# Patient Record
Sex: MALE | Race: WHITE | NOT HISPANIC OR LATINO | Employment: OTHER | URBAN - METROPOLITAN AREA
[De-identification: names, ages, dates, MRNs, and addresses within clinical notes are randomized per-mention and may not be internally consistent; named-entity substitution may affect disease eponyms.]

---

## 2020-11-04 ENCOUNTER — TELEPHONE (OUTPATIENT)
Dept: CARDIOLOGY | Facility: MEDICAL CENTER | Age: 80
End: 2020-11-04

## 2020-11-04 NOTE — TELEPHONE ENCOUNTER
Called patient to see if he has followed with a cardiologist in the past to get records prior to new patient appt with BE. He states he previously saw a cardiologist in [place] and plans to bring his entire file of medical records to his appointment to be scanned into his chart.     Confirmed appointment date and time.

## 2020-11-05 ENCOUNTER — OFFICE VISIT (OUTPATIENT)
Dept: CARDIOLOGY | Facility: MEDICAL CENTER | Age: 80
End: 2020-11-05
Payer: MEDICARE

## 2020-11-05 VITALS
OXYGEN SATURATION: 95 % | RESPIRATION RATE: 16 BRPM | SYSTOLIC BLOOD PRESSURE: 138 MMHG | DIASTOLIC BLOOD PRESSURE: 88 MMHG | HEIGHT: 76 IN | BODY MASS INDEX: 26.91 KG/M2 | WEIGHT: 221 LBS | HEART RATE: 71 BPM

## 2020-11-05 DIAGNOSIS — I77.89 ENLARGED THORACIC AORTA (HCC): ICD-10-CM

## 2020-11-05 DIAGNOSIS — I87.2 VENOUS INSUFFICIENCY OF BOTH LOWER EXTREMITIES: ICD-10-CM

## 2020-11-05 DIAGNOSIS — I10 ESSENTIAL HYPERTENSION, BENIGN: ICD-10-CM

## 2020-11-05 DIAGNOSIS — Z00.00 HEALTHCARE MAINTENANCE: ICD-10-CM

## 2020-11-05 DIAGNOSIS — I48.91 ATRIAL FIBRILLATION, UNSPECIFIED TYPE (HCC): ICD-10-CM

## 2020-11-05 LAB — EKG IMPRESSION: NORMAL

## 2020-11-05 PROCEDURE — 99204 OFFICE O/P NEW MOD 45 MIN: CPT | Performed by: INTERNAL MEDICINE

## 2020-11-05 PROCEDURE — 93000 ELECTROCARDIOGRAM COMPLETE: CPT | Performed by: INTERNAL MEDICINE

## 2020-11-05 RX ORDER — FUROSEMIDE 40 MG/1
40 TABLET ORAL DAILY
COMMUNITY
End: 2021-07-12

## 2020-11-05 RX ORDER — TRAMADOL HYDROCHLORIDE 50 MG/1
50 TABLET ORAL 2 TIMES DAILY
COMMUNITY

## 2020-11-05 RX ORDER — DIGOXIN 250 MCG
250 TABLET ORAL DAILY
COMMUNITY
End: 2021-01-01 | Stop reason: SDUPTHER

## 2020-11-05 RX ORDER — DILTIAZEM HYDROCHLORIDE 120 MG/1
120 TABLET, FILM COATED ORAL DAILY
COMMUNITY

## 2020-11-05 RX ORDER — APIXABAN 5 MG/1
5 TABLET, FILM COATED ORAL 2 TIMES DAILY
COMMUNITY
Start: 2020-10-21

## 2020-11-05 NOTE — PROGRESS NOTES
CARDIOLOGY NEW PATIENT CONSULTATION    PCP: No primary care provider on file.    1. Atrial fibrillation, longstanding persistent (HCC)  Rate is well controlled.  ECG with digoxin changes.  -Continue Eliquis  -Check digoxin level, basic metabolic panel    2. Venous insufficiency of both lower extremities  Aggravated by escalation of calcium channel blocker.  Prior DVT/PE reviewed leg compression.  Minor skin breakdown over the left shin, healing.  Expectant management    3. Enlarged thoracic aorta (HCC)  4.2 cm in 2016.  -Echocardiogram to be updated    4.  Obstructive sleep apnea  Intolerant of PAP therapy.  Expectant management    5.  History of VTE  On Eliquis  -Echocardiogram    Follow up with Sebastian Denise M.D. in 1 year    Chief Complaint   Patient presents with   • Atrial Fibrillation       History: Alfred Diaz is a 80 y.o. male presenting to establish cardiovascular care for a prior diagnosis of atrial fibrillation.  He is also been noted to have enlarged thoracic aorta as well as longstanding venous insufficiency, obstructive sleep apnea and PE/DVT.  He has been on a longstanding stable regimen of Cardizem and digoxin.  At one point in the past he tried to take a higher dose of Cardizem but this was not tolerated for unclear reasons, he believes leg swelling.  He has experienced longstanding lower extremity edema, right greater than left which always resolves by morning.  This is been present since the blood clot.  He takes Eliquis without side effects.  He experiences longstanding back pain and pain around the bilateral knee replacements.  He has limited to standing for 5 to 10 minutes at a time before needing to sit because of back discomfort.  He does not experience angina, palpitations or shortness of breath.        ROS:   All other systems reviewed and negative except as per the HPI    PE:  /88 (BP Location: Left arm, Patient Position: Sitting, BP Cuff Size: Adult)   Pulse 71   Resp 16    "Ht 1.93 m (6' 4\")   Wt 100.2 kg (221 lb)   SpO2 95%   BMI 26.90 kg/m²   Gen: Well appearing  HEENT: Symmetric face. Anicteric sclerae. Moist mucus membranes  NECK: No JVD. No lymphadenopathy  CARDIAC: Normal PMI, irregular, normal S1, S2. without murmur  VASCULATURE: Normal carotid amplitude without bruit.   RESP: Clear to auscultation bilaterally  ABD: Soft, non-tender, non-distended  EXT: 2+ lower extremity edema on the right, 1+ on the left.  No deformity or cyanosis.  Eschar present over the left shin  SKIN: Warm and dry  NEURO: No gross deficits  PSYCH: Appropriate affect, participates in conversation    History reviewed. No pertinent past medical history.  History reviewed. No pertinent surgical history.  Allergies   Allergen Reactions   • Vicodin [Apap-Fd&C Yellow #10 [place]-Hydrocodone] Rash     Rash     Outpatient Encounter Medications as of 11/5/2020   Medication Sig Dispense Refill   • ELIQUIS 5 MG Tab Take 5 mg by mouth 2 Times a Day.     • traMADol (ULTRAM) 50 MG Tab Take 50 mg by mouth every four hours as needed.     • DILTIAZem (CARDIZEM) 120 MG Tab Take 120 mg by mouth every day.     • digoxin (LANOXIN) 250 MCG Tab Take 250 mcg by mouth every day.     • furosemide (LASIX) 40 MG Tab Take 40 mg by mouth every day.     • Acetaminophen (TYLENOL 8 HOUR PO) Take  by mouth.       No facility-administered encounter medications on file as of 11/5/2020.      Social History     Socioeconomic History   • Marital status: Not on file     Spouse name: Not on file   • Number of children: Not on file   • Years of education: Not on file   • Highest education level: Not on file   Occupational History   • Not on file   Social Needs   • Financial resource strain: Not on file   • Food insecurity     Worry: Not on file     Inability: Not on file   • Transportation needs     Medical: Not on file     Non-medical: Not on file   Tobacco Use   • Smoking status: Former Smoker     Quit date: 11/5/1955     Years since " quittin.0   • Smokeless tobacco: Never Used   Substance and Sexual Activity   • Alcohol use: Not on file   • Drug use: Not on file   • Sexual activity: Not on file   Lifestyle   • Physical activity     Days per week: Not on file     Minutes per session: Not on file   • Stress: Not on file   Relationships   • Social connections     Talks on phone: Not on file     Gets together: Not on file     Attends Anglican service: Not on file     Active member of club or organization: Not on file     Attends meetings of clubs or organizations: Not on file     Relationship status: Not on file   • Intimate partner violence     Fear of current or ex partner: Not on file     Emotionally abused: Not on file     Physically abused: Not on file     Forced sexual activity: Not on file   Other Topics Concern   • Not on file   Social History Narrative   • Not on file       History reviewed. No pertinent family history.      Studies  No results found for: CHOLSTRLTOT, LDL, HDL, TRIGLYCERIDE    No results found for: SODIUM, POTASSIUM, CHLORIDE, CO2, GLUCOSE, BUN, CREATININE, BUNCREATRAT, GLOMRATE  No results found for: ALKPHOSPHAT, ASTSGOT, ALTSGPT, TBILIRUBIN     For this encounter I directly reviewed Echo images and medical records I agree with the interpretations in the electronic health record

## 2020-11-20 ENCOUNTER — HOSPITAL ENCOUNTER (OUTPATIENT)
Dept: LAB | Facility: MEDICAL CENTER | Age: 80
End: 2020-11-20
Attending: INTERNAL MEDICINE
Payer: MEDICARE

## 2020-11-20 DIAGNOSIS — I77.89 ENLARGED THORACIC AORTA (HCC): ICD-10-CM

## 2020-11-20 DIAGNOSIS — I10 ESSENTIAL HYPERTENSION, BENIGN: ICD-10-CM

## 2020-11-20 DIAGNOSIS — I87.2 VENOUS INSUFFICIENCY OF BOTH LOWER EXTREMITIES: ICD-10-CM

## 2020-11-20 DIAGNOSIS — I48.91 ATRIAL FIBRILLATION, UNSPECIFIED TYPE (HCC): ICD-10-CM

## 2020-11-20 DIAGNOSIS — Z00.00 HEALTHCARE MAINTENANCE: ICD-10-CM

## 2020-11-20 LAB
ANION GAP SERPL CALC-SCNC: 7 MMOL/L (ref 7–16)
BUN SERPL-MCNC: 21 MG/DL (ref 8–22)
CALCIUM SERPL-MCNC: 9.1 MG/DL (ref 8.5–10.5)
CHLORIDE SERPL-SCNC: 105 MMOL/L (ref 96–112)
CHOLEST SERPL-MCNC: 203 MG/DL (ref 100–199)
CO2 SERPL-SCNC: 30 MMOL/L (ref 20–33)
CREAT SERPL-MCNC: 0.92 MG/DL (ref 0.5–1.4)
DIGOXIN SERPL-MCNC: 0.9 NG/ML (ref 0.8–2)
ERYTHROCYTE [DISTWIDTH] IN BLOOD BY AUTOMATED COUNT: 52.8 FL (ref 35.9–50)
FASTING STATUS PATIENT QL REPORTED: NORMAL
GLUCOSE SERPL-MCNC: 78 MG/DL (ref 65–99)
HCT VFR BLD AUTO: 53.5 % (ref 42–52)
HDLC SERPL-MCNC: 58 MG/DL
HGB BLD-MCNC: 17.5 G/DL (ref 14–18)
LDLC SERPL CALC-MCNC: 133 MG/DL
MCH RBC QN AUTO: 33.2 PG (ref 27–33)
MCHC RBC AUTO-ENTMCNC: 32.7 G/DL (ref 33.7–35.3)
MCV RBC AUTO: 101.5 FL (ref 81.4–97.8)
NT-PROBNP SERPL IA-MCNC: 786 PG/ML (ref 0–125)
PLATELET # BLD AUTO: 170 K/UL (ref 164–446)
PMV BLD AUTO: 11.8 FL (ref 9–12.9)
POTASSIUM SERPL-SCNC: 4.3 MMOL/L (ref 3.6–5.5)
RBC # BLD AUTO: 5.27 M/UL (ref 4.7–6.1)
SODIUM SERPL-SCNC: 142 MMOL/L (ref 135–145)
TRIGL SERPL-MCNC: 59 MG/DL (ref 0–149)
WBC # BLD AUTO: 6 K/UL (ref 4.8–10.8)

## 2020-11-20 PROCEDURE — 83880 ASSAY OF NATRIURETIC PEPTIDE: CPT | Mod: GA

## 2020-11-20 PROCEDURE — 36415 COLL VENOUS BLD VENIPUNCTURE: CPT

## 2020-11-20 PROCEDURE — 80061 LIPID PANEL: CPT

## 2020-11-20 PROCEDURE — 85027 COMPLETE CBC AUTOMATED: CPT

## 2020-11-20 PROCEDURE — 80048 BASIC METABOLIC PNL TOTAL CA: CPT

## 2020-11-20 PROCEDURE — 80162 ASSAY OF DIGOXIN TOTAL: CPT

## 2020-11-23 ENCOUNTER — TELEPHONE (OUTPATIENT)
Dept: CARDIOLOGY | Facility: MEDICAL CENTER | Age: 80
End: 2020-11-23

## 2020-11-23 DIAGNOSIS — E78.00 HYPERCHOLESTEREMIA: ICD-10-CM

## 2020-11-23 NOTE — TELEPHONE ENCOUNTER
Sebastian Denise M.D.  Wendi Markham R.N.             Labs overall look reasonable with the exception of the a slight elevation in the cholesterol. I suggest healthy dietary practices to lower cholesterol- high fiber, low animal fat diet and/or cholesterol lowering medication with atorvastatin 10 mg daily. Repeat cholesterol panel in three months.        Called pt and discussed lab results. He saw result note on Mychart. He feels that he is taking too many pills currently and would like to avoid atorvastatin at this time. He will try making dietary changes and repeat lipid panel in 3 months. Lab slip mailed to pt. He is also going to lose his PCP after the 1st of the year and is wondering if we can refill his meds when he needs them. Informed that we could refill cardiac meds and to let us know when he needs refills.

## 2020-12-08 ENCOUNTER — HOSPITAL ENCOUNTER (OUTPATIENT)
Dept: CARDIOLOGY | Facility: MEDICAL CENTER | Age: 80
End: 2020-12-08
Attending: INTERNAL MEDICINE
Payer: MEDICARE

## 2020-12-08 DIAGNOSIS — I87.2 VENOUS INSUFFICIENCY OF BOTH LOWER EXTREMITIES: ICD-10-CM

## 2020-12-08 DIAGNOSIS — I48.91 ATRIAL FIBRILLATION, UNSPECIFIED TYPE (HCC): ICD-10-CM

## 2020-12-08 LAB
LV EJECT FRACT  99904: 55
LV EJECT FRACT MOD 2C 99903: 53.43
LV EJECT FRACT MOD 4C 99902: 73.07
LV EJECT FRACT MOD BP 99901: 66.16

## 2020-12-08 PROCEDURE — 93306 TTE W/DOPPLER COMPLETE: CPT

## 2020-12-08 PROCEDURE — 93306 TTE W/DOPPLER COMPLETE: CPT | Mod: 26 | Performed by: INTERNAL MEDICINE

## 2021-01-01 ENCOUNTER — OFFICE VISIT (OUTPATIENT)
Dept: MEDICAL GROUP | Facility: PHYSICIAN GROUP | Age: 81
End: 2021-01-01
Payer: MEDICARE

## 2021-01-01 ENCOUNTER — PATIENT MESSAGE (OUTPATIENT)
Dept: CARDIOLOGY | Facility: MEDICAL CENTER | Age: 81
End: 2021-01-01

## 2021-01-01 ENCOUNTER — TELEPHONE (OUTPATIENT)
Dept: CARDIOLOGY | Facility: MEDICAL CENTER | Age: 81
End: 2021-01-01

## 2021-01-01 ENCOUNTER — SLEEP CENTER VISIT (OUTPATIENT)
Dept: SLEEP MEDICINE | Facility: MEDICAL CENTER | Age: 81
End: 2021-01-01
Payer: MEDICARE

## 2021-01-01 ENCOUNTER — TELEPHONE (OUTPATIENT)
Dept: SCHEDULING | Facility: IMAGING CENTER | Age: 81
End: 2021-01-01

## 2021-01-01 ENCOUNTER — HOSPITAL ENCOUNTER (OUTPATIENT)
Dept: LAB | Facility: MEDICAL CENTER | Age: 81
End: 2021-08-20
Attending: FAMILY MEDICINE
Payer: MEDICARE

## 2021-01-01 ENCOUNTER — HOSPITAL ENCOUNTER (OUTPATIENT)
Dept: LAB | Facility: MEDICAL CENTER | Age: 81
End: 2021-08-04
Attending: NURSE PRACTITIONER
Payer: MEDICARE

## 2021-01-01 ENCOUNTER — OFFICE VISIT (OUTPATIENT)
Dept: CARDIOLOGY | Facility: MEDICAL CENTER | Age: 81
End: 2021-01-01
Payer: MEDICARE

## 2021-01-01 ENCOUNTER — HOSPITAL ENCOUNTER (OUTPATIENT)
Dept: RADIOLOGY | Facility: MEDICAL CENTER | Age: 81
End: 2021-11-01
Attending: FAMILY MEDICINE
Payer: MEDICARE

## 2021-01-01 ENCOUNTER — TELEPHONE (OUTPATIENT)
Dept: SLEEP MEDICINE | Facility: MEDICAL CENTER | Age: 81
End: 2021-01-01

## 2021-01-01 ENCOUNTER — NON-PROVIDER VISIT (OUTPATIENT)
Dept: CARDIOLOGY | Facility: MEDICAL CENTER | Age: 81
End: 2021-01-01
Payer: MEDICARE

## 2021-01-01 ENCOUNTER — OFFICE VISIT (OUTPATIENT)
Dept: SLEEP MEDICINE | Facility: MEDICAL CENTER | Age: 81
End: 2021-01-01
Payer: MEDICARE

## 2021-01-01 ENCOUNTER — SLEEP STUDY (OUTPATIENT)
Dept: SLEEP MEDICINE | Facility: MEDICAL CENTER | Age: 81
End: 2021-01-01
Attending: PREVENTIVE MEDICINE
Payer: MEDICARE

## 2021-01-01 ENCOUNTER — HOSPITAL ENCOUNTER (OUTPATIENT)
Dept: RADIOLOGY | Facility: MEDICAL CENTER | Age: 81
End: 2021-08-27
Attending: PREVENTIVE MEDICINE
Payer: MEDICARE

## 2021-01-01 ENCOUNTER — SLEEP STUDY (OUTPATIENT)
Dept: SLEEP MEDICINE | Facility: MEDICAL CENTER | Age: 81
End: 2021-01-01
Attending: NURSE PRACTITIONER
Payer: MEDICARE

## 2021-01-01 ENCOUNTER — HOSPITAL ENCOUNTER (OUTPATIENT)
Dept: LAB | Facility: MEDICAL CENTER | Age: 81
End: 2021-08-20
Attending: NURSE PRACTITIONER
Payer: MEDICARE

## 2021-01-01 ENCOUNTER — HOSPITAL ENCOUNTER (OUTPATIENT)
Dept: LAB | Facility: MEDICAL CENTER | Age: 81
End: 2021-08-20
Attending: INTERNAL MEDICINE
Payer: MEDICARE

## 2021-01-01 VITALS
RESPIRATION RATE: 18 BRPM | OXYGEN SATURATION: 98 % | DIASTOLIC BLOOD PRESSURE: 78 MMHG | HEIGHT: 76 IN | HEART RATE: 66 BPM | SYSTOLIC BLOOD PRESSURE: 112 MMHG | WEIGHT: 219.8 LBS | BODY MASS INDEX: 26.77 KG/M2 | TEMPERATURE: 98.6 F

## 2021-01-01 VITALS
HEART RATE: 80 BPM | SYSTOLIC BLOOD PRESSURE: 126 MMHG | HEIGHT: 76 IN | DIASTOLIC BLOOD PRESSURE: 80 MMHG | TEMPERATURE: 97.6 F | WEIGHT: 227.4 LBS | RESPIRATION RATE: 16 BRPM | BODY MASS INDEX: 27.69 KG/M2 | OXYGEN SATURATION: 94 %

## 2021-01-01 VITALS
SYSTOLIC BLOOD PRESSURE: 140 MMHG | BODY MASS INDEX: 26.67 KG/M2 | DIASTOLIC BLOOD PRESSURE: 100 MMHG | HEART RATE: 85 BPM | RESPIRATION RATE: 16 BRPM | WEIGHT: 219 LBS | OXYGEN SATURATION: 94 % | HEIGHT: 76 IN

## 2021-01-01 VITALS
HEIGHT: 76 IN | WEIGHT: 218 LBS | BODY MASS INDEX: 26.55 KG/M2 | OXYGEN SATURATION: 95 % | HEART RATE: 77 BPM | SYSTOLIC BLOOD PRESSURE: 114 MMHG | RESPIRATION RATE: 16 BRPM | DIASTOLIC BLOOD PRESSURE: 70 MMHG

## 2021-01-01 VITALS
DIASTOLIC BLOOD PRESSURE: 76 MMHG | HEART RATE: 80 BPM | RESPIRATION RATE: 16 BRPM | OXYGEN SATURATION: 94 % | BODY MASS INDEX: 27.34 KG/M2 | HEIGHT: 76 IN | WEIGHT: 224.5 LBS | SYSTOLIC BLOOD PRESSURE: 120 MMHG

## 2021-01-01 VITALS — SYSTOLIC BLOOD PRESSURE: 118 MMHG | HEART RATE: 72 BPM | DIASTOLIC BLOOD PRESSURE: 64 MMHG

## 2021-01-01 VITALS
DIASTOLIC BLOOD PRESSURE: 82 MMHG | SYSTOLIC BLOOD PRESSURE: 130 MMHG | OXYGEN SATURATION: 96 % | BODY MASS INDEX: 27.64 KG/M2 | WEIGHT: 227 LBS | RESPIRATION RATE: 14 BRPM | HEART RATE: 72 BPM | HEIGHT: 76 IN

## 2021-01-01 VITALS
RESPIRATION RATE: 16 BRPM | DIASTOLIC BLOOD PRESSURE: 74 MMHG | TEMPERATURE: 97.4 F | WEIGHT: 223.4 LBS | OXYGEN SATURATION: 95 % | BODY MASS INDEX: 27.2 KG/M2 | HEART RATE: 64 BPM | SYSTOLIC BLOOD PRESSURE: 112 MMHG | HEIGHT: 76 IN

## 2021-01-01 VITALS
HEIGHT: 76 IN | RESPIRATION RATE: 16 BRPM | BODY MASS INDEX: 27.38 KG/M2 | DIASTOLIC BLOOD PRESSURE: 78 MMHG | WEIGHT: 224.8 LBS | OXYGEN SATURATION: 96 % | SYSTOLIC BLOOD PRESSURE: 126 MMHG | TEMPERATURE: 97.6 F | HEART RATE: 78 BPM

## 2021-01-01 VITALS
SYSTOLIC BLOOD PRESSURE: 114 MMHG | DIASTOLIC BLOOD PRESSURE: 72 MMHG | RESPIRATION RATE: 12 BRPM | OXYGEN SATURATION: 92 % | HEART RATE: 87 BPM | WEIGHT: 223.2 LBS | BODY MASS INDEX: 27.18 KG/M2 | HEIGHT: 76 IN

## 2021-01-01 DIAGNOSIS — I27.21 PAH (PULMONARY ARTERY HYPERTENSION) (HCC): ICD-10-CM

## 2021-01-01 DIAGNOSIS — Z86.711 HX OF PULMONARY EMBOLUS: ICD-10-CM

## 2021-01-01 DIAGNOSIS — Z23 NEED FOR IMMUNIZATION AGAINST INFLUENZA: ICD-10-CM

## 2021-01-01 DIAGNOSIS — R71.8 ELEVATED MCV: ICD-10-CM

## 2021-01-01 DIAGNOSIS — G47.33 OSA (OBSTRUCTIVE SLEEP APNEA): ICD-10-CM

## 2021-01-01 DIAGNOSIS — K40.20 BILATERAL INGUINAL HERNIA WITHOUT OBSTRUCTION OR GANGRENE, RECURRENCE NOT SPECIFIED: ICD-10-CM

## 2021-01-01 DIAGNOSIS — I26.99 PULMONARY EMBOLISM, UNSPECIFIED CHRONICITY, UNSPECIFIED PULMONARY EMBOLISM TYPE, UNSPECIFIED WHETHER ACUTE COR PULMONALE PRESENT (HCC): ICD-10-CM

## 2021-01-01 DIAGNOSIS — I48.11 LONGSTANDING PERSISTENT ATRIAL FIBRILLATION (HCC): ICD-10-CM

## 2021-01-01 DIAGNOSIS — I27.20 PULMONARY HTN (HCC): ICD-10-CM

## 2021-01-01 DIAGNOSIS — Z79.01 CHRONIC ANTICOAGULATION: ICD-10-CM

## 2021-01-01 DIAGNOSIS — I26.09 OTHER PULMONARY EMBOLISM WITH ACUTE COR PULMONALE, UNSPECIFIED CHRONICITY (HCC): ICD-10-CM

## 2021-01-01 DIAGNOSIS — R19.00 MASS OF ABDOMEN OF SINGLE QUADRANT: ICD-10-CM

## 2021-01-01 DIAGNOSIS — G47.39 TREATMENT-EMERGENT CENTRAL SLEEP APNEA: ICD-10-CM

## 2021-01-01 DIAGNOSIS — I82.4Z1 DEEP VEIN THROMBOSIS (DVT) OF DISTAL VEIN OF RIGHT LOWER EXTREMITY, UNSPECIFIED CHRONICITY (HCC): ICD-10-CM

## 2021-01-01 DIAGNOSIS — G47.30 BREATHING-RELATED SLEEP DISORDER: ICD-10-CM

## 2021-01-01 DIAGNOSIS — G47.31 COMPLEX SLEEP APNEA SYNDROME: ICD-10-CM

## 2021-01-01 DIAGNOSIS — Z79.01 ANTICOAGULATED: ICD-10-CM

## 2021-01-01 DIAGNOSIS — I51.89 DIASTOLIC DYSFUNCTION: ICD-10-CM

## 2021-01-01 DIAGNOSIS — I48.91 ATRIAL FIBRILLATION, UNSPECIFIED TYPE (HCC): ICD-10-CM

## 2021-01-01 DIAGNOSIS — E78.00 HYPERCHOLESTEREMIA: ICD-10-CM

## 2021-01-01 DIAGNOSIS — I87.2 VENOUS INSUFFICIENCY OF BOTH LOWER EXTREMITIES: ICD-10-CM

## 2021-01-01 DIAGNOSIS — R53.83 OTHER FATIGUE: ICD-10-CM

## 2021-01-01 DIAGNOSIS — R06.02 SHORTNESS OF BREATH: ICD-10-CM

## 2021-01-01 DIAGNOSIS — K40.21 BILATERAL RECURRENT INGUINAL HERNIA WITHOUT OBSTRUCTION OR GANGRENE: ICD-10-CM

## 2021-01-01 LAB
ANION GAP SERPL CALC-SCNC: 11 MMOL/L (ref 7–16)
ANION GAP SERPL CALC-SCNC: 12 MMOL/L (ref 7–16)
BASOPHILS # BLD AUTO: 0.8 % (ref 0–1.8)
BASOPHILS # BLD: 0.07 K/UL (ref 0–0.12)
BUN SERPL-MCNC: 19 MG/DL (ref 8–22)
BUN SERPL-MCNC: 23 MG/DL (ref 8–22)
CALCIUM SERPL-MCNC: 9.1 MG/DL (ref 8.5–10.5)
CALCIUM SERPL-MCNC: 9.4 MG/DL (ref 8.5–10.5)
CHLORIDE SERPL-SCNC: 103 MMOL/L (ref 96–112)
CHLORIDE SERPL-SCNC: 104 MMOL/L (ref 96–112)
CHOLEST SERPL-MCNC: 206 MG/DL (ref 100–199)
CO2 SERPL-SCNC: 26 MMOL/L (ref 20–33)
CO2 SERPL-SCNC: 27 MMOL/L (ref 20–33)
CREAT SERPL-MCNC: 1.06 MG/DL (ref 0.5–1.4)
CREAT SERPL-MCNC: 1.11 MG/DL (ref 0.5–1.4)
EOSINOPHIL # BLD AUTO: 0.12 K/UL (ref 0–0.51)
EOSINOPHIL NFR BLD: 1.4 % (ref 0–6.9)
ERYTHROCYTE [DISTWIDTH] IN BLOOD BY AUTOMATED COUNT: 50.5 FL (ref 35.9–50)
FASTING STATUS PATIENT QL REPORTED: NORMAL
FASTING STATUS PATIENT QL REPORTED: NORMAL
FOLATE SERPL-MCNC: 8.1 NG/ML
GLUCOSE SERPL-MCNC: 89 MG/DL (ref 65–99)
GLUCOSE SERPL-MCNC: 98 MG/DL (ref 65–99)
HCT VFR BLD AUTO: 53.3 % (ref 42–52)
HDLC SERPL-MCNC: 47 MG/DL
HGB BLD-MCNC: 17.4 G/DL (ref 14–18)
IMM GRANULOCYTES # BLD AUTO: 0.02 K/UL (ref 0–0.11)
IMM GRANULOCYTES NFR BLD AUTO: 0.2 % (ref 0–0.9)
LDLC SERPL CALC-MCNC: 148 MG/DL
LYMPHOCYTES # BLD AUTO: 2.4 K/UL (ref 1–4.8)
LYMPHOCYTES NFR BLD: 29 % (ref 22–41)
MCH RBC QN AUTO: 32.5 PG (ref 27–33)
MCHC RBC AUTO-ENTMCNC: 32.6 G/DL (ref 33.7–35.3)
MCV RBC AUTO: 99.6 FL (ref 81.4–97.8)
MONOCYTES # BLD AUTO: 0.61 K/UL (ref 0–0.85)
MONOCYTES NFR BLD AUTO: 7.4 % (ref 0–13.4)
NEUTROPHILS # BLD AUTO: 5.07 K/UL (ref 1.82–7.42)
NEUTROPHILS NFR BLD: 61.2 % (ref 44–72)
NRBC # BLD AUTO: 0 K/UL
NRBC BLD-RTO: 0 /100 WBC
NT-PROBNP SERPL IA-MCNC: 1249 PG/ML (ref 0–125)
NT-PROBNP SERPL IA-MCNC: 831 PG/ML (ref 0–125)
PLATELET # BLD AUTO: 197 K/UL (ref 164–446)
PMV BLD AUTO: 11.4 FL (ref 9–12.9)
POTASSIUM SERPL-SCNC: 4.1 MMOL/L (ref 3.6–5.5)
POTASSIUM SERPL-SCNC: 4.5 MMOL/L (ref 3.6–5.5)
RBC # BLD AUTO: 5.35 M/UL (ref 4.7–6.1)
SODIUM SERPL-SCNC: 141 MMOL/L (ref 135–145)
SODIUM SERPL-SCNC: 142 MMOL/L (ref 135–145)
TRIGL SERPL-MCNC: 55 MG/DL (ref 0–149)
VIT B12 SERPL-MCNC: 504 PG/ML (ref 211–911)
WBC # BLD AUTO: 8.3 K/UL (ref 4.8–10.8)

## 2021-01-01 PROCEDURE — 85025 COMPLETE CBC W/AUTO DIFF WBC: CPT

## 2021-01-01 PROCEDURE — 95811 POLYSOM 6/>YRS CPAP 4/> PARM: CPT | Performed by: FAMILY MEDICINE

## 2021-01-01 PROCEDURE — 83880 ASSAY OF NATRIURETIC PEPTIDE: CPT | Mod: GA

## 2021-01-01 PROCEDURE — 99213 OFFICE O/P EST LOW 20 MIN: CPT | Performed by: FAMILY MEDICINE

## 2021-01-01 PROCEDURE — 99214 OFFICE O/P EST MOD 30 MIN: CPT | Performed by: NURSE PRACTITIONER

## 2021-01-01 PROCEDURE — 80048 BASIC METABOLIC PNL TOTAL CA: CPT

## 2021-01-01 PROCEDURE — 99999 PR NO CHARGE: CPT | Performed by: NURSE PRACTITIONER

## 2021-01-01 PROCEDURE — G0008 ADMIN INFLUENZA VIRUS VAC: HCPCS | Performed by: NURSE PRACTITIONER

## 2021-01-01 PROCEDURE — 36415 COLL VENOUS BLD VENIPUNCTURE: CPT

## 2021-01-01 PROCEDURE — 99204 OFFICE O/P NEW MOD 45 MIN: CPT | Performed by: FAMILY MEDICINE

## 2021-01-01 PROCEDURE — 71046 X-RAY EXAM CHEST 2 VIEWS: CPT

## 2021-01-01 PROCEDURE — 99213 OFFICE O/P EST LOW 20 MIN: CPT | Mod: 25 | Performed by: NURSE PRACTITIONER

## 2021-01-01 PROCEDURE — 82746 ASSAY OF FOLIC ACID SERUM: CPT

## 2021-01-01 PROCEDURE — 99214 OFFICE O/P EST MOD 30 MIN: CPT | Performed by: PREVENTIVE MEDICINE

## 2021-01-01 PROCEDURE — 95811 POLYSOM 6/>YRS CPAP 4/> PARM: CPT | Performed by: INTERNAL MEDICINE

## 2021-01-01 PROCEDURE — 74176 CT ABD & PELVIS W/O CONTRAST: CPT | Mod: MG

## 2021-01-01 PROCEDURE — 83880 ASSAY OF NATRIURETIC PEPTIDE: CPT

## 2021-01-01 PROCEDURE — 90662 IIV NO PRSV INCREASED AG IM: CPT | Performed by: NURSE PRACTITIONER

## 2021-01-01 PROCEDURE — 80061 LIPID PANEL: CPT

## 2021-01-01 PROCEDURE — 82607 VITAMIN B-12: CPT

## 2021-01-01 PROCEDURE — 99214 OFFICE O/P EST MOD 30 MIN: CPT | Performed by: FAMILY MEDICINE

## 2021-01-01 PROCEDURE — 99204 OFFICE O/P NEW MOD 45 MIN: CPT | Performed by: PREVENTIVE MEDICINE

## 2021-01-01 RX ORDER — POTASSIUM CHLORIDE 1500 MG/1
TABLET, EXTENDED RELEASE ORAL
Qty: 180 TABLET | Refills: 3 | Status: SHIPPED | OUTPATIENT
Start: 2021-01-01 | End: 2022-01-01

## 2021-01-01 RX ORDER — FUROSEMIDE 40 MG/1
40 TABLET ORAL 2 TIMES DAILY
Qty: 60 TABLET | Refills: 2 | Status: SHIPPED | OUTPATIENT
Start: 2021-01-01 | End: 2021-01-01

## 2021-01-01 RX ORDER — POTASSIUM CHLORIDE 1500 MG/1
TABLET, EXTENDED RELEASE ORAL
COMMUNITY
Start: 2021-01-01 | End: 2021-01-01

## 2021-01-01 RX ORDER — DIGOXIN 250 MCG
250 TABLET ORAL DAILY
Qty: 90 TABLET | Refills: 3 | Status: SHIPPED | OUTPATIENT
Start: 2021-01-01

## 2021-01-01 RX ORDER — POTASSIUM CHLORIDE 20 MEQ/1
20 TABLET, EXTENDED RELEASE ORAL 2 TIMES DAILY
Qty: 60 TABLET | Refills: 2 | Status: SHIPPED | OUTPATIENT
Start: 2021-01-01 | End: 2021-01-01

## 2021-01-01 RX ORDER — TRAMADOL HYDROCHLORIDE 50 MG/1
TABLET ORAL
COMMUNITY
Start: 2021-01-01 | End: 2021-01-01

## 2021-01-01 ASSESSMENT — FIBROSIS 4 INDEX
FIB4 SCORE: 2.04
FIB4 SCORE: 2.37
FIB4 SCORE: 2.04
FIB4 SCORE: 2.37
FIB4 SCORE: 2.04
FIB4 SCORE: 2.04

## 2021-01-01 ASSESSMENT — ENCOUNTER SYMPTOMS
BACK PAIN: 1
CLAUDICATION: 0
COUGH: 0
DIZZINESS: 0
ORTHOPNEA: 0
PALPITATIONS: 0
ORTHOPNEA: 0
ABDOMINAL PAIN: 0
CLAUDICATION: 0
PND: 0
FEVER: 0
BACK PAIN: 1
MYALGIAS: 0
COUGH: 0
MYALGIAS: 0
ABDOMINAL PAIN: 0
DIZZINESS: 0
PND: 0
SHORTNESS OF BREATH: 1
PALPITATIONS: 0
FEVER: 0
SHORTNESS OF BREATH: 1

## 2021-01-01 ASSESSMENT — PATIENT HEALTH QUESTIONNAIRE - PHQ9: CLINICAL INTERPRETATION OF PHQ2 SCORE: 0

## 2021-01-12 DIAGNOSIS — Z23 NEED FOR VACCINATION: ICD-10-CM

## 2021-07-07 ENCOUNTER — OFFICE VISIT (OUTPATIENT)
Dept: CARDIOLOGY | Facility: MEDICAL CENTER | Age: 81
End: 2021-07-07
Payer: MEDICARE

## 2021-07-07 VITALS
HEIGHT: 76 IN | DIASTOLIC BLOOD PRESSURE: 82 MMHG | WEIGHT: 231 LBS | BODY MASS INDEX: 28.13 KG/M2 | RESPIRATION RATE: 14 BRPM | OXYGEN SATURATION: 95 % | SYSTOLIC BLOOD PRESSURE: 140 MMHG | HEART RATE: 88 BPM

## 2021-07-07 DIAGNOSIS — I26.09 OTHER PULMONARY EMBOLISM WITH ACUTE COR PULMONALE, UNSPECIFIED CHRONICITY (HCC): ICD-10-CM

## 2021-07-07 DIAGNOSIS — I51.89 DIASTOLIC DYSFUNCTION: ICD-10-CM

## 2021-07-07 DIAGNOSIS — I87.2 VENOUS INSUFFICIENCY OF BOTH LOWER EXTREMITIES: ICD-10-CM

## 2021-07-07 DIAGNOSIS — I48.91 ATRIAL FIBRILLATION, UNSPECIFIED TYPE (HCC): ICD-10-CM

## 2021-07-07 DIAGNOSIS — I82.4Z1 DEEP VEIN THROMBOSIS (DVT) OF DISTAL VEIN OF RIGHT LOWER EXTREMITY, UNSPECIFIED CHRONICITY (HCC): ICD-10-CM

## 2021-07-07 DIAGNOSIS — Z79.01 CHRONIC ANTICOAGULATION: ICD-10-CM

## 2021-07-07 DIAGNOSIS — G47.33 OSA (OBSTRUCTIVE SLEEP APNEA): ICD-10-CM

## 2021-07-07 DIAGNOSIS — I27.21 PAH (PULMONARY ARTERY HYPERTENSION) (HCC): ICD-10-CM

## 2021-07-07 PROBLEM — I82.4Z9 DEEP VEIN THROMBOSIS (DVT) OF DISTAL VEIN OF LOWER EXTREMITY (HCC): Status: ACTIVE | Noted: 2021-07-07

## 2021-07-07 PROBLEM — I26.99 PULMONARY EMBOLUS (HCC): Status: ACTIVE | Noted: 2021-07-07

## 2021-07-07 LAB — EKG IMPRESSION: NORMAL

## 2021-07-07 PROCEDURE — 93000 ELECTROCARDIOGRAM COMPLETE: CPT | Performed by: INTERNAL MEDICINE

## 2021-07-07 PROCEDURE — 99214 OFFICE O/P EST MOD 30 MIN: CPT | Performed by: NURSE PRACTITIONER

## 2021-07-07 ASSESSMENT — ENCOUNTER SYMPTOMS
FEVER: 0
COUGH: 0
ORTHOPNEA: 0
PALPITATIONS: 0
SHORTNESS OF BREATH: 1
CLAUDICATION: 0
ABDOMINAL PAIN: 0
PND: 0
BACK PAIN: 1
DIZZINESS: 0
MYALGIAS: 0

## 2021-07-07 NOTE — PATIENT INSTRUCTIONS
I have referred you to Sleep medicine for consultation for sleep apnea treatment.    I have placed lab orders to be done this week or next to see if you are retaining fluid.    We will increase your furosemide back up to 40 mg in the morning.    We will see you back in 1 month for re-evaluation of your fatigue and shortness of breath.    Please schedule a follow up with a primary doctor. Call [phone] to schedule this appointment.

## 2021-07-09 ENCOUNTER — HOSPITAL ENCOUNTER (OUTPATIENT)
Dept: LAB | Facility: MEDICAL CENTER | Age: 81
End: 2021-07-09
Attending: NURSE PRACTITIONER
Payer: MEDICARE

## 2021-07-09 DIAGNOSIS — I51.89 DIASTOLIC DYSFUNCTION: ICD-10-CM

## 2021-07-09 DIAGNOSIS — I87.2 VENOUS INSUFFICIENCY OF BOTH LOWER EXTREMITIES: ICD-10-CM

## 2021-07-09 LAB
ALBUMIN SERPL BCP-MCNC: 3.6 G/DL (ref 3.2–4.9)
ALBUMIN/GLOB SERPL: 1.3 G/DL
ALP SERPL-CCNC: 99 U/L (ref 30–99)
ALT SERPL-CCNC: 15 U/L (ref 2–50)
ANION GAP SERPL CALC-SCNC: 12 MMOL/L (ref 7–16)
AST SERPL-CCNC: 19 U/L (ref 12–45)
BILIRUB SERPL-MCNC: 1.2 MG/DL (ref 0.1–1.5)
BUN SERPL-MCNC: 21 MG/DL (ref 8–22)
CALCIUM SERPL-MCNC: 9 MG/DL (ref 8.5–10.5)
CHLORIDE SERPL-SCNC: 105 MMOL/L (ref 96–112)
CO2 SERPL-SCNC: 26 MMOL/L (ref 20–33)
CREAT SERPL-MCNC: 0.96 MG/DL (ref 0.5–1.4)
GLOBULIN SER CALC-MCNC: 2.7 G/DL (ref 1.9–3.5)
GLUCOSE SERPL-MCNC: 94 MG/DL (ref 65–99)
NT-PROBNP SERPL IA-MCNC: 781 PG/ML (ref 0–125)
POTASSIUM SERPL-SCNC: 4.4 MMOL/L (ref 3.6–5.5)
PROT SERPL-MCNC: 6.3 G/DL (ref 6–8.2)
SODIUM SERPL-SCNC: 143 MMOL/L (ref 135–145)

## 2021-07-09 PROCEDURE — 36415 COLL VENOUS BLD VENIPUNCTURE: CPT

## 2021-07-09 PROCEDURE — 80053 COMPREHEN METABOLIC PANEL: CPT

## 2021-07-09 PROCEDURE — 83880 ASSAY OF NATRIURETIC PEPTIDE: CPT | Mod: GA

## 2021-07-12 ENCOUNTER — TELEPHONE (OUTPATIENT)
Dept: CARDIOLOGY | Facility: MEDICAL CENTER | Age: 81
End: 2021-07-12

## 2021-07-12 DIAGNOSIS — I51.89 DIASTOLIC DYSFUNCTION: ICD-10-CM

## 2021-07-12 DIAGNOSIS — R06.02 SHORTNESS OF BREATH: ICD-10-CM

## 2021-07-12 RX ORDER — FUROSEMIDE 40 MG/1
TABLET ORAL
Qty: 60 TABLET | Refills: 0 | Status: SHIPPED | OUTPATIENT
Start: 2021-07-12 | End: 2021-01-01

## 2021-07-12 RX ORDER — POTASSIUM CHLORIDE 20 MEQ/1
20 TABLET, EXTENDED RELEASE ORAL DAILY
Qty: 14 TABLET | Refills: 0 | Status: SHIPPED | OUTPATIENT
Start: 2021-07-12 | End: 2021-01-01

## 2021-07-12 NOTE — TELEPHONE ENCOUNTER
Notified patient of results and recommendations (he had seen Joshhart message from Nan earlier today). He states he is still experiencing SOB and fatigue, but symptoms haven't worsened. He denies weight gain. He will start taking/logging daily VS with daily weights. Discussed worsening signs of HF and to notify us with any concerns.    Updated prescription sent to [facility] pharmacy. Labs ordered and mailed to patient.

## 2021-07-12 NOTE — TELEPHONE ENCOUNTER
----- Message from LOLI Guthrie sent at 7/12/2021 12:44 PM PDT -----  Call patient. Increase furosemide to 40 mg BID and add potassium 20 mE QD. See how his symptoms are doing. Repeat bmp and bnp before my follow up in 1 month. Weight and vitals daily to follow his fatigue and shortness of breath. SC

## 2021-08-05 NOTE — TELEPHONE ENCOUNTER
Pt called and is in agreement with medication changes. Advised pt that Rx's have already been sent to [facility] pharmacy. Pt verbalized understanding.

## 2021-08-05 NOTE — TELEPHONE ENCOUNTER
----- Message -----   From: Sebastian Denise M.D.   Sent: 8/5/2021  11:32 AM PDT   To: Wendi Moreno R.N.     Labs were reviewed and I hear you are experiencing more shortness of breath.  Recommend changing Lasix to twice daily as well as potassium twice daily.  Repeat the basic metabolic panel and BNP in 2 weeks time.      ---------------------------------------------------------------------------------------------  Called pt to advise of medication changes [phone]. No answer, left VM to call office. Rx's sent to [facility]: furosemide 40mg BID, Potasium 20mEq BID. Defer lab orders to SC, pt has FV on 8/11 0930.

## 2021-08-05 NOTE — TELEPHONE ENCOUNTER
Called pt [phone]. Pt states he is feeling about the same as before the change in furosemide to 40 mg BID and add potassium 20 mE QD.  C/o SOB and fatigue, but not worsening symptoms.  Pt has kept a log of BP/HR/and daily weight per SC. Pt will send log via [employer] for review. Verified pt next appt with SC on 8/11 at 0930.

## 2021-08-06 NOTE — PATIENT COMMUNICATION
----- Message -----   From: Alfred Diaz   Sent: 8/6/2021  11:10 AM PDT   To: Cristina Nino/Brent   Subject: Procedure Question                               I have tried to send requested bp log.  Not seeing how to do it with [email or web address].  Please have Darline assist..     I guess that may have worked.    -------------------------------------------------------  Called pt [phone] no answer, left VM.  Confirmed receipt of BP log via [employer]. Advised pt that I will forward log to SC for review.

## 2021-08-11 NOTE — PROGRESS NOTES
Chief Complaint   Patient presents with   • Atrial Fibrillation   • Hypertension     F/V Dx: PAH (pulmonary artery hypertension) ([facility])     Subjective:   Alfred Diaz is a 81 y.o. male who presents today for follow up to review progressive fatigue and shortness of breath after increase in furosemide dosing.    He is a patient of Dr. Denise in our office.    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    He presents today alone. He admits to having progressive fatigue over the last 4-5 months. He has to stop to walk due to these symptoms. He also has chronic back pain and joint pain which limits his mobility. He is working with PT, swimming, and also has appointments with vestibular balance therapy.     He has no chest pain, palpitations, lightheadedness, or syncope.    He does have mechanical falls due to his knees and tripping over objects in the house. He has had no injuries with these falls.    He just got a PCP appointment to establish care alongside a pulmonary/sleep medicine consultation to review treating his DERREK.    He does not sleep well at night and wakes up often to urinate. He was not able to tolerate the CPAP machine, so he doesn't use this. He hasn't been evaluated in sleep medicine clinic >10 years.    He has chronic LE edema that is worse on the right side from prior DVT. He has no pain in his legs or redness. He wears compression stockings daily. He decreased his lasix dosing on his own due to urinating often.    He has a pain stimulator in his back for chronic pain.    He gets short of breath and fatigued with exertion and wishes to be more active.    Past Medical History:   Diagnosis Date   • A-fib ([facility])    • Arthritis    • Chronic anticoagulation    • DVT (deep venous thrombosis) ([facility])    • Falls    • DERREK (obstructive sleep apnea)    • PAH (pulmonary  artery hypertension) (HCC)    • PE (pulmonary thromboembolism) (HCC)    • Venous insufficiency      History reviewed. No pertinent surgical history.  History reviewed. No pertinent family history.  Social History     Socioeconomic History   • Marital status:      Spouse name: Not on file   • Number of children: Not on file   • Years of education: Not on file   • Highest education level: Not on file   Occupational History   • Not on file   Tobacco Use   • Smoking status: Former Smoker     Quit date: 1955     Years since quittin.8   • Smokeless tobacco: Never Used   Substance and Sexual Activity   • Alcohol use: Yes     Comment: Moderate   • Drug use: Never   • Sexual activity: Not on file   Other Topics Concern   • Not on file   Social History Narrative   • Not on file     Social Determinants of Health     Financial Resource Strain:    • Difficulty of Paying Living Expenses:    Food Insecurity:    • Worried About Running Out of Food in the Last Year:    • Ran Out of Food in the Last Year:    Transportation Needs:    • Lack of Transportation (Medical):    • Lack of Transportation (Non-Medical):    Physical Activity:    • Days of Exercise per Week:    • Minutes of Exercise per Session:    Stress:    • Feeling of Stress :    Social Connections:    • Frequency of Communication with Friends and Family:    • Frequency of Social Gatherings with Friends and Family:    • Attends Baptism Services:    • Active Member of Clubs or Organizations:    • Attends Club or Organization Meetings:    • Marital Status:    Intimate Partner Violence:    • Fear of Current or Ex-Partner:    • Emotionally Abused:    • Physically Abused:    • Sexually Abused:      Allergies   Allergen Reactions   • Vicodin [Apap-Fd&C Yellow #10 [place]-Hydrocodone] Rash     Rash     Outpatient Encounter Medications as of 2021   Medication Sig Dispense Refill   • furosemide (LASIX) 40 MG Tab Take 1 tablet by mouth 2 times a day. 60 tablet  "2   • potassium chloride SA (KDUR) 20 MEQ Tab CR Take 1 tablet by mouth 2 times a day. 60 tablet 2   • ELIQUIS 5 MG Tab Take 5 mg by mouth 2 Times a Day.     • traMADol (ULTRAM) 50 MG Tab Take 50 mg by mouth 2 times a day.     • DILTIAZem (CARDIZEM) 120 MG Tab Take 120 mg by mouth every day.     • digoxin (LANOXIN) 250 MCG Tab Take 250 mcg by mouth every day.     • Acetaminophen (TYLENOL 8 HOUR PO) Take  by mouth.     • [DISCONTINUED] potassium Chloride ER (K-TAB) 20 MEQ Tab CR tablet TAKE 1 TABLET BY MOUTH TWICE A DAY (Patient not taking: Reported on 8/11/2021)       No facility-administered encounter medications on file as of 8/11/2021.     Review of Systems   Constitutional: Positive for malaise/fatigue. Negative for fever.   Respiratory: Positive for shortness of breath. Negative for cough.    Cardiovascular: Positive for leg swelling. Negative for chest pain, palpitations, orthopnea, claudication and PND.   Gastrointestinal: Negative for abdominal pain.   Musculoskeletal: Positive for back pain and joint pain. Negative for myalgias.   Neurological: Negative for dizziness.        Objective:   /72 (BP Location: Left arm, Patient Position: Sitting, BP Cuff Size: Adult)   Pulse 87   Resp 12   Ht 1.93 m (6' 4\")   Wt 101 kg (223 lb 3.2 oz)   SpO2 92%   BMI 27.17 kg/m²     Physical Exam   Constitutional: He is oriented to person, place, and time. He appears well-developed.   HENT:   Head: Normocephalic and atraumatic.   Neck: No JVD present.   Cardiovascular: Normal rate, regular rhythm, normal heart sounds and normal pulses.   Pulmonary/Chest: Effort normal and breath sounds normal.   Abdominal: Normal appearance.   Musculoskeletal:         General: Normal range of motion.      Right lower leg: Edema present.      Left lower leg: Edema present.   Neurological: He is alert and oriented to person, place, and time.   Skin: Skin is warm and dry. Capillary refill takes less than 2 seconds.   Psychiatric: His " behavior is normal. Judgment and thought content normal.   Nursing note and vitals reviewed.      Assessment:     1. Atrial fibrillation, unspecified type (HCC)     2. Diastolic dysfunction  Basic Metabolic Panel    proBrain Natriuretic Peptide, NT   3. Venous insufficiency of both lower extremities     4. Chronic anticoagulation     5. Deep vein thrombosis (DVT) of distal vein of right lower extremity, unspecified chronicity (HCC)     6. Other pulmonary embolism with acute cor pulmonale, unspecified chronicity (HCC)     7. PAH (pulmonary artery hypertension) (HCC)     8. DERREK (obstructive sleep apnea)       Medical Decision Making:  Today's Assessment / Status / Plan:     1. Chronic afib with diastolic dysfunction  -rate controlled on EKG, asymptomatic  -cont dig, dilt, and eliquis  -no bleeding on eliquis  -EKG hard to interpret with stimulator causing artifact  -consider biotel in the future if symptoms progress    2. DERREK, untreated with moderate PAH  -discussed worsening fatigue could be related to untreated DERREK  -pending sleep medicine consultation this month    3. DVT with PE, venous insufficiency  -provoked DVT and PE post surgical  -cont Eliquis  -legs edematous with 2-3+ pitting edema, discussed low Na diet, adequate hydration and compression stockings  -BNP elevated in 1000 range, cont increase dose of furosemide at 40 mg BID with K 20 mEq BID  -repeat BNP, CMP for eval in 2 weeks  -echo unremarkable except PAH, recommend treating DERREK if able to tolerate CPAP machine    Patient is to follow up with Nan CONDE in 3 months with BP check in 1-2 weeks with repeat labs

## 2021-08-16 PROBLEM — R71.8 ELEVATED MCV: Status: ACTIVE | Noted: 2021-01-01

## 2021-08-16 NOTE — PROGRESS NOTES
Subjective:     CC:    Chief Complaint   Patient presents with   • Establish Care       HISTORY OF THE PRESENT ILLNESS: Patient is a 81 y.o. male. This pleasant patient is here today to establish care.  Patient states that he did have care in [facility] that he was seen at went completely all the business and they shut it down patient is on able to get immunization records.  Patient thinks he may have gotten a colonoscopy maybe 5 or 6 years ago that was normal.  Patient is seen cardiology here and scheduled to see him back in late August.  Patient also has appointment scheduled for sleep clinic here.  Patient is also seen a provider for pain management here.      No problem-specific Assessment & Plan notes found for this encounter.      Allergies: Vicodin [apap-fd&c yellow #10 [place]-hydrocodone]    Current Outpatient Medications Ordered in Epic   Medication Sig Dispense Refill   • furosemide (LASIX) 40 MG Tab Take 1 tablet by mouth 2 times a day. 60 tablet 2   • potassium chloride SA (KDUR) 20 MEQ Tab CR Take 1 tablet by mouth 2 times a day. 60 tablet 2   • ELIQUIS 5 MG Tab Take 5 mg by mouth 2 Times a Day.     • DILTIAZem (CARDIZEM) 120 MG Tab Take 120 mg by mouth every day.     • digoxin (LANOXIN) 250 MCG Tab Take 250 mcg by mouth every day.     • Acetaminophen (TYLENOL 8 HOUR PO) Take  by mouth.     • traMADol (ULTRAM) 50 MG Tab Take 50 mg by mouth 2 times a day.       No current [facility]-ordered facility-administered medications on file.       Past Medical History:   Diagnosis Date   • A-fib ([facility])    • Arthritis    • Chronic anticoagulation    • DVT (deep venous thrombosis) ([facility])    • Falls    • DERREK (obstructive sleep apnea)    • PAH (pulmonary artery hypertension) ([facility])    • PE (pulmonary thromboembolism) ([facility])    • Venous insufficiency        History reviewed. No pertinent surgical history.    Social History     Tobacco Use   • Smoking status: Former Smoker     Quit date: 11/5/1955     Years  "since quittin.8   • Smokeless tobacco: Never Used   Vaping Use   • Vaping Use: Never used   Substance Use Topics   • Alcohol use: Yes     Comment: Moderate   • Drug use: Never       Social History     Social History Narrative   • Not on file       Family History   Problem Relation Age of Onset   • Cancer Mother    • Lung Disease Father        Health Maintenance: Completed    ROS:   Gen: no fevers/chills  Eyes: no changes in vision  ENT: no sore throat, no hearing loss, no bloody nose  Pulm: no sob, no cough  CV: no chest pain, no palpitations  GI: no nausea/vomiting, no diarrhea  : no dysuria  MSk: no myalgias  Skin: no rash  Neuro: no headaches, no numbness/tingling  Heme/Lymph: no easy bruising      Objective:     Exam: /78   Pulse 66   Temp 37 °C (98.6 °F)   Resp 18   Ht 1.93 m (6' 4\")   Wt 99.7 kg (219 lb 12.8 oz)   SpO2 98%  Body mass index is 26.75 kg/m².    Gen: Alert and oriented, No apparent distress.  Patient is hard of hearing and I need to take my mask off in order for him to read my lips  Skin: Warm and dry.  No obvious lesions.  Eyes: Sclera wnl Pupils normal in size  Lungs: Normal effort, CTA bilaterally, no wheezes, rhonchi, or rales  CV: Regular rate and rhythm. No murmurs, rubs, or gallops.  ABD: Soft non-tender no organomegaly  Musculoskeletal: Normal gait. No extremity cyanosis, clubbing, or edema.  Neuro: Oriented to person, place and time  Psych: Mood is wnl       Labs: We will recommend getting a CBC and a B12 level patient admits to drinking alcohol.  Patient scheduled to get number labs done by cardiology will order written patient can let them know that I also ordered some blood work    Assessment & Plan:   81 y.o. male with the following -    1. Atrial fibrillation, unspecified type (HCC)  Patient is seeing cardiology for this this is a chronic problem.  I did review his previous labs from cardiology that he had in July and August.  2. Deep vein thrombosis (DVT) of " distal vein of right lower extremity, unspecified chronicity (HCC)  Patient is seeing cardiology for this this is a chronic problem  3. PAH (pulmonary artery hypertension) (HCC)  This is a chronic problem  4. DERREK (obstructive sleep apnea)  Patient will be seen sleep clinic this is a chronic problem  5. Chronic anticoagulation  This is a chronic problem  6. Elevated MCV  We will check another CBC and a B12 level this is a chronic problem  Return in about 4 weeks (around 9/13/2021).    Please note that this dictation was created using voice recognition software. I have made every reasonable attempt to correct obvious errors, but I expect that there are errors of grammar and possibly content that I did not discover before finalizing the note.

## 2021-08-23 NOTE — PATIENT COMMUNICATION
Sebastian Denise M.D.  You 9 minutes ago (9:26 AM)     Ok      See 8/20/21 result note from [facility]. Pt does not wish to start the recommended pravastatin.

## 2021-08-24 NOTE — TELEPHONE ENCOUNTER
Spoke with patient during BP check visit. His at home BP cuff is not reading accurately comparatively to our manual checks today. Patient reported to MA his machine is 7 years old. He told me he hasn't changed the batteries in a while and wants to see if that helps. I told him my ultimate recommendation is to purchase a new machine/cuff so we can accurately treat him and monitor his VS. He understands, but wishes to still try changing batteries first. He will report back to us after trying.     FYI to Nan CONDE

## 2021-08-27 NOTE — PROGRESS NOTES
"  CC: \"I had sleep apnea in the past and tried CPAP for at least 3 years but I did not think it was worth the effort.  That was at least 10 years ago.\"    HPI:  Alfred Diaz is a 81 y.o. male kindly referred by Hallie Siddiqi M.D..  This patient has a hx of  atrial fibrillation that is chronic and persistent he is on chronic anticoagulation therapy.  He is also had deep vein thromboses as well as pulmonary embolus.  He has pulmonary hypertension and diastolic dysfunction.  He has venous insufficiency of bilateral lower extremities for which he uses compression stockings.  He has been referred here by his cardiologist as well as his general practitioner to reevaluate his sleep apnea and if necessary treated.      Sleep history:  Patient sleeps from 11 PM to 9 AM.  It takes him 15 minutes to fall asleep.  He wakes up 3-4 times at night.  He is never refreshed in the morning.  He takes 1 nap per day for 30 minutes and he does feel that is refreshing.  This patient is a former smoker he smoked less than 1 pack/day for 40 years.  He has 1 drink per day and at times will drink a caffeinated beverage.  He is retired.  He reports that he does hold his breath when he sleeps and that he snores loudly.  He at times has trouble breathing through his nose and that is why he generally tries not to sleep on his back.  He has never tried any sleep aids in the past.  Shoup score is 9 out of 24 which is not consistent with excessive daytime sleepiness              Symptom Summary:  Snoring: +  Very loud snoring: +  Witnessed apneas: +  Resuscitative snorts: +  Nocturnal shortness of breath: +  Non-restorative sleep: +  EPWORTH SCORE:    9/24, this is not consistent with excessive daytime sleepiness  Insomnia: -  Nocturnal awakenings: +  Nocturia: +  Fatigue: +  Tiredness: +  Falls asleep accidentally: Rarely  Napping or returning to bed after arising: Daily  Restless legs: -  Limb movements during sleep: -  Nocturnal " headaches: -  Morning Headaches: -      This patient denies any symptoms consistent with parasomnias, restless legs, and or narcolepsy.    Significant comorbidities and modifying factors include: see HPI      Patient Active Problem List    Diagnosis Date Noted   • Elevated MCV 2021   • Atrial fibrillation (HCC) 2021   • Diastolic dysfunction 2021   • Venous insufficiency of both lower extremities 2021   • Chronic anticoagulation 2021   • Deep vein thrombosis (DVT) of distal vein of lower extremity ([facility]) 2021   • Pulmonary embolus (HCC) 2021   • PAH (pulmonary artery hypertension) ([facility]) 2021   • DERREK (obstructive sleep apnea) 2021       Past Medical History:   Diagnosis Date   • A-fib ([facility])    • Arthritis    • Chronic anticoagulation    • DVT (deep venous thrombosis) ([facility])    • Falls    • DERREK (obstructive sleep apnea)    • PAH (pulmonary artery hypertension) ([facility])    • PE (pulmonary thromboembolism) ([facility])    • Venous insufficiency         Past Surgical History:   Procedure Laterality Date   • ARTHROPLASTY Bilateral     knees   • INSERTION PERMANENT SPINAL CORD STIMULATOR     • LAMINOTOMY      lumbar   • TONSILLECTOMY     • TURP-VAPOR         Family History   Problem Relation Age of Onset   • Cancer Mother    • Lung Disease Father        Social History     Socioeconomic History   • Marital status:      Spouse name: Not on file   • Number of children: Not on file   • Years of education: Not on file   • Highest education level: Not on file   Occupational History   • Not on file   Tobacco Use   • Smoking status: Former Smoker     Years: 3.00     Types: Cigars     Quit date: 1955     Years since quittin.8   • Smokeless tobacco: Never Used   Vaping Use   • Vaping Use: Never used   Substance and Sexual Activity   • Alcohol use: Yes     Comment: Moderate   • Drug use: Never   • Sexual activity: Not on file   Other Topics Concern   • Not on file   Social  "History Narrative   • Not on file     Social Determinants of Health     Financial Resource Strain:    • Difficulty of Paying Living Expenses:    Food Insecurity:    • Worried About Running Out of Food in the Last Year:    • Ran Out of Food in the Last Year:    Transportation Needs:    • Lack of Transportation (Medical):    • Lack of Transportation (Non-Medical):    Physical Activity:    • Days of Exercise per Week:    • Minutes of Exercise per Session:    Stress:    • Feeling of Stress :    Social Connections:    • Frequency of Communication with Friends and Family:    • Frequency of Social Gatherings with Friends and Family:    • Attends [employer] Services:    • Active Member of Clubs or Organizations:    • Attends Club or Organization Meetings:    • Marital Status:    Intimate Partner Violence:    • Fear of Current or Ex-Partner:    • Emotionally Abused:    • Physically Abused:    • Sexually Abused:            ALLERGIES: Vicodin [apap-fd&c yellow #10 [place]-hydrocodone]    ROS    Constitutional: Denies weight loss, endorses fatigue.  Ears/Nose/Mouth/Throat: Edentulous patient has only upper dentures at this time  Cardiovascular: Denies chest pain, tightness, palpitations, endorses swelling in legs/feet, difficulty breathing when lying down but gets better when sitting up.   Respiratory: Endorses shortness of breath while awake with exertion  Sleep: per HPI  Gastrointestinal: Denies  difficulty swallowing,  heartburn.  Genitourinary: REPORTS nocturia  Musculoskeletal: Endorses, back pain and knee pain.   Neurological: Denies frequent headaches,weakness, dizziness.    PHYSICAL EXAM    Neck circumference: 17.5 inches  /70 (BP Location: Left arm, Patient Position: Sitting, BP Cuff Size: Adult)   Pulse 77   Resp 16   Ht 1.93 m (6' 4\")   Wt 98.9 kg (218 lb)   SpO2 95%   BMI 26.54 kg/m²   Appearance: Well-nourished, well-developed,  looks stated age, no acute distress  Eyes:   EOMI  ENMT:Edentulous: patient " has only upper dentures at this time  Neck: Supple, trachea midline  Respiratory effort:  No intercostal retractions or use of accessory muscles  Lung auscultation: Normal breath sounds in all quadrants of the lung except for the left base.  No breath sounds heard  Cardiac: No murmurs, rubs, or gallops; regular rhythm, normal rate; no edema  Musculoskeletal: Antalgic gait, patient has a spine stimulator implanted posterior left hip approximately at level L5.  Neurologic:  oriented to person, time, place, and purpose; judgement intact  Psychiatric:  No depression, anxiety, agitation        Medical Decision Making:  The medical record was reviewed in its as pertains to this referral. This includes records from primary care, consultants notes,  referral request, hospital records, labs, imaging.    Any available diagnostic and titration nocturnal polysomnograms, home sleep apnea tests, continuous nocturnal oximetry results, multiple sleep latency tests, and compliance reports were reviewed with the patient.    Assessment:    1. Longstanding persistent atrial fibrillation (HCC)  - Polysomnography, 4 or More; Future    2. Anticoagulated  - Polysomnography, 4 or More; Future    3. Pulmonary HTN (HCC)  - Polysomnography, 4 or More; Future    4. Hx of pulmonary embolus  - Polysomnography, 4 or More; Future    5. Diastolic dysfunction  - Polysomnography, 4 or More; Future    6. Breathing-related sleep disorder    This patient has had a recent echo and has left side cardiac function that is within normal limits.  His left ventricular ejection fraction is preserved.  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    PLAN:       The patient has signs and symptoms consistent with obstructive sleep apnea hypopnea syndrome. Will schedule a nocturnal polysomnogram..  This patient has multiple comorbid conditions including atrial fib pulmonary hypertension and history of pulmonary embolus.  Patient will be tested  in lab with a split-night study,  split patient early at AHI greater than 5/h. This patient refuses any sleep medication for the study.    On physical exam there  are no breath sounds heard on the left lung base.  Patient is short of breath only on exertion.  However given the patient's history of pulmonary emboli, a chest x-ray (2 view)  has been ordered.    The risks of untreated sleep apnea were discussed with the patient at length. Patients with DERREK are at increased risk of cardiovascular disease including coronary artery disease, systemic arterial hypertension, pulmonary arterial hypertension, cardiac arrhythmias, and stroke. DERREK patients have an increased risk of motor vehicle accidents, type 2 diabetes, chronic kidney disease, and non-alcoholic liver disease. The patient was advised to avoid driving a motor vehicle when drowsy.        Have advised the patient to follow up with the appropriate healthcare practitioners for all other medical problems and issues.                RTC:  1-2 weeks   After  PSG.  With

## 2021-09-10 NOTE — PROCEDURES
Clinical Comments:   The patient underwent a split night polysomnogram with a CPAP titration using the standard montage for measurement of paramaters of sleep, respiratory events, movement abnormalities, heart rate and rhythm. A Microphone was used to monitior snoring.  Interpretation:  Study start time was 10:18:59 PM. Total recording time was 3h 44.5m (224 minutes) with a total sleep time of 2h 18.0m (138 minutes) resulting in a sleep efficiency of 61.47%.  Sleep latency from the start fo the study was 21 minutes minutes and REM latency from sleep onset was 00 minutes minutes.  Respiratory:   There were 28 apneas in total consisting of 6 obstructive apneas, 0 mixed apneas, and 22 central apneas. There were 114 hypopneas in total.  The apnea index was 12.17 per hour and the hypopnea index was 49.57 per hour.  The overall AHI was 61.7, with a REM AHI of 0.00, and a supine AHI of 69.12.  Limb Movements:  There were a total of 112 periodic leg movements, of which 10 were PLMS arousals. This resulted in a PLMS index of 48.7 and a PLMS arousal index of 4.3  Oximetry:  The mean SaO2 was 89.0% for the diagnostic portion of the study, with a minimum SaO2 of 79.0%.   Treatment:  Interpretation:  Treatment recording time was 4h 21.0m (261 minutes) with a total sleep time of 2h 53.5m (173 minutes) resulting in a sleep efficiency of 66.5%.   Sleep latency from the start of treatment was 17 minutes minutes and REM latency from sleep onset was 1h 28.0m minutes.   The patient had 84 arousals in total for an arousal index of 29.0.  Respiratory:   There were 46 apneas in total consisting of 0 obstructive apneas, 46 central apneas, and 0 mixed apneas for an apnea index of 15.91.   The patient had 44 hypopneas in total, which resulted in a hypopnea index of 15.22.   The overall AHI was 31.12, with a REM AHI of 12.00, and a supine AHI of 38.02.   Limb Movements:  There were a total of 45 periodic leg movements, of which 4 were PLMS  arousals. This resulted in a PLMS index of 15.6 and a PLMS arousal index of 1.4.  Oximetry:  The mean SaO2 during treatment was 90.0%, with a minimum oxygen saturation of 80.0%.  CPAP was tried from 6 to 10cm H2O.     RECORDING TECHNIQUE:       After the scalp was prepared, gold plated electrodes were applied to the scalp according to the International 10-20 System. EEG (electroencephalogram) was continuously monitored from the O1-M2, O2-M1, C3-M2, C4-M1, F3-M2, and F4-M1.   EOGs (electrooculograms) were monitored by electrodes placed at the left and right outer canthi.  Chin EMG (electromyogram) was monitored by electrodes placed on the mentalis and sub-mentalis muscles.  Nasal and oral airflow were monitored using a triple port thermocouple as well as oronasal pressure transducer.  Respiratory effort was measured by inductive plethysmography technology employing abdominal and thoracic belts.  Blood oxygen saturation and pulse were monitored by pulse oximetry.  Heart rhythm was monitored by surface electrocardiogram.  Leg EMG was studied using surface electrodes placed on left and right anterior tibialis.  A microphone was used to monitor tracheal sounds and snoring.  Body position was monitored and documented by technician observation      SUMMARY:    This was an overnight diagnostic polysomnogram with a subsequent positive airway pressure titration, also known as a split- night study.  The patient chose to use a large Vitera mask with heated humidification.    During the diagnostic phase the total recording time was 224 minutes, the sleep period time was 195 minutes, and the total sleep time was 138 minutes.  The patient's sleep efficiency was 61.47% which is reduced.  The patient experienced 0 REM period(s).    The sleep stage durations revealed 65 minutes of wake after sleep onset (WASO), 25 minutes of N1 sleep, 105 minutes of N2 sleep, *8 minutes of N3 sleep, and 0 minutes of REM.    The latency to sleep was  21 minutes which is normal.  The latency to REM was not applicable as the patient did not achieve REM.  Severe sleep fragmentation occurred.  The arousal index was 33.  The Limb Movement with Arousal Index was 4.8, the Total Limb Movement (isolated) Index was 3.9, and the PLM Series Index was 47.4.    The patient experienced 22 central and 6 obstructive apneas, 0 central and 114 obstructive hypopneas, 142 apneas and hypopneas, and 0 RERAS.  The apnea hypopnea index was 61.7, the RDI was 61.7, the mean event duration was 23.8 seconds, and the longest event lasted 44.8 seconds.  The REM index was 0 and the supine index was 69.1.  The apnea hypopnea index represents severe obstructive sleep apnea hypopnea syndrome.    The susana saturation during sleep was 79% and the patient spent 67.9% of the diagnostic recording with saturations less than or equal to 90%.    During sleep, the minimum heart rate was 50 bpm, the mean heart rate was 60 bpm, and the maximum heart rate was 76 bpm.    Once the patient met the split-night protocol, the technician performed a positive airway pressure titration.  The technician initiated treatment with CPAP 6 cmH2O and increased the pressure to a maximum of CPAP 10 cmH2O.    The patient did best on CPAP 7 cm water with a resultant AHI of 4.19, a minimum saturation of 83%, and a mean saturation of 90%.  However the patient fared poorly with higher or lower CPAP pressures likely secondary to treatment emergent central apnea.  During the treatment phase central apneas accounted for 51.1% of the respiratory events.  The patient's cardiac echo 12/8/2020 revealed an EF of 55%.  Therefore the patient is a candidate for a ResMed ASV titration.        ASSESSMENT:    Severe obstructive sleep apnea hypopnea - AHI 61.7  Persistent nocturnal desaturation - susana saturation 79% - saturations <90% for 67.9% of the diagnostic recording  Treatment emergent central apnea  Inconsistent response to CPAP  secondary to treatment emergent central apnea      RECOMMENDATION:    Recommend a ResMed ASV titration.            Dr. Jack Valdez MD

## 2021-09-13 PROBLEM — R19.00: Status: ACTIVE | Noted: 2021-01-01

## 2021-09-13 NOTE — PROGRESS NOTES
Subjective:     CC:   Chief Complaint   Patient presents with   • Follow-Up       HPI:   Alfred presents today to review his labs and also he has a bulge on the right lower abdominal area that he has had for about 2 to 3 years.  He thinks is gotten slightly larger.  The physical therapist who is working with him thought it may be a hernia.  Patient also did go to sleep clinic and I did study he is awaiting the results.  I did talk to patient about immunizations and wrote down what he needs flu shingles and Tdap.  Patient refuses to get a colonoscopy.  Also mention to patient to follow-up in a couple months for his annual wellness    Past Medical History:   Diagnosis Date   • A-fib (HCC)    • Arthritis    • Chronic anticoagulation    • DVT (deep venous thrombosis) ([facility])    • Falls    • DERREK (obstructive sleep apnea)    • PAH (pulmonary artery hypertension) ([facility])    • PE (pulmonary thromboembolism) ([facility])    • Venous insufficiency        Social History     Tobacco Use   • Smoking status: Former Smoker     Years: 3.00     Types: Cigars     Quit date: 1955     Years since quittin.9   • Smokeless tobacco: Never Used   Vaping Use   • Vaping Use: Never used   Substance Use Topics   • Alcohol use: Yes     Comment: Moderate   • Drug use: Never       Current Outpatient Medications Ordered in Epic   Medication Sig Dispense Refill   • potassium Chloride ER (K-TAB) 20 MEQ Tab CR tablet TAKE 1 TABLET BY MOUTH TWICE A DAY     • furosemide (LASIX) 40 MG Tab Take 1 Tablet by mouth 2 times a day. 60 Tablet 10   • potassium chloride SA (KDUR) 20 MEQ Tab CR Take 1 tablet by mouth 2 times a day. 60 tablet 2   • ELIQUIS 5 MG Tab Take 5 mg by mouth 2 Times a Day.     • traMADol (ULTRAM) 50 MG Tab Take 50 mg by mouth 2 times a day.     • DILTIAZem (CARDIZEM) 120 MG Tab Take 120 mg by mouth every day.     • digoxin (LANOXIN) 250 MCG Tab Take 250 mcg by mouth every day.     • Acetaminophen (TYLENOL 8 HOUR PO) Take  by mouth.       No  "current [facility]-ordered facility-administered medications on file.       Allergies:  Vicodin [apap-fd&c yellow #10 [place]-hydrocodone]    Health Maintenance: Completed    ROS:  Gen: no fevers/chills  Eyes: no changes in vision  ENT: no sore throat, no hearing loss, no bloody nose  Pulm: no sob, no cough  CV: no chest pain, no palpitations  GI: no nausea/vomiting, no diarrhea  Skin: no rash  Neuro: no headaches, no numbness/tingling  Heme/Lymph: no easy bruising    Objective:     Exam:  /74   Pulse 64   Temp 36.3 °C (97.4 °F)   Resp 16   Ht 1.93 m (6' 4\")   Wt 101 kg (223 lb 6.4 oz)   SpO2 95%   BMI 27.19 kg/m²  Body mass index is 27.19 kg/m².    Gen: Alert and oriented, No apparent distress.  Skin: Warm and dry.  No obvious lesions.  Eyes: Sclera wnl Pupils normal in size  Lungs: Normal effort, CTA bilaterally, no wheezes, rhonchi, or rales  CV: Regular rate and rhythm. No murmurs, rubs, or gallops.  ABD: Soft non-tender, patient does have an large possible hernia or mass noted in the right lower quadrant.  It does not extend into his testicular area.  It is above the inguinal area.  It is not tender on palpation  Musculoskeletal: Normal gait. No extremity cyanosis, clubbing, or edema.  Neuro: Oriented to person, place and time  Psych: Mood is wnl       Assessment & Plan:     81 y.o. male with the following -     1. Mass of abdomen of single quadrant  We will go ahead and order a CAT scan would like to see patient back after this to determine which specialty to refer him to.  This is a chronic problem  - CT-ABDOMEN-PELVIS W/O; Future    2. DERREK (obstructive sleep apnea)  Patient to follow-up with sleep clinic as planned this is a chronic problem    3. Elevated MCV  Patient's MCV is lower his B12 and folate is within normal limits we will just continue to monitor this is a chronic problem       Return in about 2 months (around 11/13/2021).    Please note that this dictation was created using voice " recognition software. I have made every reasonable attempt to correct obvious errors, but I expect that there are errors of grammar and possibly content that I did not discover before finalizing the note.

## 2021-10-25 NOTE — PROGRESS NOTES
Chief Complaint   Patient presents with   • Follow-Up     SS results       HPI:  Alfred Diaz is a 81 y.o. year old male here today for follow-up on sleep study results.  Last seen 8/27/2021 by Dr. Amin.  Patient had a previous diagnosis of CPAP but had stopped using therapy.  He has a history of A. fib that is chronic with chronic anticoagulation.  He also has a history of DVT and PEs with pulmonary hypertension and diastolic dysfunction.    Bedtime includes going to sleep between 11 PM and 9 AM with 3-4 episodes of nocturnal awakening.  He endorses that his sleep is nonrefreshing and usually takes 1 nap per day.  He is a former smoker with an approximate 40-pack-year history.    Patient had a split-night sleep study on 9/8/2021 which indicated severe DERREK with an AHI of 61.7 with persistent nocturnal desaturation of O2 susana of 79%.  Patient then had treatment emergent central apnea and an inconsistent response to CPAP.  Recommendations are for ResMed ASV titration.    ROS: As per HPI and otherwise negative if not stated.    Past Medical History:   Diagnosis Date   • A-fib (HCC)    • Arthritis    • Chronic anticoagulation    • DVT (deep venous thrombosis) ([facility])    • Falls    • DERREK (obstructive sleep apnea)    • PAH (pulmonary artery hypertension) ([facility])    • PE (pulmonary thromboembolism) ([facility])    • Venous insufficiency        Past Surgical History:   Procedure Laterality Date   • ARTHROPLASTY Bilateral     knees   • INSERTION PERMANENT SPINAL CORD STIMULATOR     • LAMINOTOMY      lumbar   • TONSILLECTOMY     • TURP-VAPOR         Family History   Problem Relation Age of Onset   • Cancer Mother    • Lung Disease Father        Allergies as of 10/25/2021 - Reviewed 09/13/2021   Allergen Reaction Noted   • Vicodin [apap-fd&c yellow #10 [place]-hydrocodone] Rash 11/05/2020        Vitals:  There were no vitals taken for this visit.    Current medications as of today   Current Outpatient Medications   Medication  Sig Dispense Refill   • potassium Chloride ER (K-TAB) 20 MEQ Tab CR tablet TAKE 1 TABLET BY MOUTH TWICE A  Tablet 3   • furosemide (LASIX) 40 MG Tab Take 1 Tablet by mouth 2 times a day. 60 Tablet 10   • ELIQUIS 5 MG Tab Take 5 mg by mouth 2 Times a Day.     • traMADol (ULTRAM) 50 MG Tab Take 50 mg by mouth 2 times a day.     • DILTIAZem (CARDIZEM) 120 MG Tab Take 120 mg by mouth every day.     • digoxin (LANOXIN) 250 MCG Tab Take 250 mcg by mouth every day.     • Acetaminophen (TYLENOL 8 HOUR PO) Take  by mouth.       No current facility-administered medications for this visit.         Physical Exam:   Gen:           Alert and oriented, No apparent distress. Mood and affect appropriate, normal interaction with examiner.  Eyes:          PERRL, EOM intact, sclere white, conjunctive moist.  Ears:          Not examined.   Hearing:     Grossly intact.  Nose:          Normal, no lesions or deformities.  Dentition:    Good dentition.  Oropharynx:   Tongue normal, posterior pharynx without erythema or exudate.  Neck:        Supple, trachea midline, no masses.  Respiratory Effort: No intercostal retractions or use of accessory muscles.   Lung Auscultation:      Clear to auscultation bilaterally; no rales, rhonchi or wheezing.  CV:            Regular rate and rhythm. No murmurs, rubs or gallops.  Abd:           Not examined.   Lymphadenopathy: Not examined.  Gait and Station: Normal.  Digits and Nails: No clubbing, cyanosis, petechiae, or nodes.   Cranial Nerves: II-XII grossly intact.  Skin:        No rashes, lesions or ulcers noted.               Ext:           No cyanosis or edema.      Assessment:  No diagnosis found.    Immunizations:    Flu: 10/25/2021  Pneumovax 23[place]  Prevnar 13[place]    Plan:  Reviewed sleep study results with patient which showed inconclusive of CPAP titration. Study also showed central emergent sleep apnea. We will reorder in lab ASV titration. Upon  completion of this test, I will review results and order specific device. I did offer Ambien for the night of the sleep study, however patient declined and stated he is currently on too many medications as it is.  Patient will follow up in approximately 4 months which will allow for 3 months of compliance as well as sleep study to be conducted. Reach out via phone or MyChart with any questions or concerns before next appointment.    Please note that this dictation was created using voice recognition software. I have made every reasonable attempt to correct obvious errors, but it is possible there are errors of grammar and possibly content that I did not discover before finalizing the note.

## 2021-10-25 NOTE — PATIENT INSTRUCTIONS
Reviewed sleep study results with patient which showed inconclusive of CPAP titration. Study also showed central emergent sleep apnea. We will reorder in lab ASV titration. Upon completion of this test, I will review results and order specific device. I did offer Ambien for the night of the sleep study, however patient declined and stated he is currently on too many medications as it is.  Patient will follow up in approximately 4 months which will allow for 3 months of compliance as well as sleep study to be conducted. Reach out via phone or MyChart with any questions or concerns before next appointment.

## 2021-11-03 NOTE — PROGRESS NOTES
Chief Complaint   Patient presents with   • Atrial Fibrillation   • Congestive Heart Failure     F/V Dx: Diastolic dysfunction     Subjective:   Alfred Diaz is a 81 y.o. male who presents today for follow up on leg swelling with increased dosage of furosemide.    He is a patient of Dr. Denise in our office.    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    He presents today alone. His leg swelling and shortness of breath have stabilized and improved on increased diuretic therapy, although he is frustrated with increased urination.    He also has chronic back pain and joint pain which limits his mobility.     He is working with PT, swimming, and also has appointments with vestibular balance therapy.     He has no chest pain, palpitations, lightheadedness, or syncope.    He has had no falls recently.    He has chronic LE edema that is worse on the right side from prior DVT. He has no pain in his legs or redness. He wears compression stockings daily.     He has a pain stimulator in his back for chronic pain.    He gets short of breath and fatigued with exertion and wishes to be more active.    Past Medical History:   Diagnosis Date   • A-fib ([facility])    • Arthritis    • Chronic anticoagulation    • DVT (deep venous thrombosis) ([facility])    • Falls    • DERREK (obstructive sleep apnea)    • PAH (pulmonary artery hypertension) ([facility])    • PE (pulmonary thromboembolism) ([facility])    • Venous insufficiency      Past Surgical History:   Procedure Laterality Date   • ARTHROPLASTY Bilateral     knees   • INSERTION PERMANENT SPINAL CORD STIMULATOR     • LAMINOTOMY      lumbar   • TONSILLECTOMY     • TURP-VAPOR       Family History   Problem Relation Age of Onset   • Cancer Mother    • Lung Disease Father      Social History     Socioeconomic History   • Marital status:      Spouse name:  Not on file   • Number of children: Not on file   • Years of education: Not on file   • Highest education level: Not on file   Occupational History   • Not on file   Tobacco Use   • Smoking status: Former Smoker     Years: 3.00     Types: Cigars     Quit date: 1955     Years since quittin.0   • Smokeless tobacco: Never Used   Vaping Use   • Vaping Use: Never used   Substance and Sexual Activity   • Alcohol use: Yes     Comment: Moderate   • Drug use: Never   • Sexual activity: Not on file   Other Topics Concern   • Not on file   Social History Narrative   • Not on file     Social Determinants of Health     Financial Resource Strain:    • Difficulty of Paying Living Expenses:    Food Insecurity:    • Worried About Running Out of Food in the Last Year:    • Ran Out of Food in the Last Year:    Transportation Needs:    • Lack of Transportation (Medical):    • Lack of Transportation (Non-Medical):    Physical Activity:    • Days of Exercise per Week:    • Minutes of Exercise per Session:    Stress:    • Feeling of Stress :    Social Connections:    • Frequency of Communication with Friends and Family:    • Frequency of Social Gatherings with Friends and Family:    • Attends Yaz[employer] Services:    • Active Member of Clubs or Organizations:    • Attends Club or Organization Meetings:    • Marital Status:    Intimate Partner Violence:    • Fear of Current or Ex-Partner:    • Emotionally Abused:    • Physically Abused:    • Sexually Abused:      Allergies   Allergen Reactions   • Vicodin [Apap-Fd&C Yellow #10 [place]-Hydrocodone] Rash     Rash     Outpatient Encounter Medications as of 11/3/2021   Medication Sig Dispense Refill   • potassium Chloride ER (K-TAB) 20 MEQ Tab CR tablet TAKE 1 TABLET BY MOUTH TWICE A  Tablet 3   • furosemide (LASIX) 40 MG Tab Take 1 Tablet by mouth 2 times a day. 60 Tablet 10   • ELIQUIS 5 MG Tab Take 5 mg by mouth 2 Times a Day.     • traMADol (ULTRAM) 50 MG Tab Take 50 mg by  "mouth 2 times a day.     • DILTIAZem (CARDIZEM) 120 MG Tab Take 120 mg by mouth every day.     • digoxin (LANOXIN) 250 MCG Tab Take 250 mcg by mouth every day.     • Acetaminophen (TYLENOL 8 HOUR PO) Take  by mouth.       No facility-administered encounter medications on file as of 11/3/2021.     Review of Systems   Constitutional: Positive for malaise/fatigue. Negative for fever.   Respiratory: Positive for shortness of breath. Negative for cough.    Cardiovascular: Positive for leg swelling. Negative for chest pain, palpitations, orthopnea, claudication and PND.   Gastrointestinal: Negative for abdominal pain.   Musculoskeletal: Positive for back pain and joint pain. Negative for myalgias.   Neurological: Negative for dizziness.        Objective:   /82 (BP Location: Left arm, Patient Position: Sitting, BP Cuff Size: Adult)   Pulse 72   Resp 14   Ht 1.93 m (6' 4\")   Wt 103 kg (227 lb)   SpO2 96%   BMI 27.63 kg/m²     Physical Exam  Vitals and nursing note reviewed.   Constitutional:       Appearance: Normal appearance. He is well-developed and normal weight.   HENT:      Head: Normocephalic and atraumatic.   Neck:      Vascular: No JVD.   Cardiovascular:      Rate and Rhythm: Normal rate and regular rhythm.      Pulses: Normal pulses.      Heart sounds: Normal heart sounds.   Pulmonary:      Effort: Pulmonary effort is normal.      Breath sounds: Normal breath sounds.   Musculoskeletal:         General: Normal range of motion.      Right lower leg: Edema present.      Left lower leg: Edema present.   Skin:     General: Skin is warm and dry.      Capillary Refill: Capillary refill takes less than 2 seconds.   Neurological:      General: No focal deficit present.      Mental Status: He is alert and oriented to person, place, and time. Mental status is at baseline.   Psychiatric:         Behavior: Behavior normal.         Thought Content: Thought content normal.         Judgment: Judgment normal. "         Assessment:     1. Diastolic dysfunction  Comp Metabolic Panel    proBrain Natriuretic Peptide, NT   2. Atrial fibrillation, unspecified type (HCC)     3. Venous insufficiency of both lower extremities     4. Chronic anticoagulation     5. Deep vein thrombosis (DVT) of distal vein of right lower extremity, unspecified chronicity (HCC)     6. Other pulmonary embolism with acute cor pulmonale, unspecified chronicity (HCC)     7. PAH (pulmonary artery hypertension) ([facility])     8. DERREK (obstructive sleep apnea)       Medical Decision Making:  Today's Assessment / Status / Plan:     1. Chronic afib with diastolic dysfunction  -rate controlled on EKG, asymptomatic  -cont dig, dilt, and eliquis  -no bleeding on eliquis  -EKG hard to interpret with stimulator causing artifact  -consider biotel in the future if symptoms progress    2. DERREK, untreated with moderate PAH  -discussed worsening fatigue could be related to untreated DERREK, he is getting this worked up now by pulmonary    3. DVT with PE, venous insufficiency  -provoked DVT and PE post surgical  -cont Eliquis  -legs edematous with 2-3+ pitting edema, discussed low Na diet, adequate hydration and compression stockings  -cont furosemide at 40 mg BID with K 20 mEq BID, move second dose to lunch time  -repeat BNP, CMP in 3 months  -echo unremarkable except PAH, recommend treating DERREK if able to tolerate CPAP machine    Patient is to follow up with Nan CONDE in 3 months with labs

## 2021-11-23 PROBLEM — R53.83 FATIGUE: Status: ACTIVE | Noted: 2021-01-01

## 2021-11-23 PROBLEM — K40.20 BILATERAL INGUINAL HERNIA WITHOUT OBSTRUCTION OR GANGRENE: Status: ACTIVE | Noted: 2021-01-01

## 2021-11-23 NOTE — PROGRESS NOTES
Subjective:     CC:   Chief Complaint   Patient presents with   • Follow-Up       HPI:   Alfred presents today for follow-up of his CT scan.  Patient did get additional sleep study done unfortunately it looks like provider has not reviewed it as of yet inform patient of this he had a done on .  Patient states that he does have fatigue this could be related to his sleep apnea.    Past Medical History:   Diagnosis Date   • A-fib (HCC)    • Arthritis    • Chronic anticoagulation    • DVT (deep venous thrombosis) ([facility])    • Falls    • DERREK (obstructive sleep apnea)    • PAH (pulmonary artery hypertension) ([facility])    • PE (pulmonary thromboembolism) ([facility])    • Venous insufficiency        Social History     Tobacco Use   • Smoking status: Former Smoker     Years: 3.00     Types: Cigars     Quit date: 1955     Years since quittin.0   • Smokeless tobacco: Never Used   Vaping Use   • Vaping Use: Never used   Substance Use Topics   • Alcohol use: Yes     Comment: Moderate   • Drug use: Never       Current Outpatient Medications Ordered in Epic   Medication Sig Dispense Refill   • potassium Chloride ER (K-TAB) 20 MEQ Tab CR tablet TAKE 1 TABLET BY MOUTH TWICE A  Tablet 3   • furosemide (LASIX) 40 MG Tab Take 1 Tablet by mouth 2 times a day. 60 Tablet 10   • ELIQUIS 5 MG Tab Take 5 mg by mouth 2 Times a Day.     • traMADol (ULTRAM) 50 MG Tab Take 50 mg by mouth 2 times a day.     • DILTIAZem (CARDIZEM) 120 MG Tab Take 120 mg by mouth every day.     • digoxin (LANOXIN) 250 MCG Tab Take 250 mcg by mouth every day.     • Acetaminophen (TYLENOL 8 HOUR PO) Take  by mouth.       No current Epic-ordered facility-administered medications on file.       Allergies:  Vicodin [apap-fd&c yellow #10 [place]-hydrocodone]    Health Maintenance: Completed    ROS:  Gen: no fevers/chills  Eyes: no changes in vision  ENT: no sore throat, no hearing loss, no bloody nose  Pulm: no sob, no cough  CV: no chest pain, no  "palpitations  GI: no nausea/vomiting, no diarrhea  Skin: no rash  Neuro: no headaches, no numbness/tingling  Heme/Lymph: no easy bruising    Objective:     Exam:  /78   Pulse 78   Temp 36.4 °C (97.6 °F)   Resp 16   Ht 1.93 m (6' 4\")   Wt 102 kg (224 lb 12.8 oz)   SpO2 96%   BMI 27.36 kg/m²  Body mass index is 27.36 kg/m².    Gen: Alert and oriented, No apparent distress.  Skin: Warm and dry.  No obvious lesions.  Eyes: Sclera wnl Pupils normal in size  Lungs: Normal effort, CTA bilaterally, no wheezes, rhonchi, or rales  CV: Regular rate and rhythm. No murmurs, rubs, or gallops.  ABD: Soft non-tender no organomegaly  Musculoskeletal: Normal gait. No extremity cyanosis, clubbing, or edema.  Neuro: Oriented to person, place and time  Psych: Mood is wnl         Assessment & Plan:     81 y.o. male with the following -     1. Bilateral inguinal hernia without obstruction or gangrene, recurrence not specified  I did go into detail on his CAT scan results.  On the right side patient does have a large inguinal hernia with some his small bowel along with the cecum and there radiologist said it is nonobstructive.  Patient does have a small hernia on his left side.  Patient does have some diverticulosis.  Patient was noted to also have a gallstone.  Patient has some cyst on the liver and kidney that the radiologist felt there were was no need for further work-up.  Patient at this point does not want surgery done I expressed to him my concern about that right inguinal hernia it could become obstructive meaning that for his small bowel would die they would have to do emergency surgery.  Patient will think about it and discuss it with his family.  I informed patient to avoid heavy lifting which could cause a hernia to worsen.  This is a chronic problem  2. DERREK (obstructive sleep apnea)  There is a sleep report but is not completely done yet I would recommend patient wait and give it another week or 2 I can also see " him back in a month.  This is a chronic problem  3. Other fatigue  The fatigue could be related elated to his sleep apnea if it is shown that he has that.  So we will wait and see what the report shows this is a chronic problem    Return in about 4 weeks (around 12/21/2021).    Please note that this dictation was created using voice recognition software. I have made every reasonable attempt to correct obvious errors, but I expect that there are errors of grammar and possibly content that I did not discover before finalizing the note.

## 2021-11-23 NOTE — PROCEDURES
MONTAGE: Standard  STUDY TYPE: Diagnostic  RECORDING TECHNIQUE:   After the scalp was prepared, gold plated electrodes were applied to the scalp according to the International 10-20 System. EEG (electroencephalogram) was continuously monitored from the O1-M2, O2-M1, C3-M2, C4-M1, F3-M2, and F4-M1. EOGs (electrooculograms) were monitored by electrodes placed at the left and right outer canthi. Chin EMG (electromyogram) was monitored by electrodes placed on the mentalis and sub-mentalis muscles. Nasal and oral airflow were monitored using a triple port thermocouple as well as oronasal pressure transducer. Respiratory effort was measured by inductive plethysmography technology employing abdominal and thoracic belts. Blood oxygen saturation and pulse were monitored by pulse oximetry. Heart rhythm was monitored by surface electrocardiogram. Leg EMG was studied using surface electrodes placed on left and right anterior tibialis. A microphone was used to monitor tracheal sounds and snoring. Body position was monitored and documented by technician observation.   SCORING CRITERIA:   A modification of the AASM manual for scoring of sleep and associated events was used. Obstructive apneas were scored by cessation of airflow for at least 10 seconds with continuing respiratory effort. Central apneas were scored by cessation of airflow for at least 10 seconds with no respiratory effort. Hypopneas were scored by a 30% or more reduction in airflow for at least 10 seconds accompanied by arterial oxygen desaturation of 3% or an arousal. For CMS (Medicare) patients, per AASM rule 1B, hypopneas are scored by 30% with mild reduction in airflow for at least 10 seconds accompanied by arterial saturation decreased at 4%.  Study start time was 09:24:14 PM. Diagnostic recording time was 8h 50.0m with a total sleep time of 6h 59.5m resulting in a sleep efficiency of 79.15%%. Sleep latency from the start of the study was 03 minutes and the  latency from sleep to REM was 164 minutes. In total,57 arousals were scored for an arousal index of 8.2.  Respiratory:  There were a total of 0 apneas consisting of 0 obstructive apneas, 0 mixed apneas, and 0 central apneas. A total of 58 hypopneas were scored. The apnea index was 0.00 per hour and the hypopnea index was 8.30 per hour resulting in an overall AHI of 8.30. AHI during rem was 7.7 and AHI while supine was 13.33.  Oximetry:  There was a mean oxygen saturation of 91.0%. The minimum oxygen saturation in NREM was 81.0 % and in REM was 85.0. The patient spent 25.5 minutes of TST with SaO2 <88%.  Cardiac:  The highest heart rate seen while awake was 89 BPM while the highest heart rate during sleep was 83 BPM with an average sleeping heart rate of 63 BPM.  Limb Movements:  There were a total of 658 PLMs during sleep which resulted in a PLMS index of 94.1. Of these, 40 were associated with arousals which resulted in a PLMS arousal index of 5.7.  Titration: ASV was tried from EPAP: 6, PS: 10/3 to EPAP: 8, 10/3 cm H2O.    CPAP Titration:  The PAP titration was initiated withASV EPAP: 6, PS: 10/3 cm of water and the pressure which was slowly titrated up in an attempt to eliminate sleep disordered breathing and snoring. The final pressure tested during the study was ASV EPAP: 8, 10/3 cm H2O. At this final pressure the patient was observed in non supine  REM sleep stage. The apnea hypopnea index improved to 1.8 per hour and O2 susana 89%. The average O2 stauration was 92%. He spent 25 min of sleep time below 88% O2 saturation. Snoring was resolved.  The patient utilized large vitera mask with heated humidification. The PAP was well-tolerated and there were minimal air leaks.     Impression:  1.  Obstructive sleep apnea   2.  Treatment emergent central apnea   3.  PLMS    Recommendations:  I recommend  ASV EPAP: 8, 10/3 cm H2O with large vitera mask. Recommended 30 day compliance download to assess the efficacy of the  recommended pressure and compliance for further outpatient monitoring and management of CPAP therapy. In some cases alternative treatment options may be proven effective in resolving sleep apnea. These options include upper airway surgery, the use of a dental orthotic, weight loss orpositional therapy. Clinical correlation is required. In general patients with sleep apnea are advised to avoid alcohol, sedatives and not to operate a motor vehicle while drowsy.  Untreated sleep apnea increases the risk for cardiovascular and neurovascular disease.

## 2021-12-04 NOTE — TELEPHONE ENCOUNTER
Reviewed results of titration study which indicated patient would benefit from ASV machine.  I did reach out via telephone and have a conversation with Mr. Diaz regarding these results.  Patient states that he is not ready to start Pap therapy at this time with a full facemask.  He experienced claustrophobia in the past and was intolerant of previous Pap therapy.  He states he will think about it for now but would like to set up a follow-up appointment with Dr. Rodriguez to discuss other alternative options.  I did discuss that he would possibly be able to start on supplemental oxygen at nighttime however that would be dependent on Medicare approval.  Patient states he understands and will think about his options.  He will call to schedule appointment as soon as possible.

## 2021-12-16 NOTE — PROGRESS NOTES
Subjective:     CC:   Chief Complaint   Patient presents with   • Follow-Up       HPI:   Alfred presents today for follow-up. Patient did discuss his hernia with his wife and daughter. And he is willing to go ahead see a surgeon. Patient feels that he is not having increased problems in that area.    Past Medical History:   Diagnosis Date   • A-fib (HCC)    • Arthritis    • Chronic anticoagulation    • DVT (deep venous thrombosis) ([facility])    • Falls    • DERREK (obstructive sleep apnea)    • PAH (pulmonary artery hypertension) (HCC)    • PE (pulmonary thromboembolism) ([facility])    • Venous insufficiency        Social History     Tobacco Use   • Smoking status: Former Smoker     Years: 3.00     Types: Cigars     Quit date: 1955     Years since quittin.1   • Smokeless tobacco: Never Used   Vaping Use   • Vaping Use: Never used   Substance Use Topics   • Alcohol use: Yes     Comment: Moderate   • Drug use: Never       Current Outpatient Medications Ordered in Epic   Medication Sig Dispense Refill   • potassium Chloride ER (K-TAB) 20 MEQ Tab CR tablet TAKE 1 TABLET BY MOUTH TWICE A  Tablet 3   • furosemide (LASIX) 40 MG Tab Take 1 Tablet by mouth 2 times a day. 60 Tablet 10   • ELIQUIS 5 MG Tab Take 5 mg by mouth 2 Times a Day.     • traMADol (ULTRAM) 50 MG Tab Take 50 mg by mouth 2 times a day.     • DILTIAZem (CARDIZEM) 120 MG Tab Take 120 mg by mouth every day.     • digoxin (LANOXIN) 250 MCG Tab Take 250 mcg by mouth every day.     • Acetaminophen (TYLENOL 8 HOUR PO) Take  by mouth.       No current Epic-ordered facility-administered medications on file.       Allergies:  Vicodin [apap-fd&c yellow #10 [place]-hydrocodone]    Health Maintenance: Completed    ROS:  Gen: no fevers/chills, no changes in weight  Eyes: no changes in vision  ENT: no sore throat, no hearing loss, no bloody nose  Pulm: no sob, no cough  CV: no chest pain, no palpitations  Skin: no rash  Neuro: no headaches, no  "numbness/tingling  Heme/Lymph: no easy bruising    Objective:     Exam:  /80   Pulse 80   Temp 36.4 °C (97.6 °F)   Resp 16   Ht 1.93 m (6' 4\")   Wt 103 kg (227 lb 6.4 oz)   SpO2 94%   BMI 27.68 kg/m²  Body mass index is 27.68 kg/m².    Gen: Alert and oriented, No apparent distress.  Skin: Warm and dry.  No obvious lesions.  Eyes: Sclera wnl Pupils normal in size  Lungs: Normal effort, CTA bilaterally, no wheezes, rhonchi, or rales  CV: Regular rate and rhythm. No murmurs, rubs, or gallops.  Musculoskeletal: Normal gait. No extremity cyanosis, clubbing, or edema.  Neuro: Oriented to person, place and time  Psych: Mood is wnl       Assessment & Plan:     81 y.o. male with the following -     1. Bilateral recurrent inguinal hernia without obstruction or gangrene  I did give patient warnings if he has increased pain or discomfort he should always go to emergency room. Patient feels he doesn't need a urgent referral but would just like a routine referral to our surgeon. I discussed with patient he may want to follow-up with cardiology and that he may need clearance if he does indeed get scheduled for hernia surgery. This is a chronic problem  2. DERREK (obstructive sleep apnea)  Patient does have appointment with the sleep clinic for follow-up of his sleep study this is a chronic problem  3. Wellness  I did remind patient that he is due for a tetanus shot and also shingles. Patient is planning on getting his booster for Covid will wait on the other 2    Return if symptoms worsen or fail to improve.    Please note that this dictation was created using voice recognition software. I have made every reasonable attempt to correct obvious errors, but I expect that there are errors of grammar and possibly content that I did not discover before finalizing the note.  "

## 2021-12-22 NOTE — PROGRESS NOTES
CC:         HPI:  Alfred Diaz is a 81 y.o.male  kindly referred by Hallie Siddiqi M.D.This patient has a hx of  atrial fibrillation that is chronic and persistent he is on chronic anticoagulation therapy.  He is also had deep vein thromboses as well as pulmonary embolus.  He has pulmonary hypertension and diastolic dysfunction.  He has venous insufficiency of bilateral lower extremities for which he uses compression stockings.  He has been referred here by his cardiologist as well as his general practitioner to reevaluate his sleep apnea and if necessary treated.  He reports that he had tried CPAP in the past and it was not to his liking.        Sleep history:  Patient sleeps from 11 PM to 9 AM.  It takes him 15 minutes to fall asleep.  He wakes up 3-4 times at night.  He is never refreshed in the morning.  He takes 1 nap per day for 30 minutes and he does feel that is refreshing.  This patient is a former smoker he smoked less than 1 pack/day for 40 years.  He has 1 drink per day and at times will drink a caffeinated beverage.  He is retired.  He reports that he does hold his breath when he sleeps and that he snores loudly.  He at times has trouble breathing through his nose and that is why he generally tries not to sleep on his back.  He has never tried any sleep aids in the past.  Summit score is 9 out of 24 which is not consistent with excessive daytime sleepiness    Sleep study results:  TYPE: ASV titration  DATE: 11/17/2021  TREATMENT AHI: 8.3  TREATMENT Oxygen Masood: 80.0 with 10.5 minutes less than 88%  TITRATION: ASV          Patient Active Problem List    Diagnosis Date Noted   • Bilateral inguinal hernia without obstruction or gangrene 11/23/2021   • Fatigue 11/23/2021   • Mass of abdomen of single quadrant 09/13/2021   • Elevated MCV 08/16/2021   • Atrial fibrillation (HCC) 07/07/2021   • Diastolic dysfunction 07/07/2021   • Venous insufficiency of both lower extremities 07/07/2021   • Chronic  anticoagulation 2021   • Deep vein thrombosis (DVT) of distal vein of lower extremity ([facility]) 2021   • Pulmonary embolus ([facility]) 2021   • PAH (pulmonary artery hypertension) ([facility]) 2021   • DERREK (obstructive sleep apnea) 2021       Past Medical History:   Diagnosis Date   • A-fib ([facility])    • Arthritis    • Chronic anticoagulation    • DVT (deep venous thrombosis) ([facility])    • Falls    • DERREK (obstructive sleep apnea)    • PAH (pulmonary artery hypertension) ([facility])    • PE (pulmonary thromboembolism) ([facility])    • Venous insufficiency         Past Surgical History:   Procedure Laterality Date   • ARTHROPLASTY Bilateral     knees   • INSERTION PERMANENT SPINAL CORD STIMULATOR     • LAMINOTOMY      lumbar   • TONSILLECTOMY     • TURP-VAPOR         Family History   Problem Relation Age of Onset   • Cancer Mother    • Lung Disease Father        Social History     Socioeconomic History   • Marital status:      Spouse name: Not on file   • Number of children: Not on file   • Years of education: Not on file   • Highest education level: Not on file   Occupational History   • Not on file   Tobacco Use   • Smoking status: Former Smoker     Years: 3.00     Types: Cigars     Quit date: 1955     Years since quittin.1   • Smokeless tobacco: Never Used   Vaping Use   • Vaping Use: Never used   Substance and Sexual Activity   • Alcohol use: Yes     Comment: Moderate   • Drug use: Never   • Sexual activity: Not on file   Other Topics Concern   • Not on file   Social History Narrative   • Not on file     Social Determinants of Health     Financial Resource Strain:    • Difficulty of Paying Living Expenses: Not on file   Food Insecurity:    • Worried About Running Out of Food in the Last Year: Not on file   • Ran Out of Food in the Last Year: Not on file   Transportation Needs:    • Lack of Transportation (Medical): Not on file   • Lack of Transportation (Non-Medical): Not on file   Physical Activity:   "  • Days of Exercise per Week: Not on file   • Minutes of Exercise per Session: Not on file   Stress:    • Feeling of Stress : Not on file   Social Connections:    • Frequency of Communication with Friends and Family: Not on file   • Frequency of Social Gatherings with Friends and Family: Not on file   • Attends [employer] Services: Not on file   • Active Member of Clubs or Organizations: Not on file   • Attends Club or Organization Meetings: Not on file   • Marital Status: Not on file   Intimate Partner Violence:    • Fear of Current or Ex-Partner: Not on file   • Emotionally Abused: Not on file   • Physically Abused: Not on file   • Sexually Abused: Not on file   Housing Stability:    • Unable to Pay for Housing in the Last Year: Not on file   • Number of Places Lived in the Last Year: Not on file   • Unstable Housing in the Last Year: Not on file       Current Outpatient Medications   Medication Sig Dispense Refill   • potassium Chloride ER (K-TAB) 20 MEQ Tab CR tablet TAKE 1 TABLET BY MOUTH TWICE A  Tablet 3   • furosemide (LASIX) 40 MG Tab Take 1 Tablet by mouth 2 times a day. 60 Tablet 10   • ELIQUIS 5 MG Tab Take 5 mg by mouth 2 Times a Day.     • traMADol (ULTRAM) 50 MG Tab Take 50 mg by mouth 2 times a day.     • DILTIAZem (CARDIZEM) 120 MG Tab Take 120 mg by mouth every day.     • digoxin (LANOXIN) 250 MCG Tab Take 250 mcg by mouth every day.     • Acetaminophen (TYLENOL 8 HOUR PO) Take  by mouth.       No current facility-administered medications for this visit.    \"CURRENT RX\"    ALLERGIES: Vicodin [apap-fd&c yellow #10 [place]-hydrocodone] and Atorvastatin    ROS    Constitutional: Denies fever, chills, sweats,  weight loss, fatigue  Cardiovascular: Denies chest pain, tightness, palpitations, swelling in legs/feet, fainting, difficulty breathing when lying down but gets better when sitting up.   Respiratory: Denies shortness of breath, cough, sputum, wheezing, painful breathing, coughing up blood. " "  Sleep: per HPI  Gastrointestinal: Denies  difficulty swallowing, nausea, abdominal pain, diarrhea, constipation, heartburn.  Musculoskeletal: Denies painful joints, sore muscles, back pain.        PHYSICAL EXAM    /100 (BP Location: Left arm, Patient Position: Sitting, BP Cuff Size: Large adult)   Pulse 85   Resp 16   Ht 1.93 m (6' 4\")   Wt 99.3 kg (219 lb)   SpO2 94%   Appearance: Well-nourished, looks stated age no acute distress  Eyes:  PERRLA, EOMI  ENMT: masked  Neck: Supple, trachea midline, no masses  Respiratory effort:  No intercostal retractions or use of accessory muscles  Musculoskeletal:  Grossly normal; gait and station normal;  Psychiatric:  No depression, anxiety, agitation      Medical Decision Making       The medical record was reviewed in its entirety including the referral notes, records from primary care, consultants notes, hospital records, lab, imaging, microbiology, immunology, and immunizations.  Care gaps identified and reviewed with the patient.    Diagnostic and titration nocturnal polysomnogram's, home sleep apnea tests, continuous nocturnal oximetry results, multiple sleep latency tests, and compliance reports reviewed.    ASSESSMENT/PLAN:    1. Complex sleep apnea syndrome  -  ResMed ASV PAP therapy    2. Pulmonary HTN (HCC)    - Referral to Pulmonary and Sleep Medicine    3. Pulmonary embolism, unspecified chronicity, unspecified pulmonary embolism type, unspecified whether acute cor pulmonale present (HCC)    - Referral to Pulmonary and Sleep Medicine        Due to the severity of this patient's complex sleep apnea he will be started on a ResMed ASV with an EPAP of 9 and pressure support of 3/10. This resulted in a decrease of his AHI to 1.8 and stabilized his oxygen at 92.0% during the study     Once you receive your CPAP/BIPAP/ASV , it is very important that you follow up with our office between 31-90 days after starting therapy. This is a very important time frame " "for insurance purposes.If you do not follow up with our office for a compliance check, your insurance company may not continue paying for you machine and equipment. If you become \" Non - Compliant \" you may have to surrender your machine to the DME Company and start the process again with another office visit and study. It is recommended that during your compliance period,   that you use your machine EVERY DAY for more than 4 hours.. If you are needing pressures adjusted you may call our office for a pressure adjustment.  If you are have having mask problems please contact your DME company.        For your 1st Compliance Office Visit with [facility] , you will need to bring your entire machine to your visit. We may need  to download yourcompliance from the SD card in your machine.      The risks of untreated sleep apnea were discussed with the patient at length. Patients with DERREK are at increased risk of cardiovascular disease including coronary artery disease, systemic arterial hypertension, pulmonary arterial hypertension, cardiac arrythmias, and stroke. DERREK patients have an increased risk of motor vehicle accidents, type 2 diabetes, chronic kidney disease, and non-alcoholic liver disease. The patient was advised to avoid driving a motor vehicle when drowsy.    RTC: 10 weeks  For first compliance check  "

## 2021-12-28 NOTE — TELEPHONE ENCOUNTER
----- Message from Kaylen Palomares [facility] sent at 12/28/2021 12:31 PM PST -----  Regarding: FW: digoxin Prescription    ----- Message -----  From: Alfred Diaz  Sent: 12/28/2021  12:00 PM PST  To: Cristina Nino/Brent  Subject: digoxin Prescription                             Hi,  Sorry to bother you again but between Medicare, [employer] and [facility] - they need a prior authorization from you - marked URGENT to fill the prescription.  I have just spent over a half an hour on the phone with [facility], [facility] and some in the background processing company trying to find out why.  You should be hearing from somebody, I am guessing [facility] that this is needed.  I am totally frustrated at this point.      They claim this authorization is needed even though I have been getting this for multiple years now.    It is no wonder that the medical industry is messed up.      Let me know if you need me to do anything at this pint.  I have enough digoxin for this week.    Thanks ,  Alfred

## 2021-12-28 NOTE — TELEPHONE ENCOUNTER
----- Message from Kaylen Palomares [facility] sent at 12/28/2021 12:31 PM PST -----  Regarding: FW: digoxin Prescription    ----- Message -----  From: Alfred Diaz  Sent: 12/28/2021  12:00 PM PST  To: Cristina Nino/Brent  Subject: digoxin Prescription                             Hi,  Sorry to bother you again but between Medicare, [employer] and [facility] - they need a prior authorization from you - marked URGENT to fill the prescription.  I have just spent over a half an hour on the phone with [facility], [facility] and some in the background processing company trying to find out why.  You should be hearing from somebody, I am guessing [facility] that this is needed.  I am totally frustrated at this point.      They claim this authorization is needed even though I have been getting this for multiple years now.    It is no wonder that the medical industry is messed up.      Let me know if you need me to do anything at this pint.  I have enough digoxin for this week.    Thanks ,  Alfred

## 2021-12-29 NOTE — TELEPHONE ENCOUNTER
ELIZABETH Yousif from [employer] is calling in, they are needing a DX code, and to have other information answered for them in order to get the prior authorization for this patient and the medications.  Please reach out to her @ [phone]    Thank you  Ching CHIN

## 2021-12-29 NOTE — TELEPHONE ENCOUNTER
Received approval letter for Digoxin.    Called patient to notify him, patient encouraged to CB if anything else is needed.

## 2021-12-29 NOTE — TELEPHONE ENCOUNTER
Alfred Diaz Key: [id number] - PA Case ID: [id number] - Rx #: [id number] help? Call us at [phone]  Outcome  Additional Information Required  There is an existing case within the [facility] environment that has the same patient, prescriber, and drug. This case must be finalized before proceeding with similar requests.  Drug  Digoxin 250MCG tablets  Form  [employer] Medicare Electronic PA Form (2017 NCPDP)  Original Claim Info  28,142 PRECERT REQ BY MD; USE [employer] OR [employer]DC[employer] FOR REVIEW [phone] REQUIRES PRIOR AUTHORIZATION

## 2022-01-01 ENCOUNTER — APPOINTMENT (OUTPATIENT)
Dept: RADIOLOGY | Facility: MEDICAL CENTER | Age: 82
DRG: 023 | End: 2022-01-01
Attending: NURSE PRACTITIONER
Payer: MEDICARE

## 2022-01-01 ENCOUNTER — HOSPITAL ENCOUNTER (OUTPATIENT)
Dept: RADIOLOGY | Facility: MEDICAL CENTER | Age: 82
End: 2022-07-14
Attending: NURSE PRACTITIONER
Payer: COMMERCIAL

## 2022-01-01 ENCOUNTER — HOSPITAL ENCOUNTER (OUTPATIENT)
Dept: RADIOLOGY | Facility: MEDICAL CENTER | Age: 82
End: 2022-02-03
Attending: INTERNAL MEDICINE
Payer: MEDICARE

## 2022-01-01 ENCOUNTER — NON-PROVIDER VISIT (OUTPATIENT)
Dept: SLEEP MEDICINE | Facility: MEDICAL CENTER | Age: 82
End: 2022-01-01
Attending: INTERNAL MEDICINE
Payer: MEDICARE

## 2022-01-01 ENCOUNTER — PATIENT MESSAGE (OUTPATIENT)
Dept: SLEEP MEDICINE | Facility: MEDICAL CENTER | Age: 82
End: 2022-01-01
Payer: MEDICARE

## 2022-01-01 ENCOUNTER — PATIENT MESSAGE (OUTPATIENT)
Dept: SLEEP MEDICINE | Facility: MEDICAL CENTER | Age: 82
End: 2022-01-01

## 2022-01-01 ENCOUNTER — DOCUMENTATION (OUTPATIENT)
Dept: CARDIOLOGY | Facility: MEDICAL CENTER | Age: 82
End: 2022-01-01

## 2022-01-01 ENCOUNTER — PATIENT MESSAGE (OUTPATIENT)
Dept: CARDIOLOGY | Facility: MEDICAL CENTER | Age: 82
End: 2022-01-01

## 2022-01-01 ENCOUNTER — HOME CARE VISIT (OUTPATIENT)
Dept: HOSPICE | Facility: HOSPICE | Age: 82
End: 2022-01-01
Payer: COMMERCIAL

## 2022-01-01 ENCOUNTER — ANESTHESIA (OUTPATIENT)
Dept: SURGERY | Facility: MEDICAL CENTER | Age: 82
End: 2022-01-01
Payer: MEDICARE

## 2022-01-01 ENCOUNTER — APPOINTMENT (OUTPATIENT)
Dept: RADIOLOGY | Facility: MEDICAL CENTER | Age: 82
DRG: 023 | End: 2022-01-01
Attending: INTERNAL MEDICINE
Payer: MEDICARE

## 2022-01-01 ENCOUNTER — ANESTHESIA (OUTPATIENT)
Dept: SURGERY | Facility: MEDICAL CENTER | Age: 82
DRG: 023 | End: 2022-01-01
Payer: MEDICARE

## 2022-01-01 ENCOUNTER — APPOINTMENT (OUTPATIENT)
Dept: RADIOLOGY | Facility: MEDICAL CENTER | Age: 82
DRG: 023 | End: 2022-01-01
Attending: EMERGENCY MEDICINE
Payer: MEDICARE

## 2022-01-01 ENCOUNTER — ANESTHESIA EVENT (OUTPATIENT)
Dept: SURGERY | Facility: MEDICAL CENTER | Age: 82
End: 2022-01-01
Payer: MEDICARE

## 2022-01-01 ENCOUNTER — HOSPITAL ENCOUNTER (OUTPATIENT)
Dept: RADIOLOGY | Facility: MEDICAL CENTER | Age: 82
End: 2022-01-28
Attending: INTERNAL MEDICINE
Payer: MEDICARE

## 2022-01-01 ENCOUNTER — OFFICE VISIT (OUTPATIENT)
Dept: SLEEP MEDICINE | Facility: MEDICAL CENTER | Age: 82
End: 2022-01-01
Payer: MEDICARE

## 2022-01-01 ENCOUNTER — TELEPHONE (OUTPATIENT)
Dept: CARDIOLOGY | Facility: MEDICAL CENTER | Age: 82
End: 2022-01-01
Payer: MEDICARE

## 2022-01-01 ENCOUNTER — HOSPITAL ENCOUNTER (OUTPATIENT)
Facility: MEDICAL CENTER | Age: 82
End: 2022-03-24
Attending: SURGERY | Admitting: SURGERY
Payer: MEDICARE

## 2022-01-01 ENCOUNTER — ANESTHESIA EVENT (OUTPATIENT)
Dept: SURGERY | Facility: MEDICAL CENTER | Age: 82
DRG: 023 | End: 2022-01-01
Payer: MEDICARE

## 2022-01-01 ENCOUNTER — PATIENT MESSAGE (OUTPATIENT)
Dept: CARDIOLOGY | Facility: MEDICAL CENTER | Age: 82
End: 2022-01-01
Payer: COMMERCIAL

## 2022-01-01 ENCOUNTER — HOSPITAL ENCOUNTER (OUTPATIENT)
Dept: LAB | Facility: MEDICAL CENTER | Age: 82
End: 2022-01-31
Attending: INTERNAL MEDICINE
Payer: MEDICARE

## 2022-01-01 ENCOUNTER — HOSPITAL ENCOUNTER (OUTPATIENT)
Dept: RADIOLOGY | Facility: MEDICAL CENTER | Age: 82
End: 2022-07-12
Attending: RADIOLOGY
Payer: COMMERCIAL

## 2022-01-01 ENCOUNTER — HOSPITAL ENCOUNTER (INPATIENT)
Facility: MEDICAL CENTER | Age: 82
LOS: 9 days | DRG: 023 | End: 2022-07-20
Attending: EMERGENCY MEDICINE | Admitting: STUDENT IN AN ORGANIZED HEALTH CARE EDUCATION/TRAINING PROGRAM
Payer: MEDICARE

## 2022-01-01 ENCOUNTER — APPOINTMENT (OUTPATIENT)
Dept: RADIOLOGY | Facility: MEDICAL CENTER | Age: 82
DRG: 023 | End: 2022-01-01
Attending: STUDENT IN AN ORGANIZED HEALTH CARE EDUCATION/TRAINING PROGRAM
Payer: MEDICARE

## 2022-01-01 ENCOUNTER — OFFICE VISIT (OUTPATIENT)
Dept: MEDICAL GROUP | Facility: PHYSICIAN GROUP | Age: 82
End: 2022-01-01
Payer: MEDICARE

## 2022-01-01 ENCOUNTER — HOSPICE ADMISSION (OUTPATIENT)
Dept: HOSPICE | Facility: HOSPICE | Age: 82
End: 2022-01-01
Payer: COMMERCIAL

## 2022-01-01 ENCOUNTER — PRE-ADMISSION TESTING (OUTPATIENT)
Dept: ADMISSIONS | Facility: MEDICAL CENTER | Age: 82
End: 2022-01-01
Attending: SURGERY
Payer: MEDICARE

## 2022-01-01 ENCOUNTER — TELEPHONE (OUTPATIENT)
Dept: CARDIOLOGY | Facility: MEDICAL CENTER | Age: 82
End: 2022-01-01

## 2022-01-01 VITALS
WEIGHT: 232 LBS | HEIGHT: 76 IN | BODY MASS INDEX: 28.25 KG/M2 | HEART RATE: 68 BPM | OXYGEN SATURATION: 94 % | SYSTOLIC BLOOD PRESSURE: 116 MMHG | DIASTOLIC BLOOD PRESSURE: 70 MMHG | RESPIRATION RATE: 16 BRPM

## 2022-01-01 VITALS
RESPIRATION RATE: 16 BRPM | BODY MASS INDEX: 27.28 KG/M2 | WEIGHT: 224 LBS | HEIGHT: 76 IN | TEMPERATURE: 97.3 F | DIASTOLIC BLOOD PRESSURE: 66 MMHG | HEART RATE: 53 BPM | SYSTOLIC BLOOD PRESSURE: 126 MMHG | OXYGEN SATURATION: 94 %

## 2022-01-01 VITALS
SYSTOLIC BLOOD PRESSURE: 121 MMHG | HEART RATE: 75 BPM | RESPIRATION RATE: 16 BRPM | WEIGHT: 225.97 LBS | TEMPERATURE: 98 F | HEIGHT: 76 IN | OXYGEN SATURATION: 95 % | BODY MASS INDEX: 27.52 KG/M2 | DIASTOLIC BLOOD PRESSURE: 59 MMHG

## 2022-01-01 VITALS
HEIGHT: 76 IN | OXYGEN SATURATION: 77 % | RESPIRATION RATE: 22 BRPM | SYSTOLIC BLOOD PRESSURE: 95 MMHG | WEIGHT: 242.51 LBS | DIASTOLIC BLOOD PRESSURE: 62 MMHG | TEMPERATURE: 99 F | BODY MASS INDEX: 29.53 KG/M2 | HEART RATE: 105 BPM

## 2022-01-01 VITALS — BODY MASS INDEX: 29.13 KG/M2 | WEIGHT: 227 LBS | HEIGHT: 74 IN

## 2022-01-01 VITALS
OXYGEN SATURATION: 94 % | HEIGHT: 76 IN | HEART RATE: 73 BPM | DIASTOLIC BLOOD PRESSURE: 90 MMHG | WEIGHT: 235 LBS | RESPIRATION RATE: 14 BRPM | BODY MASS INDEX: 28.62 KG/M2 | SYSTOLIC BLOOD PRESSURE: 144 MMHG

## 2022-01-01 VITALS
HEIGHT: 76 IN | TEMPERATURE: 97.9 F | WEIGHT: 227.2 LBS | SYSTOLIC BLOOD PRESSURE: 122 MMHG | DIASTOLIC BLOOD PRESSURE: 74 MMHG | OXYGEN SATURATION: 94 % | RESPIRATION RATE: 16 BRPM | BODY MASS INDEX: 27.67 KG/M2 | HEART RATE: 77 BPM

## 2022-01-01 DIAGNOSIS — R06.09 DOE (DYSPNEA ON EXERTION): ICD-10-CM

## 2022-01-01 DIAGNOSIS — I27.20 PULMONARY HYPERTENSION (HCC): ICD-10-CM

## 2022-01-01 DIAGNOSIS — I48.21 PERMANENT ATRIAL FIBRILLATION (HCC): ICD-10-CM

## 2022-01-01 DIAGNOSIS — I63.511 ACUTE RIGHT ARTERIAL ISCHEMIC STROKE, MCA (MIDDLE CEREBRAL ARTERY) (HCC): ICD-10-CM

## 2022-01-01 DIAGNOSIS — G47.33 OSA (OBSTRUCTIVE SLEEP APNEA): ICD-10-CM

## 2022-01-01 DIAGNOSIS — Z01.812 PRE-OPERATIVE LABORATORY EXAMINATION: ICD-10-CM

## 2022-01-01 DIAGNOSIS — Z86.711 HISTORY OF PULMONARY EMBOLISM: ICD-10-CM

## 2022-01-01 DIAGNOSIS — K40.21 BILATERAL RECURRENT INGUINAL HERNIA WITHOUT OBSTRUCTION OR GANGRENE: ICD-10-CM

## 2022-01-01 DIAGNOSIS — J98.6 ELEVATED HEMIDIAPHRAGM: ICD-10-CM

## 2022-01-01 DIAGNOSIS — I27.21 PAH (PULMONARY ARTERY HYPERTENSION) (HCC): ICD-10-CM

## 2022-01-01 DIAGNOSIS — I63.511 ACUTE RIGHT ARTERIAL ISCHEMIC STROKE, MIDDLE CEREBRAL ARTERY (MCA) (HCC): ICD-10-CM

## 2022-01-01 DIAGNOSIS — I51.89 DIASTOLIC DYSFUNCTION: ICD-10-CM

## 2022-01-01 DIAGNOSIS — Z78.9: ICD-10-CM

## 2022-01-01 DIAGNOSIS — Z01.810 PRE-OPERATIVE CARDIOVASCULAR EXAMINATION: ICD-10-CM

## 2022-01-01 DIAGNOSIS — G47.31 COMPLEX SLEEP APNEA SYNDROME: ICD-10-CM

## 2022-01-01 DIAGNOSIS — J98.4 RESTRICTIVE LUNG DISEASE: ICD-10-CM

## 2022-01-01 LAB
ABO + RH BLD: NORMAL
ABO GROUP BLD: NORMAL
ACT BLD: 196 SEC (ref 74–137)
ALBUMIN SERPL BCP-MCNC: 3.7 G/DL (ref 3.2–4.9)
ALBUMIN SERPL BCP-MCNC: 3.8 G/DL (ref 3.2–4.9)
ALBUMIN SERPL BCP-MCNC: 3.8 G/DL (ref 3.2–4.9)
ALBUMIN/GLOB SERPL: 1.5 G/DL
ALBUMIN/GLOB SERPL: 1.5 G/DL
ALBUMIN/GLOB SERPL: 1.8 G/DL
ALP SERPL-CCNC: 89 U/L (ref 30–99)
ALP SERPL-CCNC: 90 U/L (ref 30–99)
ALP SERPL-CCNC: 94 U/L (ref 30–99)
ALT SERPL-CCNC: 14 U/L (ref 2–50)
ALT SERPL-CCNC: 16 U/L (ref 2–50)
ALT SERPL-CCNC: 16 U/L (ref 2–50)
ANION GAP SERPL CALC-SCNC: 10 MMOL/L (ref 7–16)
ANION GAP SERPL CALC-SCNC: 11 MMOL/L (ref 7–16)
ANION GAP SERPL CALC-SCNC: 11 MMOL/L (ref 7–16)
ANION GAP SERPL CALC-SCNC: 12 MMOL/L (ref 7–16)
ANION GAP SERPL CALC-SCNC: 14 MMOL/L (ref 7–16)
ANION GAP SERPL CALC-SCNC: 8 MMOL/L (ref 7–16)
ANION GAP SERPL CALC-SCNC: 8 MMOL/L (ref 7–16)
ANION GAP SERPL CALC-SCNC: 9 MMOL/L (ref 7–16)
ANION GAP SERPL CALC-SCNC: 9 MMOL/L (ref 7–16)
APTT PPP: 28.9 SEC (ref 24.7–36)
AST SERPL-CCNC: 16 U/L (ref 12–45)
AST SERPL-CCNC: 18 U/L (ref 12–45)
AST SERPL-CCNC: 19 U/L (ref 12–45)
BASOPHILS # BLD AUTO: 0.5 % (ref 0–1.8)
BASOPHILS # BLD: 0.04 K/UL (ref 0–0.12)
BILIRUB SERPL-MCNC: 0.6 MG/DL (ref 0.1–1.5)
BILIRUB SERPL-MCNC: 0.9 MG/DL (ref 0.1–1.5)
BILIRUB SERPL-MCNC: 1 MG/DL (ref 0.1–1.5)
BLD GP AB SCN SERPL QL: NORMAL
BUN SERPL-MCNC: 18 MG/DL (ref 8–22)
BUN SERPL-MCNC: 19 MG/DL (ref 8–22)
BUN SERPL-MCNC: 20 MG/DL (ref 8–22)
BUN SERPL-MCNC: 21 MG/DL (ref 8–22)
BUN SERPL-MCNC: 22 MG/DL (ref 8–22)
BUN SERPL-MCNC: 25 MG/DL (ref 8–22)
BUN SERPL-MCNC: 25 MG/DL (ref 8–22)
CA-I SERPL-SCNC: 1.1 MMOL/L (ref 1.1–1.3)
CALCIUM SERPL-MCNC: 5.9 MG/DL (ref 8.5–10.5)
CALCIUM SERPL-MCNC: 7.8 MG/DL (ref 8.5–10.5)
CALCIUM SERPL-MCNC: 7.8 MG/DL (ref 8.5–10.5)
CALCIUM SERPL-MCNC: 7.9 MG/DL (ref 8.5–10.5)
CALCIUM SERPL-MCNC: 8.3 MG/DL (ref 8.5–10.5)
CALCIUM SERPL-MCNC: 8.3 MG/DL (ref 8.5–10.5)
CALCIUM SERPL-MCNC: 8.4 MG/DL (ref 8.5–10.5)
CALCIUM SERPL-MCNC: 8.6 MG/DL (ref 8.5–10.5)
CALCIUM SERPL-MCNC: 9.3 MG/DL (ref 8.4–10.2)
CHLORIDE SERPL-SCNC: 103 MMOL/L (ref 96–112)
CHLORIDE SERPL-SCNC: 104 MMOL/L (ref 96–112)
CHLORIDE SERPL-SCNC: 105 MMOL/L (ref 96–112)
CHLORIDE SERPL-SCNC: 105 MMOL/L (ref 96–112)
CHLORIDE SERPL-SCNC: 106 MMOL/L (ref 96–112)
CHLORIDE SERPL-SCNC: 108 MMOL/L (ref 96–112)
CHLORIDE SERPL-SCNC: 108 MMOL/L (ref 96–112)
CHLORIDE SERPL-SCNC: 109 MMOL/L (ref 96–112)
CHLORIDE SERPL-SCNC: 113 MMOL/L (ref 96–112)
CHOLEST SERPL-MCNC: 195 MG/DL (ref 100–199)
CO2 SERPL-SCNC: 19 MMOL/L (ref 20–33)
CO2 SERPL-SCNC: 22 MMOL/L (ref 20–33)
CO2 SERPL-SCNC: 24 MMOL/L (ref 20–33)
CO2 SERPL-SCNC: 24 MMOL/L (ref 20–33)
CO2 SERPL-SCNC: 25 MMOL/L (ref 20–33)
CO2 SERPL-SCNC: 26 MMOL/L (ref 20–33)
CO2 SERPL-SCNC: 28 MMOL/L (ref 20–33)
COVID ORDER STATUS COVID19: NORMAL
CREAT SERPL-MCNC: 0.5 MG/DL (ref 0.5–1.4)
CREAT SERPL-MCNC: 0.63 MG/DL (ref 0.5–1.4)
CREAT SERPL-MCNC: 0.71 MG/DL (ref 0.5–1.4)
CREAT SERPL-MCNC: 0.77 MG/DL (ref 0.5–1.4)
CREAT SERPL-MCNC: 0.81 MG/DL (ref 0.5–1.4)
CREAT SERPL-MCNC: 0.87 MG/DL (ref 0.5–1.4)
CREAT SERPL-MCNC: 0.91 MG/DL (ref 0.5–1.4)
CREAT SERPL-MCNC: 0.94 MG/DL (ref 0.5–1.4)
CREAT SERPL-MCNC: 0.95 MG/DL (ref 0.5–1.4)
CRP SERPL HS-MCNC: 10.31 MG/DL (ref 0–0.75)
CRP SERPL HS-MCNC: 2.47 MG/DL (ref 0–0.75)
EKG IMPRESSION: NORMAL
EOSINOPHIL # BLD AUTO: 0.05 K/UL (ref 0–0.51)
EOSINOPHIL NFR BLD: 0.7 % (ref 0–6.9)
ERYTHROCYTE [DISTWIDTH] IN BLOOD BY AUTOMATED COUNT: 49.1 FL (ref 35.9–50)
ERYTHROCYTE [DISTWIDTH] IN BLOOD BY AUTOMATED COUNT: 49.8 FL (ref 35.9–50)
ERYTHROCYTE [DISTWIDTH] IN BLOOD BY AUTOMATED COUNT: 50.1 FL (ref 35.9–50)
ERYTHROCYTE [DISTWIDTH] IN BLOOD BY AUTOMATED COUNT: 50.9 FL (ref 35.9–50)
ERYTHROCYTE [DISTWIDTH] IN BLOOD BY AUTOMATED COUNT: 51.9 FL (ref 35.9–50)
ERYTHROCYTE [DISTWIDTH] IN BLOOD BY AUTOMATED COUNT: 51.9 FL (ref 35.9–50)
ERYTHROCYTE [DISTWIDTH] IN BLOOD BY AUTOMATED COUNT: 52.5 FL (ref 35.9–50)
EST. AVERAGE GLUCOSE BLD GHB EST-MCNC: 114 MG/DL
GFR SERPLBLD CREATININE-BSD FMLA CKD-EPI: 101 ML/MIN/1.73 M 2
GFR SERPLBLD CREATININE-BSD FMLA CKD-EPI: 79 ML/MIN/1.73 M 2
GFR SERPLBLD CREATININE-BSD FMLA CKD-EPI: 80 ML/MIN/1.73 M 2
GFR SERPLBLD CREATININE-BSD FMLA CKD-EPI: 85 ML/MIN/1.73 M 2
GFR SERPLBLD CREATININE-BSD FMLA CKD-EPI: 87 ML/MIN/1.73 M 2
GFR SERPLBLD CREATININE-BSD FMLA CKD-EPI: 89 ML/MIN/1.73 M 2
GFR SERPLBLD CREATININE-BSD FMLA CKD-EPI: 91 ML/MIN/1.73 M 2
GFR SERPLBLD CREATININE-BSD FMLA CKD-EPI: 94 ML/MIN/1.73 M 2
GLOBULIN SER CALC-MCNC: 2.1 G/DL (ref 1.9–3.5)
GLOBULIN SER CALC-MCNC: 2.5 G/DL (ref 1.9–3.5)
GLOBULIN SER CALC-MCNC: 2.6 G/DL (ref 1.9–3.5)
GLUCOSE BLD STRIP.AUTO-MCNC: 102 MG/DL (ref 65–99)
GLUCOSE BLD STRIP.AUTO-MCNC: 103 MG/DL (ref 65–99)
GLUCOSE BLD STRIP.AUTO-MCNC: 104 MG/DL (ref 65–99)
GLUCOSE BLD STRIP.AUTO-MCNC: 104 MG/DL (ref 65–99)
GLUCOSE BLD STRIP.AUTO-MCNC: 105 MG/DL (ref 65–99)
GLUCOSE BLD STRIP.AUTO-MCNC: 106 MG/DL (ref 65–99)
GLUCOSE BLD STRIP.AUTO-MCNC: 107 MG/DL (ref 65–99)
GLUCOSE BLD STRIP.AUTO-MCNC: 108 MG/DL (ref 65–99)
GLUCOSE BLD STRIP.AUTO-MCNC: 111 MG/DL (ref 65–99)
GLUCOSE BLD STRIP.AUTO-MCNC: 114 MG/DL (ref 65–99)
GLUCOSE BLD STRIP.AUTO-MCNC: 124 MG/DL (ref 65–99)
GLUCOSE BLD STRIP.AUTO-MCNC: 128 MG/DL (ref 65–99)
GLUCOSE BLD STRIP.AUTO-MCNC: 129 MG/DL (ref 65–99)
GLUCOSE BLD STRIP.AUTO-MCNC: 131 MG/DL (ref 65–99)
GLUCOSE BLD STRIP.AUTO-MCNC: 149 MG/DL (ref 65–99)
GLUCOSE BLD STRIP.AUTO-MCNC: 153 MG/DL (ref 65–99)
GLUCOSE BLD STRIP.AUTO-MCNC: 90 MG/DL (ref 65–99)
GLUCOSE BLD STRIP.AUTO-MCNC: 93 MG/DL (ref 65–99)
GLUCOSE BLD STRIP.AUTO-MCNC: 94 MG/DL (ref 65–99)
GLUCOSE BLD STRIP.AUTO-MCNC: 95 MG/DL (ref 65–99)
GLUCOSE BLD STRIP.AUTO-MCNC: 98 MG/DL (ref 65–99)
GLUCOSE SERPL-MCNC: 100 MG/DL (ref 65–99)
GLUCOSE SERPL-MCNC: 104 MG/DL (ref 65–99)
GLUCOSE SERPL-MCNC: 109 MG/DL (ref 65–99)
GLUCOSE SERPL-MCNC: 113 MG/DL (ref 65–99)
GLUCOSE SERPL-MCNC: 115 MG/DL (ref 65–99)
GLUCOSE SERPL-MCNC: 118 MG/DL (ref 65–99)
GLUCOSE SERPL-MCNC: 154 MG/DL (ref 65–99)
GLUCOSE SERPL-MCNC: 95 MG/DL (ref 65–99)
GLUCOSE SERPL-MCNC: 95 MG/DL (ref 65–99)
HBA1C MFR BLD: 5.6 % (ref 4–5.6)
HCT VFR BLD AUTO: 41.6 % (ref 42–52)
HCT VFR BLD AUTO: 44.3 % (ref 42–52)
HCT VFR BLD AUTO: 45.5 % (ref 42–52)
HCT VFR BLD AUTO: 48.9 % (ref 42–52)
HCT VFR BLD AUTO: 49.8 % (ref 42–52)
HCT VFR BLD AUTO: 50.9 % (ref 42–52)
HCT VFR BLD AUTO: 52.7 % (ref 42–52)
HDLC SERPL-MCNC: 59 MG/DL
HGB BLD-MCNC: 13.7 G/DL (ref 14–18)
HGB BLD-MCNC: 14.6 G/DL (ref 14–18)
HGB BLD-MCNC: 14.7 G/DL (ref 14–18)
HGB BLD-MCNC: 16.4 G/DL (ref 14–18)
HGB BLD-MCNC: 16.9 G/DL (ref 14–18)
HGB BLD-MCNC: 16.9 G/DL (ref 14–18)
HGB BLD-MCNC: 17.4 G/DL (ref 14–18)
IMM GRANULOCYTES # BLD AUTO: 0.02 K/UL (ref 0–0.11)
IMM GRANULOCYTES NFR BLD AUTO: 0.3 % (ref 0–0.9)
INR PPP: 1.03 (ref 0.87–1.13)
LDLC SERPL CALC-MCNC: 127 MG/DL
LYMPHOCYTES # BLD AUTO: 1.19 K/UL (ref 1–4.8)
LYMPHOCYTES NFR BLD: 16.3 % (ref 22–41)
MAGNESIUM SERPL-MCNC: 1.6 MG/DL (ref 1.5–2.5)
MAGNESIUM SERPL-MCNC: 1.8 MG/DL (ref 1.5–2.5)
MAGNESIUM SERPL-MCNC: 2.1 MG/DL (ref 1.5–2.5)
MAGNESIUM SERPL-MCNC: 2.3 MG/DL (ref 1.5–2.5)
MCH RBC QN AUTO: 31.2 PG (ref 27–33)
MCH RBC QN AUTO: 31.5 PG (ref 27–33)
MCH RBC QN AUTO: 31.8 PG (ref 27–33)
MCH RBC QN AUTO: 31.8 PG (ref 27–33)
MCH RBC QN AUTO: 32.1 PG (ref 27–33)
MCH RBC QN AUTO: 32.2 PG (ref 27–33)
MCH RBC QN AUTO: 32.2 PG (ref 27–33)
MCHC RBC AUTO-ENTMCNC: 32.3 G/DL (ref 33.7–35.3)
MCHC RBC AUTO-ENTMCNC: 32.9 G/DL (ref 33.7–35.3)
MCHC RBC AUTO-ENTMCNC: 33 G/DL (ref 33.7–35.3)
MCHC RBC AUTO-ENTMCNC: 33 G/DL (ref 33.7–35.3)
MCHC RBC AUTO-ENTMCNC: 33.2 G/DL (ref 33.7–35.3)
MCHC RBC AUTO-ENTMCNC: 33.5 G/DL (ref 33.7–35.3)
MCHC RBC AUTO-ENTMCNC: 33.9 G/DL (ref 33.7–35.3)
MCV RBC AUTO: 93.8 FL (ref 81.4–97.8)
MCV RBC AUTO: 94.8 FL (ref 81.4–97.8)
MCV RBC AUTO: 95 FL (ref 81.4–97.8)
MCV RBC AUTO: 96.6 FL (ref 81.4–97.8)
MCV RBC AUTO: 97.2 FL (ref 81.4–97.8)
MCV RBC AUTO: 97.6 FL (ref 81.4–97.8)
MCV RBC AUTO: 97.7 FL (ref 81.4–97.8)
MONOCYTES # BLD AUTO: 0.51 K/UL (ref 0–0.85)
MONOCYTES NFR BLD AUTO: 7 % (ref 0–13.4)
NEUTROPHILS # BLD AUTO: 5.5 K/UL (ref 1.82–7.42)
NEUTROPHILS NFR BLD: 75.2 % (ref 44–72)
NRBC # BLD AUTO: 0 K/UL
NRBC BLD-RTO: 0 /100 WBC
PHOSPHATE SERPL-MCNC: 2.7 MG/DL (ref 2.5–4.5)
PHOSPHATE SERPL-MCNC: 3 MG/DL (ref 2.5–4.5)
PHOSPHATE SERPL-MCNC: 3.1 MG/DL (ref 2.5–4.5)
PHOSPHATE SERPL-MCNC: 3.8 MG/DL (ref 2.5–4.5)
PLATELET # BLD AUTO: 147 K/UL (ref 164–446)
PLATELET # BLD AUTO: 161 K/UL (ref 164–446)
PLATELET # BLD AUTO: 161 K/UL (ref 164–446)
PLATELET # BLD AUTO: 166 K/UL (ref 164–446)
PLATELET # BLD AUTO: 191 K/UL (ref 164–446)
PLATELET # BLD AUTO: 193 K/UL (ref 164–446)
PLATELET # BLD AUTO: 201 K/UL (ref 164–446)
PMV BLD AUTO: 10.9 FL (ref 9–12.9)
PMV BLD AUTO: 10.9 FL (ref 9–12.9)
PMV BLD AUTO: 11 FL (ref 9–12.9)
PMV BLD AUTO: 11.1 FL (ref 9–12.9)
PMV BLD AUTO: 11.2 FL (ref 9–12.9)
PMV BLD AUTO: 11.4 FL (ref 9–12.9)
PMV BLD AUTO: 11.8 FL (ref 9–12.9)
POTASSIUM SERPL-SCNC: 3.3 MMOL/L (ref 3.6–5.5)
POTASSIUM SERPL-SCNC: 3.8 MMOL/L (ref 3.6–5.5)
POTASSIUM SERPL-SCNC: 3.9 MMOL/L (ref 3.6–5.5)
POTASSIUM SERPL-SCNC: 4 MMOL/L (ref 3.6–5.5)
POTASSIUM SERPL-SCNC: 4.1 MMOL/L (ref 3.6–5.5)
POTASSIUM SERPL-SCNC: 4.1 MMOL/L (ref 3.6–5.5)
POTASSIUM SERPL-SCNC: 4.2 MMOL/L (ref 3.6–5.5)
POTASSIUM SERPL-SCNC: 4.2 MMOL/L (ref 3.6–5.5)
POTASSIUM SERPL-SCNC: 4.4 MMOL/L (ref 3.6–5.5)
PREALB SERPL-MCNC: 11.1 MG/DL (ref 18–38)
PREALB SERPL-MCNC: 12.4 MG/DL (ref 18–38)
PROT SERPL-MCNC: 5.9 G/DL (ref 6–8.2)
PROT SERPL-MCNC: 6.2 G/DL (ref 6–8.2)
PROT SERPL-MCNC: 6.4 G/DL (ref 6–8.2)
PROTHROMBIN TIME: 13.4 SEC (ref 12–14.6)
PTH-INTACT SERPL-MCNC: 46.3 PG/ML (ref 14–72)
RBC # BLD AUTO: 4.26 M/UL (ref 4.7–6.1)
RBC # BLD AUTO: 4.54 M/UL (ref 4.7–6.1)
RBC # BLD AUTO: 4.71 M/UL (ref 4.7–6.1)
RBC # BLD AUTO: 5.16 M/UL (ref 4.7–6.1)
RBC # BLD AUTO: 5.31 M/UL (ref 4.7–6.1)
RBC # BLD AUTO: 5.36 M/UL (ref 4.7–6.1)
RBC # BLD AUTO: 5.42 M/UL (ref 4.7–6.1)
RH BLD: NORMAL
SARS-COV-2 RNA RESP QL NAA+PROBE: NOTDETECTED
SARS-COV-2 RNA RESP QL NAA+PROBE: NOTDETECTED
SODIUM SERPL-SCNC: 137 MMOL/L (ref 135–145)
SODIUM SERPL-SCNC: 138 MMOL/L (ref 135–145)
SODIUM SERPL-SCNC: 140 MMOL/L (ref 135–145)
SODIUM SERPL-SCNC: 142 MMOL/L (ref 135–145)
SODIUM SERPL-SCNC: 143 MMOL/L (ref 135–145)
SODIUM SERPL-SCNC: 143 MMOL/L (ref 135–145)
SODIUM SERPL-SCNC: 144 MMOL/L (ref 135–145)
SPECIMEN SOURCE: NORMAL
SPECIMEN SOURCE: NORMAL
TRIGL SERPL-MCNC: 45 MG/DL (ref 0–149)
TROPONIN T SERPL-MCNC: 16 NG/L (ref 6–19)
WBC # BLD AUTO: 10.7 K/UL (ref 4.8–10.8)
WBC # BLD AUTO: 11.9 K/UL (ref 4.8–10.8)
WBC # BLD AUTO: 11.9 K/UL (ref 4.8–10.8)
WBC # BLD AUTO: 7.3 K/UL (ref 4.8–10.8)
WBC # BLD AUTO: 8.1 K/UL (ref 4.8–10.8)
WBC # BLD AUTO: 8.2 K/UL (ref 4.8–10.8)
WBC # BLD AUTO: 9 K/UL (ref 4.8–10.8)

## 2022-01-01 PROCEDURE — 700101 HCHG RX REV CODE 250: Performed by: NURSE PRACTITIONER

## 2022-01-01 PROCEDURE — 37184 PRIM ART M-THRMBC 1ST VSL: CPT

## 2022-01-01 PROCEDURE — 86850 RBC ANTIBODY SCREEN: CPT

## 2022-01-01 PROCEDURE — 92526 ORAL FUNCTION THERAPY: CPT

## 2022-01-01 PROCEDURE — C1781 MESH (IMPLANTABLE): HCPCS | Performed by: SURGERY

## 2022-01-01 PROCEDURE — 99100 ANES PT EXTEME AGE<1 YR&>70: CPT | Performed by: ANESTHESIOLOGY

## 2022-01-01 PROCEDURE — 99291 CRITICAL CARE FIRST HOUR: CPT

## 2022-01-01 PROCEDURE — 86900 BLOOD TYPING SEROLOGIC ABO: CPT

## 2022-01-01 PROCEDURE — 00350 ANES PX MAJOR VSL NECK NOS: CPT | Performed by: ANESTHESIOLOGY

## 2022-01-01 PROCEDURE — 94726 PLETHYSMOGRAPHY LUNG VOLUMES: CPT | Performed by: INTERNAL MEDICINE

## 2022-01-01 PROCEDURE — 0042T CT-CEREBRAL PERFUSION ANALYSIS: CPT

## 2022-01-01 PROCEDURE — 501664 HCHG TUBING, FILTER STRYKER: Performed by: SURGERY

## 2022-01-01 PROCEDURE — 83036 HEMOGLOBIN GLYCOSYLATED A1C: CPT

## 2022-01-01 PROCEDURE — 700101 HCHG RX REV CODE 250: Performed by: STUDENT IN AN ORGANIZED HEALTH CARE EDUCATION/TRAINING PROGRAM

## 2022-01-01 PROCEDURE — 92612 ENDOSCOPY SWALLOW (FEES) VID: CPT

## 2022-01-01 PROCEDURE — 160041 HCHG SURGERY MINUTES - EA ADDL 1 MIN LEVEL 4: Performed by: SURGERY

## 2022-01-01 PROCEDURE — 80048 BASIC METABOLIC PNL TOTAL CA: CPT

## 2022-01-01 PROCEDURE — 85347 COAGULATION TIME ACTIVATED: CPT

## 2022-01-01 PROCEDURE — 03JY3ZZ INSPECTION OF UPPER ARTERY, PERCUTANEOUS APPROACH: ICD-10-PCS | Performed by: SURGERY

## 2022-01-01 PROCEDURE — 700111 HCHG RX REV CODE 636 W/ 250 OVERRIDE (IP)

## 2022-01-01 PROCEDURE — 700101 HCHG RX REV CODE 250: Performed by: INTERNAL MEDICINE

## 2022-01-01 PROCEDURE — 700102 HCHG RX REV CODE 250 W/ 637 OVERRIDE(OP): Performed by: INTERNAL MEDICINE

## 2022-01-01 PROCEDURE — 70450 CT HEAD/BRAIN W/O DYE: CPT | Mod: MG

## 2022-01-01 PROCEDURE — 160028 HCHG SURGERY MINUTES - 1ST 30 MINS LEVEL 3: Performed by: SURGERY

## 2022-01-01 PROCEDURE — 36415 COLL VENOUS BLD VENIPUNCTURE: CPT

## 2022-01-01 PROCEDURE — 84134 ASSAY OF PREALBUMIN: CPT

## 2022-01-01 PROCEDURE — 770020 HCHG ROOM/CARE - TELE (206)

## 2022-01-01 PROCEDURE — A9270 NON-COVERED ITEM OR SERVICE: HCPCS | Performed by: INTERNAL MEDICINE

## 2022-01-01 PROCEDURE — 700101 HCHG RX REV CODE 250: Performed by: RADIOLOGY

## 2022-01-01 PROCEDURE — 700111 HCHG RX REV CODE 636 W/ 250 OVERRIDE (IP): Performed by: INTERNAL MEDICINE

## 2022-01-01 PROCEDURE — 770001 HCHG ROOM/CARE - MED/SURG/GYN PRIV*

## 2022-01-01 PROCEDURE — 74018 RADEX ABDOMEN 1 VIEW: CPT

## 2022-01-01 PROCEDURE — 84100 ASSAY OF PHOSPHORUS: CPT

## 2022-01-01 PROCEDURE — 700102 HCHG RX REV CODE 250 W/ 637 OVERRIDE(OP): Performed by: SURGERY

## 2022-01-01 PROCEDURE — 160039 HCHG SURGERY MINUTES - EA ADDL 1 MIN LEVEL 3: Performed by: SURGERY

## 2022-01-01 PROCEDURE — 99233 SBSQ HOSP IP/OBS HIGH 50: CPT | Performed by: NURSE PRACTITIONER

## 2022-01-01 PROCEDURE — 700117 HCHG RX CONTRAST REV CODE 255: Performed by: NURSE PRACTITIONER

## 2022-01-01 PROCEDURE — 700105 HCHG RX REV CODE 258: Performed by: HOSPITALIST

## 2022-01-01 PROCEDURE — 70498 CT ANGIOGRAPHY NECK: CPT | Mod: MG

## 2022-01-01 PROCEDURE — G0378 HOSPITAL OBSERVATION PER HR: HCPCS

## 2022-01-01 PROCEDURE — 70496 CT ANGIOGRAPHY HEAD: CPT | Mod: MG

## 2022-01-01 PROCEDURE — 700111 HCHG RX REV CODE 636 W/ 250 OVERRIDE (IP): Performed by: SURGERY

## 2022-01-01 PROCEDURE — 700111 HCHG RX REV CODE 636 W/ 250 OVERRIDE (IP): Performed by: ANESTHESIOLOGY

## 2022-01-01 PROCEDURE — 770022 HCHG ROOM/CARE - ICU (200)

## 2022-01-01 PROCEDURE — 700101 HCHG RX REV CODE 250: Performed by: SURGERY

## 2022-01-01 PROCEDURE — A9270 NON-COVERED ITEM OR SERVICE: HCPCS | Performed by: SURGERY

## 2022-01-01 PROCEDURE — 76000 FLUOROSCOPY <1 HR PHYS/QHP: CPT

## 2022-01-01 PROCEDURE — 99231 SBSQ HOSP IP/OBS SF/LOW 25: CPT | Performed by: INTERNAL MEDICINE

## 2022-01-01 PROCEDURE — 99140 ANES COMP EMERGENCY COND: CPT | Performed by: ANESTHESIOLOGY

## 2022-01-01 PROCEDURE — 99223 1ST HOSP IP/OBS HIGH 75: CPT | Performed by: PHYSICAL MEDICINE & REHABILITATION

## 2022-01-01 PROCEDURE — 80053 COMPREHEN METABOLIC PANEL: CPT

## 2022-01-01 PROCEDURE — 700105 HCHG RX REV CODE 258: Performed by: ANESTHESIOLOGY

## 2022-01-01 PROCEDURE — 99213 OFFICE O/P EST LOW 20 MIN: CPT | Performed by: FAMILY MEDICINE

## 2022-01-01 PROCEDURE — 99497 ADVNCD CARE PLAN 30 MIN: CPT | Performed by: INTERNAL MEDICINE

## 2022-01-01 PROCEDURE — 160048 HCHG OR STATISTICAL LEVEL 1-5: Performed by: SURGERY

## 2022-01-01 PROCEDURE — 85027 COMPLETE CBC AUTOMATED: CPT

## 2022-01-01 PROCEDURE — 160035 HCHG PACU - 1ST 60 MINS PHASE I: Performed by: SURGERY

## 2022-01-01 PROCEDURE — 71046 X-RAY EXAM CHEST 2 VIEWS: CPT

## 2022-01-01 PROCEDURE — 99222 1ST HOSP IP/OBS MODERATE 55: CPT | Mod: AI | Performed by: HOSPITALIST

## 2022-01-01 PROCEDURE — 82962 GLUCOSE BLOOD TEST: CPT | Mod: 91

## 2022-01-01 PROCEDURE — 0JB40ZZ EXCISION OF RIGHT NECK SUBCUTANEOUS TISSUE AND FASCIA, OPEN APPROACH: ICD-10-PCS | Performed by: SURGERY

## 2022-01-01 PROCEDURE — 94760 N-INVAS EAR/PLS OXIMETRY 1: CPT

## 2022-01-01 PROCEDURE — 99232 SBSQ HOSP IP/OBS MODERATE 35: CPT | Performed by: HOSPITALIST

## 2022-01-01 PROCEDURE — U0005 INFEC AGEN DETEC AMPLI PROBE: HCPCS

## 2022-01-01 PROCEDURE — 160002 HCHG RECOVERY MINUTES (STAT): Performed by: SURGERY

## 2022-01-01 PROCEDURE — 99221 1ST HOSP IP/OBS SF/LOW 40: CPT | Mod: 57 | Performed by: SURGERY

## 2022-01-01 PROCEDURE — 86901 BLOOD TYPING SEROLOGIC RH(D): CPT

## 2022-01-01 PROCEDURE — 85610 PROTHROMBIN TIME: CPT

## 2022-01-01 PROCEDURE — 99232 SBSQ HOSP IP/OBS MODERATE 35: CPT | Performed by: INTERNAL MEDICINE

## 2022-01-01 PROCEDURE — 86140 C-REACTIVE PROTEIN: CPT

## 2022-01-01 PROCEDURE — 160009 HCHG ANES TIME/MIN: Performed by: SURGERY

## 2022-01-01 PROCEDURE — 700102 HCHG RX REV CODE 250 W/ 637 OVERRIDE(OP): Performed by: STUDENT IN AN ORGANIZED HEALTH CARE EDUCATION/TRAINING PROGRAM

## 2022-01-01 PROCEDURE — 700101 HCHG RX REV CODE 250: Performed by: ANESTHESIOLOGY

## 2022-01-01 PROCEDURE — U0003 INFECTIOUS AGENT DETECTION BY NUCLEIC ACID (DNA OR RNA); SEVERE ACUTE RESPIRATORY SYNDROME CORONAVIRUS 2 (SARS-COV-2) (CORONAVIRUS DISEASE [COVID-19]), AMPLIFIED PROBE TECHNIQUE, MAKING USE OF HIGH THROUGHPUT TECHNOLOGIES AS DESCRIBED BY CMS-2020-01-R: HCPCS

## 2022-01-01 PROCEDURE — 82962 GLUCOSE BLOOD TEST: CPT

## 2022-01-01 PROCEDURE — 99214 OFFICE O/P EST MOD 30 MIN: CPT | Performed by: INTERNAL MEDICINE

## 2022-01-01 PROCEDURE — 93010 ELECTROCARDIOGRAM REPORT: CPT | Performed by: STUDENT IN AN ORGANIZED HEALTH CARE EDUCATION/TRAINING PROGRAM

## 2022-01-01 PROCEDURE — 97530 THERAPEUTIC ACTIVITIES: CPT

## 2022-01-01 PROCEDURE — 85730 THROMBOPLASTIN TIME PARTIAL: CPT

## 2022-01-01 PROCEDURE — C1725 CATH, TRANSLUMIN NON-LASER: HCPCS | Performed by: SURGERY

## 2022-01-01 PROCEDURE — 93005 ELECTROCARDIOGRAM TRACING: CPT

## 2022-01-01 PROCEDURE — 94060 EVALUATION OF WHEEZING: CPT | Performed by: INTERNAL MEDICINE

## 2022-01-01 PROCEDURE — 99233 SBSQ HOSP IP/OBS HIGH 50: CPT | Mod: 25 | Performed by: INTERNAL MEDICINE

## 2022-01-01 PROCEDURE — 99233 SBSQ HOSP IP/OBS HIGH 50: CPT | Performed by: INTERNAL MEDICINE

## 2022-01-01 PROCEDURE — 83735 ASSAY OF MAGNESIUM: CPT

## 2022-01-01 PROCEDURE — 700102 HCHG RX REV CODE 250 W/ 637 OVERRIDE(OP): Performed by: ANESTHESIOLOGY

## 2022-01-01 PROCEDURE — 99233 SBSQ HOSP IP/OBS HIGH 50: CPT | Performed by: PHYSICAL MEDICINE & REHABILITATION

## 2022-01-01 PROCEDURE — 700105 HCHG RX REV CODE 258: Performed by: SURGERY

## 2022-01-01 PROCEDURE — 35301 RECHANNELING OF ARTERY: CPT | Mod: RT | Performed by: SURGERY

## 2022-01-01 PROCEDURE — 71045 X-RAY EXAM CHEST 1 VIEW: CPT

## 2022-01-01 PROCEDURE — 700117 HCHG RX CONTRAST REV CODE 255: Performed by: EMERGENCY MEDICINE

## 2022-01-01 PROCEDURE — 99223 1ST HOSP IP/OBS HIGH 75: CPT | Mod: FS | Performed by: NURSE PRACTITIONER

## 2022-01-01 PROCEDURE — 99291 CRITICAL CARE FIRST HOUR: CPT | Performed by: STUDENT IN AN ORGANIZED HEALTH CARE EDUCATION/TRAINING PROGRAM

## 2022-01-01 PROCEDURE — 160036 HCHG PACU - EA ADDL 30 MINS PHASE I: Performed by: SURGERY

## 2022-01-01 PROCEDURE — 97163 PT EVAL HIGH COMPLEX 45 MIN: CPT

## 2022-01-01 PROCEDURE — 500868 HCHG NEEDLE, SURGI(VARES): Performed by: SURGERY

## 2022-01-01 PROCEDURE — 110454 HCHG SHELL REV 250: Performed by: SURGERY

## 2022-01-01 PROCEDURE — 82330 ASSAY OF CALCIUM: CPT

## 2022-01-01 PROCEDURE — 96374 THER/PROPH/DIAG INJ IV PUSH: CPT

## 2022-01-01 PROCEDURE — 84484 ASSAY OF TROPONIN QUANT: CPT

## 2022-01-01 PROCEDURE — 93010 ELECTROCARDIOGRAM REPORT: CPT | Performed by: INTERNAL MEDICINE

## 2022-01-01 PROCEDURE — 03CG3ZZ EXTIRPATION OF MATTER FROM INTRACRANIAL ARTERY, PERCUTANEOUS APPROACH: ICD-10-PCS | Performed by: RADIOLOGY

## 2022-01-01 PROCEDURE — 92610 EVALUATE SWALLOWING FUNCTION: CPT

## 2022-01-01 PROCEDURE — 700111 HCHG RX REV CODE 636 W/ 250 OVERRIDE (IP): Performed by: RADIOLOGY

## 2022-01-01 PROCEDURE — 94729 DIFFUSING CAPACITY: CPT | Performed by: INTERNAL MEDICINE

## 2022-01-01 PROCEDURE — 502714 HCHG ROBOTIC SURGERY SERVICES: Performed by: SURGERY

## 2022-01-01 PROCEDURE — 80061 LIPID PANEL: CPT

## 2022-01-01 PROCEDURE — 97167 OT EVAL HIGH COMPLEX 60 MIN: CPT

## 2022-01-01 PROCEDURE — 110371 HCHG SHELL REV 272: Performed by: SURGERY

## 2022-01-01 PROCEDURE — 160029 HCHG SURGERY MINUTES - 1ST 30 MINS LEVEL 4: Performed by: SURGERY

## 2022-01-01 PROCEDURE — 85025 COMPLETE CBC W/AUTO DIFF WBC: CPT

## 2022-01-01 PROCEDURE — 99232 SBSQ HOSP IP/OBS MODERATE 35: CPT | Mod: 25 | Performed by: INTERNAL MEDICINE

## 2022-01-01 PROCEDURE — 93005 ELECTROCARDIOGRAM TRACING: CPT | Performed by: EMERGENCY MEDICINE

## 2022-01-01 PROCEDURE — 501838 HCHG SUTURE GENERAL: Performed by: SURGERY

## 2022-01-01 PROCEDURE — 99214 OFFICE O/P EST MOD 30 MIN: CPT | Performed by: PREVENTIVE MEDICINE

## 2022-01-01 PROCEDURE — A9540 TC99M MAA: HCPCS

## 2022-01-01 PROCEDURE — 83970 ASSAY OF PARATHORMONE: CPT

## 2022-01-01 PROCEDURE — C9803 HOPD COVID-19 SPEC COLLECT: HCPCS

## 2022-01-01 PROCEDURE — A9270 NON-COVERED ITEM OR SERVICE: HCPCS | Performed by: ANESTHESIOLOGY

## 2022-01-01 PROCEDURE — 99232 SBSQ HOSP IP/OBS MODERATE 35: CPT | Performed by: NURSE PRACTITIONER

## 2022-01-01 DEVICE — MESH PROGRIP LAPROSCOPIC SELF FIXATING (1/CA): Type: IMPLANTABLE DEVICE | Site: ABDOMEN | Status: FUNCTIONAL

## 2022-01-01 RX ORDER — MIDAZOLAM HYDROCHLORIDE 1 MG/ML
1 INJECTION INTRAMUSCULAR; INTRAVENOUS
Status: DISCONTINUED | OUTPATIENT
Start: 2022-01-01 | End: 2022-01-01 | Stop reason: HOSPADM

## 2022-01-01 RX ORDER — OXYCODONE HCL 20 MG/ML
5-10 CONCENTRATE, ORAL ORAL
Status: DISCONTINUED | OUTPATIENT
Start: 2022-01-01 | End: 2022-01-01

## 2022-01-01 RX ORDER — ONDANSETRON 2 MG/ML
INJECTION INTRAMUSCULAR; INTRAVENOUS PRN
Status: DISCONTINUED | OUTPATIENT
Start: 2022-01-01 | End: 2022-01-01 | Stop reason: SURG

## 2022-01-01 RX ORDER — HYDROMORPHONE HYDROCHLORIDE 1 MG/ML
0.5 INJECTION, SOLUTION INTRAMUSCULAR; INTRAVENOUS; SUBCUTANEOUS
Status: DISCONTINUED | OUTPATIENT
Start: 2022-01-01 | End: 2022-01-01 | Stop reason: HOSPADM

## 2022-01-01 RX ORDER — ACETAMINOPHEN 500 MG
1000 TABLET ORAL ONCE
Status: COMPLETED | OUTPATIENT
Start: 2022-01-01 | End: 2022-01-01

## 2022-01-01 RX ORDER — OXYCODONE HYDROCHLORIDE 5 MG/1
5 TABLET ORAL
Status: DISCONTINUED | OUTPATIENT
Start: 2022-01-01 | End: 2022-01-01

## 2022-01-01 RX ORDER — OXYCODONE HCL 5 MG/5 ML
5 SOLUTION, ORAL ORAL
Status: DISCONTINUED | OUTPATIENT
Start: 2022-01-01 | End: 2022-01-01 | Stop reason: HOSPADM

## 2022-01-01 RX ORDER — FUROSEMIDE 40 MG/1
40 TABLET ORAL DAILY
Status: DISCONTINUED | OUTPATIENT
Start: 2022-01-01 | End: 2022-01-01 | Stop reason: HOSPADM

## 2022-01-01 RX ORDER — OXYCODONE HCL 5 MG/5 ML
10 SOLUTION, ORAL ORAL
Status: DISCONTINUED | OUTPATIENT
Start: 2022-01-01 | End: 2022-01-01 | Stop reason: HOSPADM

## 2022-01-01 RX ORDER — CALCIUM GLUCONATE 20 MG/ML
1 INJECTION, SOLUTION INTRAVENOUS ONCE
Status: DISCONTINUED | OUTPATIENT
Start: 2022-01-01 | End: 2022-01-01

## 2022-01-01 RX ORDER — AMOXICILLIN 250 MG
2 CAPSULE ORAL 2 TIMES DAILY
Status: DISCONTINUED | OUTPATIENT
Start: 2022-01-01 | End: 2022-01-01

## 2022-01-01 RX ORDER — KETOROLAC TROMETHAMINE 30 MG/ML
INJECTION, SOLUTION INTRAMUSCULAR; INTRAVENOUS PRN
Status: DISCONTINUED | OUTPATIENT
Start: 2022-01-01 | End: 2022-01-01 | Stop reason: SURG

## 2022-01-01 RX ORDER — ACETAMINOPHEN 325 MG/1
650 TABLET ORAL EVERY 6 HOURS
Status: DISCONTINUED | OUTPATIENT
Start: 2022-01-01 | End: 2022-01-01

## 2022-01-01 RX ORDER — ACETAMINOPHEN 325 MG/1
650 TABLET ORAL EVERY 6 HOURS PRN
Status: DISCONTINUED | OUTPATIENT
Start: 2022-01-01 | End: 2022-01-01

## 2022-01-01 RX ORDER — OXYCODONE HYDROCHLORIDE 10 MG/1
10 TABLET ORAL EVERY 4 HOURS PRN
Status: DISCONTINUED | OUTPATIENT
Start: 2022-01-01 | End: 2022-01-01

## 2022-01-01 RX ORDER — OXYCODONE HYDROCHLORIDE 5 MG/1
5 TABLET ORAL EVERY 6 HOURS PRN
Status: DISCONTINUED | OUTPATIENT
Start: 2022-01-01 | End: 2022-01-01

## 2022-01-01 RX ORDER — ACETAMINOPHEN 325 MG/1
650 TABLET ORAL EVERY 6 HOURS PRN
Status: DISCONTINUED | OUTPATIENT
Start: 2022-01-01 | End: 2022-01-01 | Stop reason: HOSPADM

## 2022-01-01 RX ORDER — POTASSIUM CHLORIDE 1500 MG/1
20 TABLET, EXTENDED RELEASE ORAL DAILY
Qty: 90 TABLET | Refills: 3 | Status: SHIPPED | OUTPATIENT
Start: 2022-01-01

## 2022-01-01 RX ORDER — OXYCODONE HYDROCHLORIDE 5 MG/1
5 TABLET ORAL EVERY 4 HOURS PRN
Status: DISCONTINUED | OUTPATIENT
Start: 2022-01-01 | End: 2022-01-01

## 2022-01-01 RX ORDER — ONDANSETRON 2 MG/ML
4 INJECTION INTRAMUSCULAR; INTRAVENOUS
Status: DISCONTINUED | OUTPATIENT
Start: 2022-01-01 | End: 2022-01-01 | Stop reason: HOSPADM

## 2022-01-01 RX ORDER — CYCLOBENZAPRINE HCL 10 MG
5 TABLET ORAL 3 TIMES DAILY PRN
Status: DISCONTINUED | OUTPATIENT
Start: 2022-01-01 | End: 2022-01-01 | Stop reason: HOSPADM

## 2022-01-01 RX ORDER — CALCIUM GLUCONATE 20 MG/ML
1 INJECTION, SOLUTION INTRAVENOUS ONCE
Status: COMPLETED | OUTPATIENT
Start: 2022-01-01 | End: 2022-01-01

## 2022-01-01 RX ORDER — ATORVASTATIN CALCIUM 80 MG/1
80 TABLET, FILM COATED ORAL EVERY EVENING
Status: DISCONTINUED | OUTPATIENT
Start: 2022-01-01 | End: 2022-01-01

## 2022-01-01 RX ORDER — DILTIAZEM HYDROCHLORIDE 120 MG/1
120 TABLET, FILM COATED ORAL DAILY
Status: DISCONTINUED | OUTPATIENT
Start: 2022-01-01 | End: 2022-01-01

## 2022-01-01 RX ORDER — SODIUM CHLORIDE, SODIUM LACTATE, POTASSIUM CHLORIDE, CALCIUM CHLORIDE 600; 310; 30; 20 MG/100ML; MG/100ML; MG/100ML; MG/100ML
INJECTION, SOLUTION INTRAVENOUS CONTINUOUS
Status: ACTIVE | OUTPATIENT
Start: 2022-01-01 | End: 2022-01-01

## 2022-01-01 RX ORDER — LORAZEPAM 2 MG/ML
1 INJECTION INTRAMUSCULAR
Status: DISCONTINUED | OUTPATIENT
Start: 2022-01-01 | End: 2022-01-01 | Stop reason: HOSPADM

## 2022-01-01 RX ORDER — OXYCODONE HCL 20 MG/ML
5-10 CONCENTRATE, ORAL ORAL
Status: DISCONTINUED | OUTPATIENT
Start: 2022-01-01 | End: 2022-01-01 | Stop reason: HOSPADM

## 2022-01-01 RX ORDER — HALOPERIDOL 5 MG/ML
2 INJECTION INTRAMUSCULAR
Status: DISCONTINUED | OUTPATIENT
Start: 2022-01-01 | End: 2022-01-01

## 2022-01-01 RX ORDER — DEXTROSE MONOHYDRATE 25 G/50ML
25 INJECTION, SOLUTION INTRAVENOUS
Status: DISCONTINUED | OUTPATIENT
Start: 2022-01-01 | End: 2022-01-01

## 2022-01-01 RX ORDER — DIPHENHYDRAMINE HYDROCHLORIDE 50 MG/ML
12.5 INJECTION INTRAMUSCULAR; INTRAVENOUS
Status: DISCONTINUED | OUTPATIENT
Start: 2022-01-01 | End: 2022-01-01 | Stop reason: HOSPADM

## 2022-01-01 RX ORDER — POLYETHYLENE GLYCOL 3350 17 G/17G
1 POWDER, FOR SOLUTION ORAL
Status: DISCONTINUED | OUTPATIENT
Start: 2022-01-01 | End: 2022-01-01

## 2022-01-01 RX ORDER — ACETAMINOPHEN 650 MG/1
325 SUPPOSITORY RECTAL EVERY 6 HOURS PRN
Status: DISCONTINUED | OUTPATIENT
Start: 2022-01-01 | End: 2022-01-01

## 2022-01-01 RX ORDER — CEFAZOLIN SODIUM 1 G/3ML
INJECTION, POWDER, FOR SOLUTION INTRAMUSCULAR; INTRAVENOUS PRN
Status: DISCONTINUED | OUTPATIENT
Start: 2022-01-01 | End: 2022-01-01 | Stop reason: SURG

## 2022-01-01 RX ORDER — HYDRALAZINE HYDROCHLORIDE 20 MG/ML
10 INJECTION INTRAMUSCULAR; INTRAVENOUS
Status: DISCONTINUED | OUTPATIENT
Start: 2022-01-01 | End: 2022-01-01 | Stop reason: HOSPADM

## 2022-01-01 RX ORDER — BUPIVACAINE HYDROCHLORIDE AND EPINEPHRINE 5; 5 MG/ML; UG/ML
INJECTION, SOLUTION EPIDURAL; INTRACAUDAL; PERINEURAL
Status: DISCONTINUED | OUTPATIENT
Start: 2022-01-01 | End: 2022-01-01 | Stop reason: HOSPADM

## 2022-01-01 RX ORDER — DEXAMETHASONE SODIUM PHOSPHATE 4 MG/ML
INJECTION, SOLUTION INTRA-ARTICULAR; INTRALESIONAL; INTRAMUSCULAR; INTRAVENOUS; SOFT TISSUE PRN
Status: DISCONTINUED | OUTPATIENT
Start: 2022-01-01 | End: 2022-01-01 | Stop reason: SURG

## 2022-01-01 RX ORDER — LIDOCAINE HYDROCHLORIDE 20 MG/ML
INJECTION, SOLUTION EPIDURAL; INFILTRATION; INTRACAUDAL; PERINEURAL PRN
Status: DISCONTINUED | OUTPATIENT
Start: 2022-01-01 | End: 2022-01-01 | Stop reason: SURG

## 2022-01-01 RX ORDER — DIGOXIN 250 MCG
250 TABLET ORAL DAILY
Status: DISCONTINUED | OUTPATIENT
Start: 2022-01-01 | End: 2022-01-01 | Stop reason: HOSPADM

## 2022-01-01 RX ORDER — LORAZEPAM 2 MG/ML
1 CONCENTRATE ORAL
Status: DISCONTINUED | OUTPATIENT
Start: 2022-01-01 | End: 2022-01-01 | Stop reason: HOSPADM

## 2022-01-01 RX ORDER — GABAPENTIN 100 MG/1
100 CAPSULE ORAL 3 TIMES DAILY
Status: DISCONTINUED | OUTPATIENT
Start: 2022-01-01 | End: 2022-01-01

## 2022-01-01 RX ORDER — HYDROMORPHONE HYDROCHLORIDE 1 MG/ML
0.25 INJECTION, SOLUTION INTRAMUSCULAR; INTRAVENOUS; SUBCUTANEOUS EVERY 6 HOURS PRN
Status: DISCONTINUED | OUTPATIENT
Start: 2022-01-01 | End: 2022-01-01

## 2022-01-01 RX ORDER — FUROSEMIDE 40 MG/1
40 TABLET ORAL DAILY
Status: DISCONTINUED | OUTPATIENT
Start: 2022-01-01 | End: 2022-01-01

## 2022-01-01 RX ORDER — HYDRALAZINE HYDROCHLORIDE 20 MG/ML
INJECTION INTRAMUSCULAR; INTRAVENOUS
Status: COMPLETED
Start: 2022-01-01 | End: 2022-01-01

## 2022-01-01 RX ORDER — SODIUM CHLORIDE 9 MG/ML
INJECTION, SOLUTION INTRAVENOUS CONTINUOUS
Status: DISCONTINUED | OUTPATIENT
Start: 2022-01-01 | End: 2022-01-01

## 2022-01-01 RX ORDER — HYDROMORPHONE HYDROCHLORIDE 1 MG/ML
0.25 INJECTION, SOLUTION INTRAMUSCULAR; INTRAVENOUS; SUBCUTANEOUS
Status: DISCONTINUED | OUTPATIENT
Start: 2022-01-01 | End: 2022-01-01

## 2022-01-01 RX ORDER — LIDOCAINE HYDROCHLORIDE 10 MG/ML
INJECTION, SOLUTION EPIDURAL; INFILTRATION; INTRACAUDAL; PERINEURAL
Status: ACTIVE
Start: 2022-01-01 | End: 2022-01-01

## 2022-01-01 RX ORDER — IPRATROPIUM BROMIDE AND ALBUTEROL SULFATE 2.5; .5 MG/3ML; MG/3ML
3 SOLUTION RESPIRATORY (INHALATION)
Status: DISCONTINUED | OUTPATIENT
Start: 2022-01-01 | End: 2022-01-01 | Stop reason: HOSPADM

## 2022-01-01 RX ORDER — MEPERIDINE HYDROCHLORIDE 25 MG/ML
25 INJECTION INTRAMUSCULAR; INTRAVENOUS; SUBCUTANEOUS
Status: DISCONTINUED | OUTPATIENT
Start: 2022-01-01 | End: 2022-01-01 | Stop reason: HOSPADM

## 2022-01-01 RX ORDER — LORAZEPAM 2 MG/ML
0.5 INJECTION INTRAMUSCULAR ONCE
Status: DISCONTINUED | OUTPATIENT
Start: 2022-01-01 | End: 2022-01-01

## 2022-01-01 RX ORDER — POLYETHYLENE GLYCOL 3350 17 G/17G
1 POWDER, FOR SOLUTION ORAL
Status: DISCONTINUED | OUTPATIENT
Start: 2022-01-01 | End: 2022-01-01 | Stop reason: HOSPADM

## 2022-01-01 RX ORDER — HYDROMORPHONE HYDROCHLORIDE 1 MG/ML
0.1 INJECTION, SOLUTION INTRAMUSCULAR; INTRAVENOUS; SUBCUTANEOUS
Status: DISCONTINUED | OUTPATIENT
Start: 2022-01-01 | End: 2022-01-01 | Stop reason: HOSPADM

## 2022-01-01 RX ORDER — POTASSIUM CHLORIDE 20 MEQ/1
20 TABLET, EXTENDED RELEASE ORAL DAILY
Status: DISCONTINUED | OUTPATIENT
Start: 2022-01-01 | End: 2022-01-01

## 2022-01-01 RX ORDER — MAGNESIUM SULFATE HEPTAHYDRATE 40 MG/ML
2 INJECTION, SOLUTION INTRAVENOUS ONCE
Status: COMPLETED | OUTPATIENT
Start: 2022-01-01 | End: 2022-01-01

## 2022-01-01 RX ORDER — HYDRALAZINE HYDROCHLORIDE 20 MG/ML
10 INJECTION INTRAMUSCULAR; INTRAVENOUS ONCE
Status: COMPLETED | OUTPATIENT
Start: 2022-01-01 | End: 2022-01-01

## 2022-01-01 RX ORDER — DIGOXIN 250 MCG
250 TABLET ORAL DAILY
Status: DISCONTINUED | OUTPATIENT
Start: 2022-01-01 | End: 2022-01-01

## 2022-01-01 RX ORDER — METOPROLOL TARTRATE 1 MG/ML
5 INJECTION, SOLUTION INTRAVENOUS EVERY 6 HOURS PRN
Status: DISCONTINUED | OUTPATIENT
Start: 2022-01-01 | End: 2022-01-01 | Stop reason: HOSPADM

## 2022-01-01 RX ORDER — DEXTROSE MONOHYDRATE, SODIUM CHLORIDE, AND POTASSIUM CHLORIDE 50; 2.98; 9 G/1000ML; G/1000ML; G/1000ML
INJECTION, SOLUTION INTRAVENOUS CONTINUOUS
Status: DISCONTINUED | OUTPATIENT
Start: 2022-01-01 | End: 2022-01-01

## 2022-01-01 RX ORDER — ROCURONIUM BROMIDE 10 MG/ML
INJECTION, SOLUTION INTRAVENOUS PRN
Status: DISCONTINUED | OUTPATIENT
Start: 2022-01-01 | End: 2022-01-01 | Stop reason: SURG

## 2022-01-01 RX ORDER — HALOPERIDOL 5 MG/ML
1 INJECTION INTRAMUSCULAR
Status: DISCONTINUED | OUTPATIENT
Start: 2022-01-01 | End: 2022-01-01 | Stop reason: HOSPADM

## 2022-01-01 RX ORDER — BISACODYL 10 MG
10 SUPPOSITORY, RECTAL RECTAL
Status: DISCONTINUED | OUTPATIENT
Start: 2022-01-01 | End: 2022-01-01

## 2022-01-01 RX ORDER — ACETAMINOPHEN 325 MG/1
650 TABLET ORAL EVERY 6 HOURS
Status: DISPENSED | OUTPATIENT
Start: 2022-01-01 | End: 2022-01-01

## 2022-01-01 RX ORDER — LIDOCAINE 50 MG/G
1 PATCH TOPICAL EVERY 24 HOURS
Status: DISCONTINUED | OUTPATIENT
Start: 2022-01-01 | End: 2022-01-01 | Stop reason: HOSPADM

## 2022-01-01 RX ORDER — ASPIRIN 300 MG/1
SUPPOSITORY RECTAL
Status: COMPLETED
Start: 2022-01-01 | End: 2022-01-01

## 2022-01-01 RX ORDER — LABETALOL HYDROCHLORIDE 5 MG/ML
10 INJECTION, SOLUTION INTRAVENOUS
Status: COMPLETED | OUTPATIENT
Start: 2022-01-01 | End: 2022-01-01

## 2022-01-01 RX ORDER — DIPHENHYDRAMINE HYDROCHLORIDE 50 MG/ML
25 INJECTION INTRAMUSCULAR; INTRAVENOUS EVERY 6 HOURS PRN
Status: DISCONTINUED | OUTPATIENT
Start: 2022-01-01 | End: 2022-01-01 | Stop reason: HOSPADM

## 2022-01-01 RX ORDER — FUROSEMIDE 10 MG/ML
20 INJECTION INTRAMUSCULAR; INTRAVENOUS
Status: DISCONTINUED | OUTPATIENT
Start: 2022-01-01 | End: 2022-01-01

## 2022-01-01 RX ORDER — HYDRALAZINE HYDROCHLORIDE 20 MG/ML
10 INJECTION INTRAMUSCULAR; INTRAVENOUS
Status: DISCONTINUED | OUTPATIENT
Start: 2022-01-01 | End: 2022-01-01

## 2022-01-01 RX ORDER — SODIUM CHLORIDE, SODIUM LACTATE, POTASSIUM CHLORIDE, CALCIUM CHLORIDE 600; 310; 30; 20 MG/100ML; MG/100ML; MG/100ML; MG/100ML
INJECTION, SOLUTION INTRAVENOUS CONTINUOUS
Status: DISCONTINUED | OUTPATIENT
Start: 2022-01-01 | End: 2022-01-01 | Stop reason: HOSPADM

## 2022-01-01 RX ORDER — BISACODYL 10 MG
10 SUPPOSITORY, RECTAL RECTAL
Status: DISCONTINUED | OUTPATIENT
Start: 2022-01-01 | End: 2022-01-01 | Stop reason: HOSPADM

## 2022-01-01 RX ORDER — OXYCODONE HYDROCHLORIDE 5 MG/1
2.5 TABLET ORAL
Status: DISCONTINUED | OUTPATIENT
Start: 2022-01-01 | End: 2022-01-01

## 2022-01-01 RX ORDER — CALCIUM CARBONATE 500 MG/1
1000 TABLET, CHEWABLE ORAL 2 TIMES DAILY
Status: DISCONTINUED | OUTPATIENT
Start: 2022-01-01 | End: 2022-01-01

## 2022-01-01 RX ORDER — FUROSEMIDE 40 MG/1
40 TABLET ORAL DAILY
Qty: 90 TABLET | Refills: 3 | Status: SHIPPED | OUTPATIENT
Start: 2022-01-01

## 2022-01-01 RX ORDER — OXYCODONE HCL 10 MG/1
10 TABLET, FILM COATED, EXTENDED RELEASE ORAL ONCE
Status: COMPLETED | OUTPATIENT
Start: 2022-01-01 | End: 2022-01-01

## 2022-01-01 RX ORDER — POTASSIUM CHLORIDE 20 MEQ/1
20 TABLET, EXTENDED RELEASE ORAL DAILY
Status: DISCONTINUED | OUTPATIENT
Start: 2022-01-01 | End: 2022-01-01 | Stop reason: HOSPADM

## 2022-01-01 RX ORDER — POTASSIUM CHLORIDE 750 MG/1
20 TABLET, FILM COATED, EXTENDED RELEASE ORAL DAILY
Status: DISCONTINUED | OUTPATIENT
Start: 2022-01-01 | End: 2022-01-01

## 2022-01-01 RX ORDER — OXYCODONE HYDROCHLORIDE 5 MG/1
2.5 TABLET ORAL EVERY 6 HOURS PRN
Status: DISCONTINUED | OUTPATIENT
Start: 2022-01-01 | End: 2022-01-01

## 2022-01-01 RX ORDER — HYDROMORPHONE HYDROCHLORIDE 1 MG/ML
0.5 INJECTION, SOLUTION INTRAMUSCULAR; INTRAVENOUS; SUBCUTANEOUS EVERY 4 HOURS PRN
Status: DISCONTINUED | OUTPATIENT
Start: 2022-01-01 | End: 2022-01-01

## 2022-01-01 RX ORDER — HYDROMORPHONE HYDROCHLORIDE 1 MG/ML
0.5 INJECTION, SOLUTION INTRAMUSCULAR; INTRAVENOUS; SUBCUTANEOUS
Status: DISCONTINUED | OUTPATIENT
Start: 2022-01-01 | End: 2022-01-01

## 2022-01-01 RX ORDER — HYDROMORPHONE HYDROCHLORIDE 1 MG/ML
0.2 INJECTION, SOLUTION INTRAMUSCULAR; INTRAVENOUS; SUBCUTANEOUS
Status: DISCONTINUED | OUTPATIENT
Start: 2022-01-01 | End: 2022-01-01 | Stop reason: HOSPADM

## 2022-01-01 RX ORDER — GABAPENTIN 100 MG/1
100 CAPSULE ORAL 3 TIMES DAILY
Status: DISCONTINUED | OUTPATIENT
Start: 2022-01-01 | End: 2022-01-01 | Stop reason: HOSPADM

## 2022-01-01 RX ORDER — ATROPINE SULFATE 10 MG/ML
2 SOLUTION/ DROPS OPHTHALMIC EVERY 4 HOURS PRN
Status: DISCONTINUED | OUTPATIENT
Start: 2022-01-01 | End: 2022-01-01 | Stop reason: HOSPADM

## 2022-01-01 RX ORDER — SODIUM CHLORIDE, SODIUM LACTATE, POTASSIUM CHLORIDE, CALCIUM CHLORIDE 600; 310; 30; 20 MG/100ML; MG/100ML; MG/100ML; MG/100ML
INJECTION, SOLUTION INTRAVENOUS
Status: DISCONTINUED | OUTPATIENT
Start: 2022-01-01 | End: 2022-01-01 | Stop reason: SURG

## 2022-01-01 RX ORDER — CYCLOBENZAPRINE HCL 10 MG
5 TABLET ORAL 3 TIMES DAILY PRN
Status: DISCONTINUED | OUTPATIENT
Start: 2022-01-01 | End: 2022-01-01

## 2022-01-01 RX ORDER — ACETAMINOPHEN 650 MG/1
650 SUPPOSITORY RECTAL EVERY 6 HOURS PRN
Status: DISCONTINUED | OUTPATIENT
Start: 2022-01-01 | End: 2022-01-01 | Stop reason: HOSPADM

## 2022-01-01 RX ORDER — HEPARIN SODIUM,PORCINE 1000/ML
VIAL (ML) INJECTION
Status: DISCONTINUED | OUTPATIENT
Start: 2022-01-01 | End: 2022-01-01 | Stop reason: HOSPADM

## 2022-01-01 RX ORDER — AMOXICILLIN 250 MG
2 CAPSULE ORAL 2 TIMES DAILY
Status: DISCONTINUED | OUTPATIENT
Start: 2022-01-01 | End: 2022-01-01 | Stop reason: HOSPADM

## 2022-01-01 RX ORDER — OXYCODONE AND ACETAMINOPHEN 7.5; 325 MG/1; MG/1
1 TABLET ORAL EVERY 4 HOURS PRN
Status: DISCONTINUED | OUTPATIENT
Start: 2022-01-01 | End: 2022-01-01 | Stop reason: HOSPADM

## 2022-01-01 RX ADMIN — GABAPENTIN 100 MG: 100 CAPSULE ORAL at 12:48

## 2022-01-01 RX ADMIN — ROCURONIUM BROMIDE 50 MG: 10 INJECTION, SOLUTION INTRAVENOUS at 12:38

## 2022-01-01 RX ADMIN — FUROSEMIDE 20 MG: 10 INJECTION, SOLUTION INTRAMUSCULAR; INTRAVENOUS at 10:48

## 2022-01-01 RX ADMIN — LIDOCAINE 1 PATCH: 50 PATCH TOPICAL at 07:58

## 2022-01-01 RX ADMIN — GABAPENTIN 100 MG: 100 CAPSULE ORAL at 04:11

## 2022-01-01 RX ADMIN — HYDROMORPHONE HYDROCHLORIDE 0.25 MG: 1 INJECTION, SOLUTION INTRAMUSCULAR; INTRAVENOUS; SUBCUTANEOUS at 02:03

## 2022-01-01 RX ADMIN — FUROSEMIDE 40 MG: 40 TABLET ORAL at 05:30

## 2022-01-01 RX ADMIN — DILTIAZEM HYDROCHLORIDE 120 MG: 120 TABLET, FILM COATED ORAL at 05:10

## 2022-01-01 RX ADMIN — LORAZEPAM 1 MG: 2 INJECTION INTRAMUSCULAR; INTRAVENOUS at 19:18

## 2022-01-01 RX ADMIN — ACETAMINOPHEN 1000 MG: 500 TABLET, FILM COATED ORAL at 13:31

## 2022-01-01 RX ADMIN — HYDROMORPHONE HYDROCHLORIDE 0.5 MG: 1 INJECTION, SOLUTION INTRAMUSCULAR; INTRAVENOUS; SUBCUTANEOUS at 05:01

## 2022-01-01 RX ADMIN — ACETAMINOPHEN 650 MG: 650 SUPPOSITORY RECTAL at 13:02

## 2022-01-01 RX ADMIN — HYDROMORPHONE HYDROCHLORIDE 0.25 MG: 1 INJECTION, SOLUTION INTRAMUSCULAR; INTRAVENOUS; SUBCUTANEOUS at 16:58

## 2022-01-01 RX ADMIN — ACETAMINOPHEN 650 MG: 325 TABLET, FILM COATED ORAL at 23:52

## 2022-01-01 RX ADMIN — HYDROMORPHONE HYDROCHLORIDE 0.5 MG: 1 INJECTION, SOLUTION INTRAMUSCULAR; INTRAVENOUS; SUBCUTANEOUS at 09:14

## 2022-01-01 RX ADMIN — SODIUM CHLORIDE: 9 INJECTION, SOLUTION INTRAVENOUS at 20:15

## 2022-01-01 RX ADMIN — DILTIAZEM HYDROCHLORIDE 120 MG: 120 TABLET, FILM COATED ORAL at 04:11

## 2022-01-01 RX ADMIN — LIDOCAINE 1 PATCH: 50 PATCH TOPICAL at 08:18

## 2022-01-01 RX ADMIN — LORAZEPAM 1 MG: 2 INJECTION INTRAMUSCULAR; INTRAVENOUS at 13:51

## 2022-01-01 RX ADMIN — HYDRALAZINE HYDROCHLORIDE 10 MG: 20 INJECTION INTRAMUSCULAR; INTRAVENOUS at 20:19

## 2022-01-01 RX ADMIN — ACETAMINOPHEN 650 MG: 325 TABLET, FILM COATED ORAL at 04:10

## 2022-01-01 RX ADMIN — LIDOCAINE 1 PATCH: 50 PATCH TOPICAL at 08:25

## 2022-01-01 RX ADMIN — HYDROMORPHONE HYDROCHLORIDE 0.5 MG: 1 INJECTION, SOLUTION INTRAMUSCULAR; INTRAVENOUS; SUBCUTANEOUS at 17:40

## 2022-01-01 RX ADMIN — ACETAMINOPHEN 650 MG: 325 TABLET, FILM COATED ORAL at 05:11

## 2022-01-01 RX ADMIN — FENTANYL CITRATE 100 MCG: 50 INJECTION, SOLUTION INTRAMUSCULAR; INTRAVENOUS at 12:38

## 2022-01-01 RX ADMIN — LIDOCAINE 1 PATCH: 50 PATCH TOPICAL at 09:53

## 2022-01-01 RX ADMIN — MIDAZOLAM 1 MG: 1 INJECTION INTRAMUSCULAR; INTRAVENOUS at 12:33

## 2022-01-01 RX ADMIN — INSULIN HUMAN 1 UNITS: 100 INJECTION, SOLUTION PARENTERAL at 23:55

## 2022-01-01 RX ADMIN — ATORVASTATIN CALCIUM 80 MG: 80 TABLET, FILM COATED ORAL at 20:15

## 2022-01-01 RX ADMIN — DIGOXIN 250 MCG: 250 TABLET ORAL at 04:11

## 2022-01-01 RX ADMIN — PROPOFOL 100 MG: 10 INJECTION, EMULSION INTRAVENOUS at 13:59

## 2022-01-01 RX ADMIN — DIGOXIN 250 MCG: 250 TABLET ORAL at 05:11

## 2022-01-01 RX ADMIN — POTASSIUM CHLORIDE 20 MEQ: 1500 TABLET, EXTENDED RELEASE ORAL at 05:11

## 2022-01-01 RX ADMIN — ATROPINE SULFATE 2 DROP: 10 SOLUTION/ DROPS OPHTHALMIC at 15:21

## 2022-01-01 RX ADMIN — OXYCODONE HYDROCHLORIDE 10 MG: 20 SOLUTION ORAL at 13:50

## 2022-01-01 RX ADMIN — ATORVASTATIN CALCIUM 80 MG: 80 TABLET, FILM COATED ORAL at 18:17

## 2022-01-01 RX ADMIN — ACETAMINOPHEN 650 MG: 325 TABLET, FILM COATED ORAL at 18:17

## 2022-01-01 RX ADMIN — HYDROMORPHONE HYDROCHLORIDE 0.5 MG: 1 INJECTION, SOLUTION INTRAMUSCULAR; INTRAVENOUS; SUBCUTANEOUS at 20:07

## 2022-01-01 RX ADMIN — ACETAMINOPHEN 650 MG: 325 TABLET, FILM COATED ORAL at 12:47

## 2022-01-01 RX ADMIN — FUROSEMIDE 40 MG: 40 TABLET ORAL at 04:11

## 2022-01-01 RX ADMIN — IOHEXOL 40 ML: 350 INJECTION, SOLUTION INTRAVENOUS at 08:30

## 2022-01-01 RX ADMIN — OXYCODONE HYDROCHLORIDE 5 MG: 20 SOLUTION ORAL at 12:40

## 2022-01-01 RX ADMIN — POTASSIUM CHLORIDE, DEXTROSE MONOHYDRATE AND SODIUM CHLORIDE: 300; 5; 900 INJECTION, SOLUTION INTRAVENOUS at 15:58

## 2022-01-01 RX ADMIN — CEFAZOLIN 3 G: 330 INJECTION, POWDER, FOR SOLUTION INTRAMUSCULAR; INTRAVENOUS at 13:45

## 2022-01-01 RX ADMIN — POTASSIUM CHLORIDE 20 MEQ: 1500 TABLET, EXTENDED RELEASE ORAL at 05:32

## 2022-01-01 RX ADMIN — SODIUM CHLORIDE, POTASSIUM CHLORIDE, SODIUM LACTATE AND CALCIUM CHLORIDE: 600; 310; 30; 20 INJECTION, SOLUTION INTRAVENOUS at 05:24

## 2022-01-01 RX ADMIN — LABETALOL HYDROCHLORIDE 10 MG: 5 INJECTION, SOLUTION INTRAVENOUS at 19:25

## 2022-01-01 RX ADMIN — LIDOCAINE HYDROCHLORIDE 40 MG: 20 INJECTION, SOLUTION EPIDURAL; INFILTRATION; INTRACAUDAL at 12:38

## 2022-01-01 RX ADMIN — SUGAMMADEX 200 MG: 100 INJECTION, SOLUTION INTRAVENOUS at 13:24

## 2022-01-01 RX ADMIN — ONDANSETRON 4 MG: 2 INJECTION INTRAMUSCULAR; INTRAVENOUS at 13:25

## 2022-01-01 RX ADMIN — MAGNESIUM SULFATE HEPTAHYDRATE 2 G: 40 INJECTION, SOLUTION INTRAVENOUS at 10:48

## 2022-01-01 RX ADMIN — OXYCODONE HYDROCHLORIDE 5 MG: 20 SOLUTION ORAL at 04:47

## 2022-01-01 RX ADMIN — ACETAMINOPHEN 650 MG: 325 TABLET, FILM COATED ORAL at 20:15

## 2022-01-01 RX ADMIN — LABETALOL HYDROCHLORIDE 10 MG: 5 INJECTION, SOLUTION INTRAVENOUS at 12:14

## 2022-01-01 RX ADMIN — SENNOSIDES AND DOCUSATE SODIUM 2 TABLET: 50; 8.6 TABLET ORAL at 04:11

## 2022-01-01 RX ADMIN — GABAPENTIN 100 MG: 100 CAPSULE ORAL at 18:17

## 2022-01-01 RX ADMIN — IOHEXOL 100 ML: 350 INJECTION, SOLUTION INTRAVENOUS at 08:30

## 2022-01-01 RX ADMIN — HYDROMORPHONE HYDROCHLORIDE 0.5 MG: 1 INJECTION, SOLUTION INTRAMUSCULAR; INTRAVENOUS; SUBCUTANEOUS at 13:13

## 2022-01-01 RX ADMIN — SODIUM CHLORIDE, POTASSIUM CHLORIDE, SODIUM LACTATE AND CALCIUM CHLORIDE: 600; 310; 30; 20 INJECTION, SOLUTION INTRAVENOUS at 12:33

## 2022-01-01 RX ADMIN — CEFAZOLIN 2 G: 330 INJECTION, POWDER, FOR SOLUTION INTRAMUSCULAR; INTRAVENOUS at 12:33

## 2022-01-01 RX ADMIN — LABETALOL HYDROCHLORIDE 10 MG: 5 INJECTION, SOLUTION INTRAVENOUS at 23:37

## 2022-01-01 RX ADMIN — ONDANSETRON 4 MG: 2 INJECTION INTRAMUSCULAR; INTRAVENOUS at 12:50

## 2022-01-01 RX ADMIN — EPHEDRINE SULFATE 25 MG: 50 INJECTION INTRAMUSCULAR; INTRAVENOUS; SUBCUTANEOUS at 14:03

## 2022-01-01 RX ADMIN — FENTANYL CITRATE 100 MCG: 50 INJECTION, SOLUTION INTRAMUSCULAR; INTRAVENOUS at 13:25

## 2022-01-01 RX ADMIN — FENTANYL CITRATE 50 MCG: 50 INJECTION, SOLUTION INTRAMUSCULAR; INTRAVENOUS at 14:49

## 2022-01-01 RX ADMIN — ATROPINE SULFATE 2 DROP: 10 SOLUTION/ DROPS OPHTHALMIC at 11:17

## 2022-01-01 RX ADMIN — KETOROLAC TROMETHAMINE 15 MG: 30 INJECTION, SOLUTION INTRAMUSCULAR at 13:00

## 2022-01-01 RX ADMIN — SODIUM CHLORIDE: 9 INJECTION, SOLUTION INTRAVENOUS at 08:57

## 2022-01-01 RX ADMIN — SENNOSIDES AND DOCUSATE SODIUM 2 TABLET: 50; 8.6 TABLET ORAL at 05:11

## 2022-01-01 RX ADMIN — OXYCODONE 5 MG: 5 TABLET ORAL at 23:53

## 2022-01-01 RX ADMIN — OXYCODONE HYDROCHLORIDE 10 MG: 20 SOLUTION ORAL at 17:11

## 2022-01-01 RX ADMIN — SENNOSIDES AND DOCUSATE SODIUM 2 TABLET: 50; 8.6 TABLET ORAL at 18:17

## 2022-01-01 RX ADMIN — DEXAMETHASONE SODIUM PHOSPHATE 8 MG: 4 INJECTION, SOLUTION INTRA-ARTICULAR; INTRALESIONAL; INTRAMUSCULAR; INTRAVENOUS; SOFT TISSUE at 12:50

## 2022-01-01 RX ADMIN — ROCURONIUM BROMIDE 30 MG: 10 INJECTION, SOLUTION INTRAVENOUS at 13:48

## 2022-01-01 RX ADMIN — LORAZEPAM 1 MG: 2 INJECTION INTRAMUSCULAR; INTRAVENOUS at 17:41

## 2022-01-01 RX ADMIN — OXYCODONE HYDROCHLORIDE 10 MG: 10 TABLET, FILM COATED, EXTENDED RELEASE ORAL at 13:31

## 2022-01-01 RX ADMIN — SODIUM CHLORIDE: 9 INJECTION, SOLUTION INTRAVENOUS at 20:22

## 2022-01-01 RX ADMIN — DEXAMETHASONE SODIUM PHOSPHATE 4 MG: 4 INJECTION, SOLUTION INTRAMUSCULAR; INTRAVENOUS at 13:45

## 2022-01-01 RX ADMIN — HYDROMORPHONE HYDROCHLORIDE 0.5 MG: 1 INJECTION, SOLUTION INTRAMUSCULAR; INTRAVENOUS; SUBCUTANEOUS at 12:49

## 2022-01-01 RX ADMIN — LIDOCAINE 1 PATCH: 50 PATCH TOPICAL at 10:19

## 2022-01-01 RX ADMIN — FUROSEMIDE 40 MG: 40 TABLET ORAL at 05:11

## 2022-01-01 RX ADMIN — FUROSEMIDE 40 MG: 40 TABLET ORAL at 18:05

## 2022-01-01 RX ADMIN — IOHEXOL 80 ML: 300 INJECTION, SOLUTION INTRAVENOUS at 11:00

## 2022-01-01 RX ADMIN — CALCIUM GLUCONATE 1 G: 20 INJECTION, SOLUTION INTRAVENOUS at 07:33

## 2022-01-01 RX ADMIN — ACETAMINOPHEN 650 MG: 325 TABLET, FILM COATED ORAL at 22:59

## 2022-01-01 RX ADMIN — OXYCODONE HYDROCHLORIDE 10 MG: 20 SOLUTION ORAL at 08:15

## 2022-01-01 RX ADMIN — HYDRALAZINE HYDROCHLORIDE 10 MG: 20 INJECTION INTRAMUSCULAR; INTRAVENOUS at 09:23

## 2022-01-01 RX ADMIN — SODIUM CHLORIDE, POTASSIUM CHLORIDE, SODIUM LACTATE AND CALCIUM CHLORIDE: 600; 310; 30; 20 INJECTION, SOLUTION INTRAVENOUS at 13:31

## 2022-01-01 RX ADMIN — LABETALOL HYDROCHLORIDE 10 MG: 5 INJECTION, SOLUTION INTRAVENOUS at 04:01

## 2022-01-01 RX ADMIN — SODIUM CHLORIDE: 9 INJECTION, SOLUTION INTRAVENOUS at 04:05

## 2022-01-01 RX ADMIN — MAGNESIUM SULFATE HEPTAHYDRATE 2 G: 40 INJECTION, SOLUTION INTRAVENOUS at 07:33

## 2022-01-01 RX ADMIN — SENNOSIDES AND DOCUSATE SODIUM 2 TABLET: 50; 8.6 TABLET ORAL at 20:15

## 2022-01-01 RX ADMIN — GABAPENTIN 100 MG: 100 CAPSULE ORAL at 06:00

## 2022-01-01 RX ADMIN — SENNOSIDES AND DOCUSATE SODIUM 2 TABLET: 50; 8.6 TABLET ORAL at 05:32

## 2022-01-01 RX ADMIN — PROPOFOL 100 MG: 10 INJECTION, EMULSION INTRAVENOUS at 12:38

## 2022-01-01 RX ADMIN — FENTANYL CITRATE 100 MCG: 50 INJECTION, SOLUTION INTRAMUSCULAR; INTRAVENOUS at 13:48

## 2022-01-01 RX ADMIN — SODIUM CHLORIDE, POTASSIUM CHLORIDE, SODIUM LACTATE AND CALCIUM CHLORIDE: 600; 310; 30; 20 INJECTION, SOLUTION INTRAVENOUS at 17:29

## 2022-01-01 RX ADMIN — ACETAMINOPHEN 650 MG: 325 TABLET, FILM COATED ORAL at 06:00

## 2022-01-01 RX ADMIN — POTASSIUM CHLORIDE 20 MEQ: 1500 TABLET, EXTENDED RELEASE ORAL at 04:10

## 2022-01-01 ASSESSMENT — COGNITIVE AND FUNCTIONAL STATUS - GENERAL
MOVING FROM LYING ON BACK TO SITTING ON SIDE OF FLAT BED: UNABLE
SUGGESTED CMS G CODE MODIFIER DAILY ACTIVITY: CN
HELP NEEDED FOR BATHING: TOTAL
MOVING TO AND FROM BED TO CHAIR: UNABLE
MOVING FROM LYING ON BACK TO SITTING ON SIDE OF FLAT BED: UNABLE
EATING MEALS: TOTAL
TURNING FROM BACK TO SIDE WHILE IN FLAT BAD: UNABLE
WALKING IN HOSPITAL ROOM: TOTAL
MOVING FROM LYING ON BACK TO SITTING ON SIDE OF FLAT BED: UNABLE
DRESSING REGULAR LOWER BODY CLOTHING: TOTAL
STANDING UP FROM CHAIR USING ARMS: A LITTLE
CLIMB 3 TO 5 STEPS WITH RAILING: TOTAL
CLIMB 3 TO 5 STEPS WITH RAILING: TOTAL
TURNING FROM BACK TO SIDE WHILE IN FLAT BAD: UNABLE
MOVING FROM LYING ON BACK TO SITTING ON SIDE OF FLAT BED: A LITTLE
WALKING IN HOSPITAL ROOM: A LITTLE
TURNING FROM BACK TO SIDE WHILE IN FLAT BAD: A LITTLE
MOBILITY SCORE: 6
SUGGESTED CMS G CODE MODIFIER DAILY ACTIVITY: CJ
HELP NEEDED FOR BATHING: A LOT
STANDING UP FROM CHAIR USING ARMS: TOTAL
DAILY ACTIVITIY SCORE: 11
DRESSING REGULAR LOWER BODY CLOTHING: A LOT
SUGGESTED CMS G CODE MODIFIER DAILY ACTIVITY: CL
MOBILITY SCORE: 6
MOVING TO AND FROM BED TO CHAIR: UNABLE
MOBILITY SCORE: 19
SUGGESTED CMS G CODE MODIFIER MOBILITY: CN
DRESSING REGULAR LOWER BODY CLOTHING: A LITTLE
DAILY ACTIVITIY SCORE: 21
SUGGESTED CMS G CODE MODIFIER MOBILITY: CN
PERSONAL GROOMING: A LOT
SUGGESTED CMS G CODE MODIFIER MOBILITY: CK
TOILETING: TOTAL
DRESSING REGULAR UPPER BODY CLOTHING: TOTAL
CLIMB 3 TO 5 STEPS WITH RAILING: A LITTLE
WALKING IN HOSPITAL ROOM: TOTAL
WALKING IN HOSPITAL ROOM: TOTAL
HELP NEEDED FOR BATHING: A LITTLE
MOBILITY SCORE: 6
MOVING TO AND FROM BED TO CHAIR: UNABLE
EATING MEALS: TOTAL
PERSONAL GROOMING: TOTAL
STANDING UP FROM CHAIR USING ARMS: TOTAL
TOILETING: A LOT
DAILY ACTIVITIY SCORE: 6
STANDING UP FROM CHAIR USING ARMS: TOTAL
DRESSING REGULAR UPPER BODY CLOTHING: A LOT
TOILETING: A LITTLE
TURNING FROM BACK TO SIDE WHILE IN FLAT BAD: UNABLE
SUGGESTED CMS G CODE MODIFIER MOBILITY: CN
CLIMB 3 TO 5 STEPS WITH RAILING: TOTAL

## 2022-01-01 ASSESSMENT — ENCOUNTER SYMPTOMS
PALPITATIONS: 0
WEIGHT LOSS: 0
FEVER: 0
MYALGIAS: 0
BACK PAIN: 0
NECK PAIN: 0
SENSORY CHANGE: 1
PALPITATIONS: 0
EYE REDNESS: 0
CHILLS: 0
FOCAL WEAKNESS: 1
DIZZINESS: 0
SPUTUM PRODUCTION: 0
BACK PAIN: 1
DIARRHEA: 0
COUGH: 0
FOCAL WEAKNESS: 1
SENSORY CHANGE: 1
VOMITING: 0
SENSORY CHANGE: 1
CHILLS: 0
HEMOPTYSIS: 0
PND: 0
HEMOPTYSIS: 0
STRIDOR: 0
EYE REDNESS: 0
FEVER: 0
NAUSEA: 0
SHORTNESS OF BREATH: 1
SPEECH CHANGE: 0
NERVOUS/ANXIOUS: 0
DIARRHEA: 0
CLAUDICATION: 0
WEAKNESS: 0
TREMORS: 0
NAUSEA: 0
MYALGIAS: 1
SPUTUM PRODUCTION: 0
CHILLS: 0
DEPRESSION: 0
MYALGIAS: 0
PND: 0
CLAUDICATION: 0
CONSTIPATION: 0
WHEEZING: 1
HEADACHES: 0
BACK PAIN: 0
NECK PAIN: 0
EYE REDNESS: 0
DIZZINESS: 0
CHILLS: 0
TREMORS: 0
SORE THROAT: 0
DIAPHORESIS: 0
SPUTUM PRODUCTION: 0
BRUISES/BLEEDS EASILY: 0
MEMORY LOSS: 0
EYE REDNESS: 0
CHILLS: 0
DEPRESSION: 0
FEVER: 0
CHILLS: 0
FOCAL WEAKNESS: 1
BLURRED VISION: 0
WEAKNESS: 1
PALPITATIONS: 0
CHILLS: 0
SPEECH CHANGE: 1
EYE PAIN: 0
HEADACHES: 0
FEVER: 0
VOMITING: 0
DEPRESSION: 0
BLURRED VISION: 0
MYALGIAS: 1
SHORTNESS OF BREATH: 0
FEVER: 0
MEMORY LOSS: 0
BACK PAIN: 1
MEMORY LOSS: 0
SPEECH CHANGE: 0
WHEEZING: 0
COUGH: 0
FOCAL WEAKNESS: 0
SHORTNESS OF BREATH: 0
CHANGE IN LEVEL OF CONSCIOUSNESS: 1
FEVER: 0
NAUSEA: 0
NAUSEA: 0
FEVER: 0
VOMITING: 0
SENSORY CHANGE: 1
FOCAL WEAKNESS: 1
SENSORY CHANGE: 1
SORE THROAT: 0
SHORTNESS OF BREATH: 0
HEMOPTYSIS: 0
SINUS PAIN: 0
NAUSEA: 0
BACK PAIN: 1
FOCAL WEAKNESS: 1
EYE REDNESS: 0
WEIGHT LOSS: 0
MYALGIAS: 1
BACK PAIN: 1
CHILLS: 0
DYSPNEA AT REST: 1
ABDOMINAL PAIN: 0
CONSTIPATION: 0
WHEEZING: 1
PHOTOPHOBIA: 0
EYE DISCHARGE: 0
VOMITING: 0
SHORTNESS OF BREATH: 1
DIZZINESS: 0
MYALGIAS: 0
NAUSEA: 0
MEMORY LOSS: 0
MYALGIAS: 1
WEAKNESS: 0
NAUSEA: 0
FALLS: 0
COUGH: 0
NECK PAIN: 1
EYE PAIN: 0
VOMITING: 0
EYE DISCHARGE: 0
BLURRED VISION: 0
FALLS: 0
PHOTOPHOBIA: 0
SORE THROAT: 0
EYE REDNESS: 0
HEARTBURN: 0
DOUBLE VISION: 0
SHORTNESS OF BREATH: 1
HEADACHES: 0
ORTHOPNEA: 0
HEARTBURN: 0
SINUS PAIN: 0
BACK PAIN: 1
SHORTNESS OF BREATH: 0
SENSORY CHANGE: 1
FOCAL WEAKNESS: 0
DIAPHORESIS: 0
ORTHOPNEA: 0
DOUBLE VISION: 0
NAUSEA: 0
FOCAL WEAKNESS: 1
FEVER: 0
SHORTNESS OF BREATH: 0
VOMITING: 0
VOMITING: 0
STRIDOR: 0
ABDOMINAL PAIN: 0
MEMORY LOSS: 0
ABDOMINAL PAIN: 0
SHORTNESS OF BREATH: 0
VOMITING: 0
EYE REDNESS: 0
BACK PAIN: 1

## 2022-01-01 ASSESSMENT — LIFESTYLE VARIABLES
AVERAGE NUMBER OF DAYS PER WEEK YOU HAVE A DRINK CONTAINING ALCOHOL: 5
TOTAL SCORE: 0
HOW MANY TIMES IN THE PAST YEAR HAVE YOU HAD 5 OR MORE DRINKS IN A DAY: 0
ALCOHOL_USE: YES
CONSUMPTION TOTAL: NEGATIVE
ON A TYPICAL DAY WHEN YOU DRINK ALCOHOL HOW MANY DRINKS DO YOU HAVE: 1
DOES PATIENT WANT TO STOP DRINKING: NO
TOTAL SCORE: 0
HAVE YOU EVER FELT YOU SHOULD CUT DOWN ON YOUR DRINKING: NO
HAVE YOU EVER FELT YOU SHOULD CUT DOWN ON YOUR DRINKING: NO
AVERAGE NUMBER OF DAYS PER WEEK YOU HAVE A DRINK CONTAINING ALCOHOL: 1
HAVE PEOPLE ANNOYED YOU BY CRITICIZING YOUR DRINKING: NO
TOTAL SCORE: 0
HAVE PEOPLE ANNOYED YOU BY CRITICIZING YOUR DRINKING: NO
HOW MANY TIMES IN THE PAST YEAR HAVE YOU HAD 5 OR MORE DRINKS IN A DAY: 0
TOTAL SCORE: 0
TOTAL SCORE: 0
EVER HAD A DRINK FIRST THING IN THE MORNING TO STEADY YOUR NERVES TO GET RID OF A HANGOVER: NO
CONSUMPTION TOTAL: NEGATIVE
EVER FELT BAD OR GUILTY ABOUT YOUR DRINKING: NO
EVER HAD A DRINK FIRST THING IN THE MORNING TO STEADY YOUR NERVES TO GET RID OF A HANGOVER: NO
ALCOHOL_USE: YES
EVER FELT BAD OR GUILTY ABOUT YOUR DRINKING: NO
ON A TYPICAL DAY WHEN YOU DRINK ALCOHOL HOW MANY DRINKS DO YOU HAVE: 1
TOTAL SCORE: 0

## 2022-01-01 ASSESSMENT — GAIT ASSESSMENTS
GAIT LEVEL OF ASSIST: UNABLE TO PARTICIPATE
GAIT LEVEL OF ASSIST: UNABLE TO PARTICIPATE

## 2022-01-01 ASSESSMENT — PAIN DESCRIPTION - PAIN TYPE
TYPE: CHRONIC PAIN
TYPE: ACUTE PAIN
TYPE: CHRONIC PAIN
TYPE: ACUTE PAIN;CHRONIC PAIN
TYPE: ACUTE PAIN
TYPE: ACUTE PAIN
TYPE: ACUTE PAIN;CHRONIC PAIN
TYPE: ACUTE PAIN
TYPE: SURGICAL PAIN
TYPE: ACUTE PAIN;SURGICAL PAIN
TYPE: ACUTE PAIN
TYPE: CHRONIC PAIN
TYPE: ACUTE PAIN
TYPE: ACUTE PAIN
TYPE: SURGICAL PAIN
TYPE: ACUTE PAIN
TYPE: ACUTE PAIN
TYPE: SURGICAL PAIN
TYPE: ACUTE PAIN

## 2022-01-01 ASSESSMENT — PATIENT HEALTH QUESTIONNAIRE - PHQ9
1. LITTLE INTEREST OR PLEASURE IN DOING THINGS: NOT AT ALL
2. FEELING DOWN, DEPRESSED, IRRITABLE, OR HOPELESS: NOT AT ALL
CLINICAL INTERPRETATION OF PHQ2 SCORE: 0
1. LITTLE INTEREST OR PLEASURE IN DOING THINGS: NOT AT ALL
1. LITTLE INTEREST OR PLEASURE IN DOING THINGS: NOT AT ALL
2. FEELING DOWN, DEPRESSED, IRRITABLE, OR HOPELESS: NOT AT ALL
SUM OF ALL RESPONSES TO PHQ9 QUESTIONS 1 AND 2: 0
1. LITTLE INTEREST OR PLEASURE IN DOING THINGS: NOT AT ALL
2. FEELING DOWN, DEPRESSED, IRRITABLE, OR HOPELESS: NOT AT ALL
SUM OF ALL RESPONSES TO PHQ9 QUESTIONS 1 AND 2: 0
2. FEELING DOWN, DEPRESSED, IRRITABLE, OR HOPELESS: NOT AT ALL

## 2022-01-01 ASSESSMENT — PULMONARY FUNCTION TESTS
FEV1/FVC_PERCENT_CHANGE: 150
FVC: 2.77
FEV1_LLN: 2.63
FEV1: 2.12
FEV1: 2.24
FEV1/FVC: 76
FVC_LLN: 3.62
FEV1/FVC: 77.51
FEV1/FVC_PERCENT_PREDICTED: 73
FEV1/FVC_PREDICTED: 74
FEV1_PERCENT_CHANGE: 6
FEV1/FVC_PERCENT_CHANGE: 1
FEV1/FVC: 78
FVC_PERCENT_PREDICTED: 63
FEV1_PREDICTED: 3.16
FEV1/FVC_PERCENT_PREDICTED: 108
FEV1/FVC_PERCENT_PREDICTED: 105
FEV1/FVC: 77
FEV1/FVC_PERCENT_PREDICTED: 103
FEV1/FVC_PERCENT_LLN: 61
FEV1_PERCENT_PREDICTED: 71
FVC_PREDICTED: 4.34
FEV1/FVC_PERCENT_PREDICTED: 106
FEV1_PERCENT_PREDICTED: 67
FVC_PERCENT_PREDICTED: 66
FVC: 2.89
FEV1_PERCENT_CHANGE: 4

## 2022-01-01 ASSESSMENT — FIBROSIS 4 INDEX
FIB4 SCORE: 2.07
FIB4 SCORE: 2.07
FIB4 SCORE: 2.11
FIB4 SCORE: 1.74
FIB4 SCORE: 2.03
FIB4 SCORE: 2.07
FIB4 SCORE: 2
FIB4 SCORE: 2.11
FIB4 SCORE: 2.03
FIB4 SCORE: 2

## 2022-01-01 ASSESSMENT — ACTIVITIES OF DAILY LIVING (ADL)
DRESSING_REQUIRES_ASSISTANCE: 1
PHYSICAL_TRANSFER_REQUIRES_ASSISTANCE: 1
TOILETING: INDEPENDENT
AMBULATION_REQUIRES_ASSISTANCE: 1
EATING_REQUIRES_ASSISTANCE: 1
CONTINENCE_REQUIRES_ASSISTANCE: 1

## 2022-01-01 ASSESSMENT — PAIN SCALES - GENERAL
PAIN_LEVEL: 0
PAIN_LEVEL: 0

## 2022-01-03 NOTE — PATIENT COMMUNICATION
Called [facility] pharmacy and confirmed prescription just needed to be filled. Tech confirmed the medication has been processed and will be ready for  shortly.

## 2022-01-12 NOTE — TELEPHONE ENCOUNTER
To Nan Wynn APRN - ok to proceed? Ok to hold Eliquis?    Received clearance request from [facility] for Hernia Repair. Requesting to hold Eliquis for 2 days.    HX AF with diastolic dysfunction, DERREK untreated with moderate PAH, DVT with PE, venous insufficiency

## 2022-01-12 NOTE — LETTER
PROCEDURE/SURGERY CLEARANCE FORM      Encounter Date: 1/12/2022    Patient: Alfred Diaz  YOB: 1940    CARDIOLOGIST:  Nan Wynn DNP, APRN   REFERRING DOCTOR:  Itzel Bennett MD    The above patient may proceed with the following procedure/surgery: hernia repair                                           Additional comments: May hold Eliquis 2 days prior and recommend be restarted as soon as possible after surgery due to history of post-op DVT                 Nan Wynn DNP, APRN

## 2022-01-12 NOTE — TELEPHONE ENCOUNTER
Yes, OK to hold eliquis. Noted to have prior provoked DVT and PE post-op from prior surgery.    Consider lovenox dosing post-op and bridging per surgeon's discretion. Not truly warranted with eliquis. SC

## 2022-01-13 NOTE — TELEPHONE ENCOUNTER
Discussed with Dr. Pickett per APRN's request, and he does not recommend bridging but recommends restarting Eliquis as soon as possible post op.    Clearance letter generated and faxed to [facility] [phone]

## 2022-01-17 NOTE — PROGRESS NOTES
Chief Complaint   Patient presents with   • Establish Care     REFERRAL 12/23/21 Dr. Amin   • Results     CXR 8/27/21, ct abdomen/pelvis 11/1/21        HPI: This patient is a 82 y.o. male presenting for evaluation of pulmonary hypertension and elevated L hemidiaphragm.  The patient's past medical history is significant for pulmonary embolism which occurred following laminectomy sometime in early 2011 [place] for which he was treated with anticoagulation and later stopped.  Subsequent to that he developed atrial fibrillation and has been on anticoagulation he will use for at least the past 10 years for atrial fibrillation.  He has degenerative joint disease status post bilateral knee replacement and spine stimulator placed in September 2019.  More recently he was diagnosed with severe obstructive sleep apnea and is pending initiation of noninvasive ventilation for this.  He relocated to [place] from [facility] in spring 2020 after which he had increased shortness of breath.  No chest pain, cough or wheezing.  He is a lifelong non-smoker.  He is retired from a career working with [employer].  No known occupational or environmental exposures.  Echocardiogram from December 2020 showed normal left ventricular systolic function with indeterminate diastolic function, mildly dilated RV with RVSP of 45 mmHg and dilated IVC with inspiratory collapse.  Patient had enlarged left and right atria.  Of note this was prior to initiation of therapy for DERREK.  Chest imaging I have is a chest x-ray from August 2021 demonstrates markedly elevated left diaphragm.  As per above, patient does report dyspnea on exertion but denies orthopnea.    Past Medical History:   Diagnosis Date   • A-fib ([facility])    • Apnea, sleep    • Arthritis    • Atrial fibrillation ([facility])    • Back pain    • Back pain    • Chickenpox    • Chronic anticoagulation    • DVT (deep venous thrombosis) ([facility])    • Falls    • Frequent urination    • Thai measles    •  Hearing difficulty    • Mumps    • DERREK (obstructive sleep apnea)    • PAH (pulmonary artery hypertension) ([facility])    • Painful joint    • PE (pulmonary thromboembolism) ([facility])    • Pulmonary embolism (HCC)    • Rash    • Shortness of breath    • Sleep apnea    • Snoring    • Swelling of lower extremity    • Venous insufficiency        Social History     Socioeconomic History   • Marital status:      Spouse name: Not on file   • Number of children: Not on file   • Years of education: Not on file   • Highest education level: Not on file   Occupational History   • Not on file   Tobacco Use   • Smoking status: Former Smoker     Packs/day: 0.00     Years: 3.00     Pack years: 0.00     Types: Cigars     Quit date: 1955     Years since quittin.2   • Smokeless tobacco: Never Used   Vaping Use   • Vaping Use: Never used   Substance and Sexual Activity   • Alcohol use: Yes     Alcohol/week: 3.6 oz     Types: 2 Glasses of wine, 4 Cans of beer per week     Comment: Moderate   • Drug use: Never   • Sexual activity: Not on file   Other Topics Concern   • Not on file   Social History Narrative   • Not on file     Social Determinants of Health     Financial Resource Strain:    • Difficulty of Paying Living Expenses: Not on file   Food Insecurity:    • Worried About Running Out of Food in the Last Year: Not on file   • Ran Out of Food in the Last Year: Not on file   Transportation Needs:    • Lack of Transportation (Medical): Not on file   • Lack of Transportation (Non-Medical): Not on file   Physical Activity:    • Days of Exercise per Week: Not on file   • Minutes of Exercise per Session: Not on file   Stress:    • Feeling of Stress : Not on file   Social Connections:    • Frequency of Communication with Friends and Family: Not on file   • Frequency of Social Gatherings with Friends and Family: Not on file   • Attends Restorationism Services: Not on file   • Active Member of Clubs or Organizations: Not on file   • Attends  Club or Organization Meetings: Not on file   • Marital Status: Not on file   Intimate Partner Violence:    • Fear of Current or Ex-Partner: Not on file   • Emotionally Abused: Not on file   • Physically Abused: Not on file   • Sexually Abused: Not on file   Housing Stability:    • Unable to Pay for Housing in the Last Year: Not on file   • Number of Places Lived in the Last Year: Not on file   • Unstable Housing in the Last Year: Not on file       Family History   Problem Relation Age of Onset   • Cancer Mother    • Lung Disease Father        Current Outpatient Medications on File Prior to Visit   Medication Sig Dispense Refill   • potassium Chloride ER (K-TAB) 20 MEQ Tab CR tablet Take 1 Tablet by mouth every day. 90 Tablet 3   • furosemide (LASIX) 40 MG Tab Take 1 Tablet by mouth every day. 90 Tablet 3   • digoxin (LANOXIN) 250 MCG Tab Take 1 Tablet by mouth every day. 90 Tablet 3   • ELIQUIS 5 MG Tab Take 5 mg by mouth 2 Times a Day.     • traMADol (ULTRAM) 50 MG Tab Take 50 mg by mouth 2 times a day.     • DILTIAZem (CARDIZEM) 120 MG Tab Take 120 mg by mouth every day.     • Acetaminophen (TYLENOL 8 HOUR PO) Take  by mouth.       No current facility-administered medications on file prior to visit.       Allergies: Vicodin [apap-fd&c yellow #10 [place]-hydrocodone] and Atorvastatin    ROS:   Review of Systems   Constitutional: Negative for chills, diaphoresis, fever, malaise/fatigue and weight loss.   HENT: Negative for congestion, ear discharge, ear pain, hearing loss, nosebleeds, sinus pain, sore throat and tinnitus.    Eyes: Negative for blurred vision, double vision, photophobia, pain, discharge and redness.   Respiratory: Positive for shortness of breath and wheezing. Negative for cough, hemoptysis, sputum production and stridor.    Cardiovascular: Negative for chest pain, palpitations, orthopnea, claudication, leg swelling and PND.   Gastrointestinal: Negative for abdominal pain, constipation, diarrhea,  "heartburn, nausea and vomiting.   Genitourinary: Negative for dysuria and urgency.   Musculoskeletal: Negative for back pain, falls, joint pain, myalgias and neck pain.   Skin: Negative for itching and rash.   Neurological: Negative for dizziness, tremors, speech change, focal weakness, weakness and headaches.   Endo/Heme/Allergies: Negative for environmental allergies.   Psychiatric/Behavioral: Negative for depression.       /66 (BP Location: Right arm, Patient Position: Sitting, BP Cuff Size: Large adult)   Pulse (!) 53   Temp 36.3 °C (97.3 °F) (Temporal)   Resp 16   Ht 1.93 m (6' 4\")   Wt 102 kg (224 lb)   SpO2 94%     Physical Exam:  Physical Exam  Vitals reviewed.   Constitutional:       General: He is not in acute distress.     Appearance: Normal appearance. He is normal weight.   HENT:      Head: Normocephalic and atraumatic.      Right Ear: External ear normal.      Left Ear: External ear normal.      Nose: Nose normal. No congestion.      Mouth/Throat:      Mouth: Mucous membranes are moist.      Pharynx: Oropharynx is clear. No oropharyngeal exudate.   Eyes:      General: No scleral icterus.     Extraocular Movements: Extraocular movements intact.      Conjunctiva/sclera: Conjunctivae normal.      Pupils: Pupils are equal, round, and reactive to light.   Cardiovascular:      Rate and Rhythm: Bradycardia present. Rhythm irregular.      Heart sounds: Normal heart sounds. No murmur heard.      Pulmonary:      Effort: Pulmonary effort is normal. No respiratory distress.      Breath sounds: No wheezing or rales.      Comments: Decreased bs LL base  Abdominal:      General: There is no distension.      Palpations: Abdomen is soft.   Musculoskeletal:         General: Normal range of motion.      Cervical back: Normal range of motion and neck supple.      Right lower leg: No edema.      Left lower leg: No edema.   Skin:     General: Skin is warm and dry.      Findings: No rash.   Neurological:      " Mental Status: He is alert and oriented to person, place, and time.      Cranial Nerves: No cranial nerve deficit.   Psychiatric:         Mood and Affect: Mood normal.         Behavior: Behavior normal.         PFTs as reviewed by me personally: pending    Imaging as reviewed by me personally: as per hPI    Assessment:  1. History of pulmonary embolism  PULMONARY FUNCTION TESTS -Test requested: Complete Pulmonary Function Test   2. Pulmonary hypertension (HCC)  NM-LUNG VENT/PERF IMAGING    DX-CHEST-2 VIEWS   3. Permanent atrial fibrillation (HCC)     4. Elevated hemidiaphragm  PULMONARY FUNCTION TESTS -Test requested: Complete Pulmonary Function Test    DX SNIFF TEST   5. DERREK (obstructive sleep apnea)         Plan:  1. this is a historical diagnosis.  Was presumably provoked.  He was treated at the time and anticoagulation was stopped.  Anticoagulation was then resumed for atrial fibrillation therefore I have low suspicion for chronic thromboembolic disease but we will proceed with VQ scan to rule this out.  2. chronicity unknown but I suspect combination of left-sided heart disease in a patient with chronic atrial fibrillation and severe untreated obstructive sleep apnea.  As per above, low suspicion for chronic thromboembolic disease however I have ordered PET scan.  He is on anticoagulation.  3. followed by cardiology.  He is on anticoagulation on digoxin.  Heart rate is actually quite low today at 53 but patient is asymptomatic.  Follow-up with cardiology.  4. chronicity is unknown but exam does suggest possible dysfunction of left hemidiaphragm.  We have ordered sniff test.  A paralyzed hemidiaphragm may explain some of his sleep disordered breathing.  Given unknown chronicity, I am not sure if he would be a candidate for plication but certainly may benefit from pulmonary rehab.  Pulmonary function testing ordered to evaluate for restriction.  5. severe and has been seen by sleep medicine.  He is pending  initiation of therapy for this.  Return in about 4 months (around 5/17/2022) for PFT, VQ w/CXR and sniff .

## 2022-01-20 NOTE — LETTER
February 11, 2022        Alfred Keiko Joe    Dear Dr. Bennett,    I have seen and evaluated the patient Alfred Diaz in pulmonary medicine clinic for preoperative pulmonary evaluation for hernia repair. After clinical evaluation and pulmonary function testing, he is deemed low risk for perioperative pulmonary complications. Please do not hesitate to contact me with any further questions.        Santa Marks M.D.

## 2022-02-10 NOTE — PROCEDURES
Technician: Kelsey Shaw RRT, CPFT  Good patient effort & cooperation.  The results of this test meet the ATS/ERS standards for acceptability & reproducibility.  Test was performed on the [employer] Body Plethysmograph-Elite DX system.  Predicted equations for Spirometry are GLI-2012, ITS for lung volumes, and GLI-2017 for DLCO.  The DLCO was uncorrected for Hgb.  A bronchodilator of Ventolin HFA -2puffs via spacer administered.  DLCO performed during dilation period. IVC is less than 90%    Pulmonary function test  Ordering physician: Santa Marks  Reviewing physician: Tarsha Graves  Reason for study: History of PE, elevated hemidiaphragm    Results:  Spirometry:  FVC is 2.77 which is 63% of predicted without a significant change postbronchodilator  FEV1 is 2.12 which is 67% of predicted without a significant change postbronchodilator  FEV1/FVC is 76    Lung volumes:  TLC is 4.89 which is 62% of predicted  RV is 2.33 which is 79% of predicted    Diffusion capacity:  DLCO is 24.99 which is 93% of predicted  DL/VA is 6 which is 169% of predicted    Interpretation:  1.  Spirometry is normal  2.  There is no significant change postbronchodilator  3.  Flow volume loop is consistent with a restriction  4.  Lung volumes demonstrate a restriction with a moderate decrease in TLC.  5.  Diffusion capacity is normal  6.  There are no prior studies for comparison    Tarsha Graves MD RD  Pulmonary and Critical Care    Available on [facility]

## 2022-02-17 NOTE — LETTER
PROCEDURE/SURGERY CLEARANCE FORM      Encounter Date: 02/18/2022    Patient: Alfred Diaz  YOB: 1940    CARDIOLOGIST:  AMAYA Draper     REFERRING DOCTOR:  Itzel Bennett MD          The above patient is cleared to have the following procedure/surgery: Right inguinal hernia repair.                                            Additional comments: Patient to hold Eliquis 48 hours prior to procedure.         AMAYA Draper     Electronically Signed

## 2022-02-17 NOTE — TELEPHONE ENCOUNTER
Received clearance request from [facility]. Fax response to [phone].    Procedure: Right inguinal hernia repair    Date: TBD    HX: Afib, CHF, venous insufficiency, DERREK (un-treated) with PAH    Last seen 11/03/21.        To SC: Ok to proceed with procedure and hold Eliquis 48 hours prior?

## 2022-02-18 NOTE — TELEPHONE ENCOUNTER
Clearance letter generated and faxed to [facility] at [phone]. Receipt confirmed.

## 2022-03-09 NOTE — OR NURSING
"Preadmit appointment: \" Preparing for your Procedure information\" sheet given to patient with verbal and written instructions. Patient instructed to continue prescribed medications through the day before surgery, instructed to take the following medications the day of surgery per anesthesia protocol: TYLENOL, LANOXIN, CARDIZEM, ULTRAM,    Pt states has been instructed to stop Eliquis 3/21/22 by cardiology PA    STOP/BANG protocol initiated    FALL RISK ordered and protocol initiated.               "

## 2022-03-10 NOTE — OR NURSING
Dr Antonio reviewed patients EKG's & medical history. Based upon information available, Dr Antonio indicates:     Given this patient’s age, history of atrial fibrillation and PE, chronic anticoagulation, and an abnormal ECG read by the cardiologist as having potential for diffuse ischemia, patient should get a medical clearance and PCP evaluation prior to his surgery. Thank you.    Davin Antonio M.D.  [facility]    Above note faxed to Dr Bennett and melinda Vaughn. She states she just faxed clearances they had obtained previously. Explained EKG was new read. She will give note to nurse to see if they can get addendum note on clearance notes.

## 2022-03-10 NOTE — TELEPHONE ENCOUNTER
ELIZABETH Chowdhury with [facility] called as their were possibly new findings on the pre-op EKG and they just need to make sure the clearance is still okay to move forward with the procedure on 3/23/2022.     Best Contact Number: [phone]

## 2022-03-11 NOTE — TELEPHONE ENCOUNTER
Pre op EKG reviewed with Nan CONDE.   Pt okay to proceed with RIGHT INGUINAL HERNIA REPAIR scheduled on 3/23/22. Verbal order per SC.

## 2022-03-17 NOTE — PROGRESS NOTES
Subjective:     CC:   Chief Complaint   Patient presents with   • Follow-Up       HPI:   Alfred presents today to get cleared for his right inguinal hernia repair.  I did call [facility] they did receive a clearance from his pulmonologist along with his cardiologist.  Patient knows when to stop his Eliquis also.  Patient states he is doing well and has no concerns or complaints.    Past Medical History:   Diagnosis Date   • A-fib ([facility])    • Apnea, sleep    • Arthritis     osteo, bilateral knees, back   • Atrial fibrillation ([facility])    • Back pain    • Back pain    • Chickenpox    • Chronic anticoagulation    • Dental disorder     Full dentures   • DVT (deep venous thrombosis) ([facility])    • Eczema    • Falls    • Frequent urination    • [place]yrgy[place] measles    • Hearing difficulty    • Hypertension    • Mumps    • DERREK (obstructive sleep apnea)    • PAH (pulmonary artery hypertension) ([facility])    • Painful joint     Bilateral knees   • PE (pulmonary thromboembolism) ([facility])    • Pulmonary embolism ([facility])    • Rash    • Shortness of breath    • Sleep apnea    • Snoring    • Swelling of lower extremity    • Venous insufficiency        Social History     Tobacco Use   • Smoking status: Former Smoker     Packs/day: 0.00     Years: 3.00     Pack years: 0.00     Types: Cigars     Quit date: 1955     Years since quittin.4   • Smokeless tobacco: Never Used   Vaping Use   • Vaping Use: Never used   Substance Use Topics   • Alcohol use: Yes     Alcohol/week: 3.6 oz     Types: 2 Glasses of wine, 4 Cans of beer per week     Comment: Moderate   • Drug use: Never       Current Outpatient Medications Ordered in Epic   Medication Sig Dispense Refill   • potassium Chloride ER (K-TAB) 20 MEQ Tab CR tablet Take 1 Tablet by mouth every day. 90 Tablet 3   • furosemide (LASIX) 40 MG Tab Take 1 Tablet by mouth every day. 90 Tablet 3   • digoxin (LANOXIN) 250 MCG Tab Take 1 Tablet by mouth every day. 90 Tablet 3   • ELIQUIS 5 MG Tab Take 5 mg  "by mouth 2 Times a Day.     • traMADol (ULTRAM) 50 MG Tab Take 50 mg by mouth 2 times a day.     • DILTIAZem (CARDIZEM) 120 MG Tab Take 120 mg by mouth every day.     • Acetaminophen (TYLENOL 8 HOUR PO) Take  by mouth.       No current Epic-ordered facility-administered medications on file.       Allergies:  Vicodin [apap-fd&c yellow #10 [place]-hydrocodone]    Health Maintenance: Completed    ROS:  Gen: no fevers/chills, no changes in weight  Eyes: no changes in vision  ENT: no sore throat, no hearing loss, no bloody nose  Pulm: no sob, no cough  CV: no chest pain, no palpitations  Neuro: no headaches, no numbness/tingling  Heme/Lymph: no easy bruising    Objective:     Exam:  /74   Pulse 77   Temp 36.6 °C (97.9 °F)   Resp 16   Ht 1.93 m (6' 4\")   Wt 103 kg (227 lb 3.2 oz)   SpO2 94%   BMI 27.66 kg/m²  Body mass index is 27.66 kg/m².    Gen: Alert and oriented, No apparent distress.  Skin: Warm and dry.  No obvious lesions.  Eyes: Sclera wnl Pupils normal in size  Lungs: Normal effort, CTA bilaterally, no wheezes, rhonchi, or rales  CV: Irregular rate. No murmurs, rubs, or gallops.  Musculoskeletal: Normal gait. No extremity cyanosis, clubbing, or edema.  Neuro: Oriented to person, place and time  Psych: Mood is wnl       Assessment & Plan:     82 y.o. male with the following -     1. DERREK (obstructive sleep apnea)  Patient has not gotten his CPAP machine as of yet I did call [facility] to let them know.  They will be observing him very carefully after postop and if needed O2 would be sent home with him.  Patient is planning on getting his CPAP machine after  his recovery.  Patient has been cleared by pulmonary this is a chronic problem    2. Bilateral recurrent inguinal hernia without obstruction or gangrene  Patient is going to have a right inguinal hernia hernia repair next week.  CBC shows no increase in WBC or decrease in RBC.  Basic metabolic panel appears within normal limits.  Patient is " cleared for the surgery       Return if symptoms worsen or fail to improve.    Please note that this dictation was created using voice recognition software. I have made every reasonable attempt to correct obvious errors, but I expect that there are errors of grammar and possibly content that I did not discover before finalizing the note.

## 2022-03-23 PROBLEM — G89.18 POST PROCEDURE DISCOMFORT: Status: ACTIVE | Noted: 2022-01-01

## 2022-03-23 NOTE — OP REPORT
DATE OF SERVICE:  03/23/2022     PREOPERATIVE DIAGNOSIS:  Large right inguinal hernia.     POSTOPERATIVE DIAGNOSIS:  Large right inguinal scrotal hernia containing   incarcerated small bowel and sigmoid colon.     PROCEDURE:  Robotic-assisted laparoscopic repair of right inguinal hernia with   mesh.     SURGEON:  Itzel Bennett MD     ANESTHESIOLOGIST:  Brenda Hutchinson MD     ANESTHESIA:  GET.     ASSISTANT:  Tigre Bronson PA-C.  The indications for surgical assistant in   this procedure was due to the complexity of the procedure.  The role of my   assistant included aiding in incisions, retraction, exchange of robotic   devices and closing of incisions.     ESTIMATED BLOOD LOSS:  10 mL.     SPECIMENS:  None.     DRAINS:  None.     PROPHYLAXIS:  IV Ancef, SCDs.     PROCEDURE IN DETAIL:  The patient was consented preoperatively, taken to the   operating room and placed in the supine position.  Anesthesia was induced and   he was endotracheally intubated without difficulty.  Prior to prepping the   patient, was able to partially reduce the right inguinal hernia, the contents   extended down into the scrotum.  The abdomen, right groin region was then   prepped and draped in the usual sterile fashion.  A timeout was performed   confirming patient identifiers, planned procedure and administration of   antibiotics just prior to incision.  An 8-mm transverse incision was made just   superior to the patient's right of the umbilicus.  While retracting the   abdominal wall anteriorly, Veress needle was placed in this position and the   abdomen was insufflated to 12 mmHg.  A camera port was then placed in this   position and 2 additional 8 mm trocars were placed lateral to the rectus   muscle.  The patient had small bowel adhesions to the anterior surface of the   abdominal wall and these were sharply taken down from the anterior abdominal   wall. The patient was then placed in 15 degrees Trendelenburg.  The robot   docked  and control was taken of the console.  Small bowel and sigmoid colon   were adhesed to the peritoneal orifice of the large right inguinal hernia.    The adhesions were taken down sharply and then beginning at the median   umbilical ligament peritoneal flap was created with sharp and blunt   dissection, working laterally to the anterior superior iliac spine.  The   patient had a very large indirect hernia with a scrotal hernia sac as well as   a large amount of preperitoneal fat extending. Mobilizing the peritoneum off   the cord structures along with the preperitoneal fat took approximately 40   minutes.  Once the cord structures were parietalized, the inferior epigastric   vessels were exposed to the pubic symphysis.  Toni's ligament was then   dissected free down to its junction with the iliac vein.  Dissection was   continued to the iliopubic tract with care taken to avoid injury to the   femoral branch of the genitofemoral nerve and lateral femoral cutaneous nerve.    A 10x15 piece of ProGrip mesh was then unrolled along the inferior aspect of   the working space to completely cover the direct, indirect and femoral   spaces.  After hemostasis was confirmed, the peritoneal flap was closed over   the mesh with a running 2-0 Stratafix suture.  Small bowel and sigmoid colon   that had been reduced were inspected carefully, there were no sign of serosal   injuries.  The pneumoperitoneum was then allowed to escape.  The 8-mm trocars   were removed and the skin closed with a subcuticular stitch using 4-0   Monocryl.  Dermabond was then placed overlying the incisions.  All lap, sponge   and instrument counts were correct at the end of the case x2.  The patient   tolerated the procedure well.  He was awakened from anesthesia, extubated, and   taken to the postanesthesia care unit in good condition.        ______________________________  MD DANIEL Najera/GAIL/OSWALDO    DD:  03/23/2022 15:14  DT:  03/23/2022  15:40    Job#:  [id number]

## 2022-03-23 NOTE — ANESTHESIA TIME REPORT
Anesthesia Start and Stop Event Times     Date Time Event    3/23/2022 1344 Anesthesia Start     1502 Anesthesia Stop        Responsible Staff  03/23/22    Name Role Begin End    Brenda Hutchinson M.D. Anesth 1344 1502        Preop Diagnosis (Free Text):  Pre-op Diagnosis     RIGHT INGUINAL HERNIA        Preop Diagnosis (Codes):    Premium Reason  Non-Premium    Comments:

## 2022-03-23 NOTE — ANESTHESIA PREPROCEDURE EVALUATION
Case: [id number] Date/Time: 03/23/22 1245    Procedure: REPAIR, HERNIA, INGUINAL, ROBOT-ASSISTED, USING DA ANDRA XI - LARGE (Right )    Pre-op diagnosis: RIGHT INGUINAL HERNIA    Location: [facility] OR 01 / SURGERY [facility]    Surgeons: Itzel Bennett M.D.          Relevant Problems   ANESTHESIA   (positive) DERREK (obstructive sleep apnea)      CARDIAC   (positive) Atrial fibrillation (HCC)   (positive) Deep vein thrombosis (DVT) of distal vein of lower extremity (HCC)   (positive) PAH (pulmonary artery hypertension) (HCC)   (positive) Pulmonary embolus (HCC)   (positive) Venous insufficiency of both lower extremities       Physical Exam    Airway   Mallampati: I  TM distance: >3 FB  Neck ROM: full       Cardiovascular   Rhythm: irregular  Rate: abnormal     Dental - normal exam           Pulmonary - normal exam  Breath sounds clear to auscultation     Abdominal   Abdomen: soft  Bowel sounds: normal   Neurological - normal exam                 Anesthesia Plan    ASA 3   ASA physical status 3 criteria: COPD, moderate reduction of ejection fraction and respiratory insufficiency or compromise    Plan - general       Airway plan will be ETT          Induction: intravenous    Postoperative Plan: Postoperative administration of opioids is intended.    Pertinent diagnostic labs and testing reviewed    Informed Consent:      Use of blood products discussed with: patient whom consented to blood products.

## 2022-03-23 NOTE — ANESTHESIA POSTPROCEDURE EVALUATION
Patient: Alfred Diaz    Procedure Summary     Date: 03/23/22 Room / Location:  OR  / SURGERY [facility]    Anesthesia Start: 1344 Anesthesia Stop: 1502    Procedure: REPAIR, HERNIA, INGUINAL, ROBOT-ASSISTED, USING DA ANDRA XI - LARGE (Right Abdomen) Diagnosis: (RIGHT INGUINAL HERNIA)    Surgeons: Itzel Bennett M.D. Responsible Provider: Brenda Hutchinson M.D.    Anesthesia Type: general ASA Status: 3          Final Anesthesia Type: general  Last vitals  BP   Blood Pressure : 136/79    Temp   36 °C (96.8 °F)    Pulse   (!) 112   Resp   20    SpO2   92 %      Anesthesia Post Evaluation    Patient location during evaluation: bedside  Patient participation: complete - patient cannot participate  Level of consciousness: responsive to light touch  Pain score: 0    Airway patency: patent  Anesthetic complications: no  Cardiovascular status: adequate  Respiratory status: acceptable  Hydration status: acceptable    PONV: none          There were no known complications for this encounter.     Nurse Pain Score: 0 (NPRS)

## 2022-03-23 NOTE — OR NURSING
1459 Patient to  PACU from OR  after report received  Oral airway removed on arrival to PACU    1539 patient resting quietly     1545 trying to reach Dr Bennett about patients orders. 1600 Dr Bennett in the OR she states patient will be admitted.     1600 patient updated on plan and progress  On 2l NC  Wife updated     1625 patient resting quietly ready to go to [facility] after meeting criteria     1630 report called to yury on [facility]       1702 patient taken to [facility] by transport

## 2022-03-23 NOTE — OR NURSING
1210: Brought patient back to pre-op and assumed care.  1335: Patient allergies and NPO status verified, home medication reconciliation completed and belongings secured. Patient verbalizes understanding of pain scale, expected course of stay and plan of care. Surgical site verified with patient. IV access established. Sequentials placed on legs.

## 2022-03-23 NOTE — ANESTHESIA PROCEDURE NOTES
Airway    Date/Time: 3/23/2022 1:49 PM  Performed by: Brenda Hutchinson M.D.  Authorized by: Brenda Hutchinson M.D.     Location:  OR  Urgency:  Elective  Difficult Airway: No    Indications for Airway Management:  Anesthesia      Spontaneous Ventilation: absent    Sedation Level:  Deep  Preoxygenated: Yes    Patient Position:  Sniffing  MILS Maintained Throughout: Yes    Mask Difficulty Assessment:  1 - vent by mask  Final Airway Type:  Endotracheal airway  Final Endotracheal Airway:  ETT  Cuffed: Yes    Technique Used for Successful ETT Placement:  Video laryngoscopy    Insertion Site:  Oral  Blade Type:  Nydia  Laryngoscope Blade/Videolaryngoscope Blade Size:  4  ETT Size (mm):  7.0  Measured from:  Lips  Placement Verified by: auscultation and capnometry    Cormack-Lehane Classification:  Grade I - full view of glottis  Number of Attempts at Approach:  1  Ventilation Between Attempts:  BVM  Number of Other Approaches Attempted:  1  Unsuccessful Approach(es) for ETT:  Direct laryngoscopy

## 2022-03-24 NOTE — PROGRESS NOTES
Received report from day shift nurse. Assumed pt care at 1915. Pt is A&Ox4, resting comfortably in bed. Pt on 2 lpm via nasal cannula. No signs of SOB/respiratory distress. Labs noted, VSS. Pt c/o no pain at this moment. Fall precautions in place. Bed at lowest position. Call light and personal belongings within reach. Continue to monitor

## 2022-03-24 NOTE — DISCHARGE INSTRUCTIONS
ACTIVITY: Rest and take it easy for the first 24 hours.  A responsible adult is recommended to remain with you during that time.  It is normal to feel sleepy.  We encourage you to not do anything that requires balance, judgment or coordination.    MILD FLU-LIKE SYMPTOMS ARE NORMAL. YOU MAY EXPERIENCE GENERALIZED MUSCLE ACHES, THROAT IRRITATION, HEADACHE AND/OR SOME NAUSEA.    FOR 24 HOURS DO NOT:  Drive, operate machinery or run household appliances.  Drink beer or alcoholic beverages.   Make important decisions or sign legal documents.    SPECIAL INSTRUCTIONS:             DIET: To avoid nausea, slowly advance diet as tolerated, avoiding spicy or greasy foods for the first day.  Add more substantial food to your diet according to your physician's instructions.  Babies can be fed formula or breast milk as soon as they are hungry.  INCREASE FLUIDS AND FIBER TO AVOID CONSTIPATION.    SURGICAL DRESSING/BATHING:  You may shower tomorrow but do not scurb the incision sites.     FOLLOW-UP APPOINTMENT:  A follow-up appointment should be arranged with your doctor; call to schedule.    You should CALL YOUR PHYSICIAN if you develop:  Fever greater than 101 degrees F.  Pain not relieved by medication, or persistent nausea or vomiting.  Excessive bleeding (blood soaking through dressing) or unexpected drainage from the wound.  Extreme redness or swelling around the incision site, drainage of pus or foul smelling drainage.  Inability to urinate or empty your bladder within 8 hours.  Problems with breathing or chest pain.    You should call 911 if you develop problems with breathing or chest pain.  If you are unable to contact your doctor or surgical center, you should go to the nearest emergency room or urgent care center.  Physician's telephone #: [phone]    If any questions arise, call your doctor.  If your doctor is not available, please feel free to call the Surgical Center at [phone].     A registered nurse  may call you a few days after your surgery to see how you are doing after your procedure.    MEDICATIONS: Resume taking daily medication.  Take prescribed pain medication with food.  If no medication is prescribed, you may take non-aspirin pain medication if needed.  PAIN MEDICATION CAN BE VERY CONSTIPATING.  Take a stool softener or laxative such as senokot, pericolace, or milk of magnesia if needed.    Prescription given.  Last pain medication given at ____________________.    If your physician has prescribed pain medication that includes Acetaminophen (Tylenol), do not take additional Acetaminophen (Tylenol) while taking the prescribed medication.    Depression / Suicide Risk    As you are discharged from this [facility] facility, it is important to learn how to keep safe from harming yourself.    Recognize the warning signs:  · Abrupt changes in personality, positive or negative- including increase in energy   · Giving away possessions  · Change in eating patterns- significant weight changes-  positive or negative  · Change in sleeping patterns- unable to sleep or sleeping all the time   · Unwillingness or inability to communicate  · Depression  · Unusual sadness, discouragement and loneliness  · Talk of wanting to die  · Neglect of personal appearance   · Rebelliousness- reckless behavior  · Withdrawal from people/activities they love  · Confusion- inability to concentrate     If you or a loved one observes any of these behaviors or has concerns about self-harm, here's what you can do:  · Talk about it- your feelings and reasons for harming yourself  · Remove any means that you might use to hurt yourself (examples: pills, rope, extension cords, firearm)  · Get professional help from the community (Mental Health, Substance Abuse, psychological counseling)  · Do not be alone:Call your Safe Contact- someone whom you trust who will be there for you.  · Call your local CRISIS HOTLINE 974-7120 or  207.636.7555  · Call your local Children's Mobile Crisis Response Team [place] (069) 361-7386 or www.[employer]deca  · Call the toll free National Suicide Prevention Hotlines   · National Suicide Prevention Lifeline 635-542-JQOH (4149)  · National Hope Line Network 800-SUICIDE (890-9271)      Discharge Instructions    Discharged to home by car with relative. Discharged via wheelchair, hospital escort: Yes.  Special equipment needed: Not Applicable    Be sure to schedule a follow-up appointment with your primary care doctor or any specialists as instructed.     Discharge Plan: Regular Diet Ok To Shower, keep incisions as dry as possible, do not submerge. Take over the counter stool softeners as needed for constipation No strenuous activity of lifting >20 lbs for six weeks. Follow up with Dr. Bennett in office next week    Influenza Vaccine Indication: Not indicated: Previously immunized this influenza season and > 8 years of age    I understand that a diet low in cholesterol, fat, and sodium is recommended for good health. Unless I have been given specific instructions below for another diet, I accept this instruction as my diet prescription.   Other diet: Regular    Special Instructions: None    · Is patient discharged on Warfarin / Coumadin?   No     Depression / Suicide Risk    As you are discharged from this [facility] facility, it is important to learn how to keep safe from harming yourself.    Recognize the warning signs:  · Abrupt changes in personality, positive or negative- including increase in energy   · Giving away possessions  · Change in eating patterns- significant weight changes-  positive or negative  · Change in sleeping patterns- unable to sleep or sleeping all the time   · Unwillingness or inability to communicate  · Depression  · Unusual sadness, discouragement and loneliness  · Talk of wanting to die  · Neglect of personal appearance   · Rebelliousness- reckless behavior  · Withdrawal  from people/activities they love  · Confusion- inability to concentrate     If you or a loved one observes any of these behaviors or has concerns about self-harm, here's what you can do:  · Talk about it- your feelings and reasons for harming yourself  · Remove any means that you might use to hurt yourself (examples: pills, rope, extension cords, firearm)  · Get professional help from the community (Mental Health, Substance Abuse, psychological counseling)  · Do not be alone:Call your Safe Contact- someone whom you trust who will be there for you.  · Call your local CRISIS HOTLINE 896-5750 or 370-143-1462  · Call your local Children's Mobile Crisis Response Team [place] (955) 186-2611 or www.[employer]  · Call the toll free National Suicide Prevention Hotlines   · National Suicide Prevention Lifeline 376-463-DNJD (5247)  · National Hope Line Network 800-SUICIDE (328-4232)    Laparoscopic Inguinal Hernia Repair, Adult, Care After  This sheet gives you information about how to care for yourself after your procedure. Your health care provider may also give you more specific instructions. If you have problems or questions, contact your health care provider.  What can I expect after the procedure?  After the procedure, it is common to have:  · Pain.  · Swelling and bruising around the incision area.  · Scrotal swelling, in men.  · Some fluid or blood draining from your incisions.  Follow these instructions at home:  Incision care  · Follow instructions from your health care provider about how to take care of your incisions. Make sure you:  ? Wash your hands with soap and water before you change your bandage (dressing). If soap and water are not available, use hand .  ? Change your dressing as told by your health care provider.  ? Leave stitches (sutures), skin glue, or adhesive strips in place. These skin closures may need to stay in place for 2 weeks or longer. If adhesive strip edges start to  loosen and curl up, you may trim the loose edges. Do not remove adhesive strips completely unless your health care provider tells you to do that.  · Check your incision area every day for signs of infection. Check for:  ? More redness, swelling, or pain.  ? More fluid or blood.  ? Warmth.  ? Pus or a bad smell.  · Wear loose, soft clothing while your incisions heal.  Driving  · Do not drive or use heavy machinery while taking prescription pain medicine.  · Do not drive for 24 hours if you were given a medicine to help you relax (sedative) during your procedure.  Activity  · Do not lift anything that is heavier than 10 lb (4.5 kg), or the limit that you are told, until your health care provider says that it is safe.  · Ask your health care provider what activities are safe for you. A lot of activity during the first week after surgery can increase pain and swelling. For 1 week after your procedure:  ? Avoid activities that take a lot of effort, such as exercise or sports.  ? You may walk and climb stairs as needed for daily activity, but avoid long walks or climbing stairs for exercise.  Managing pain and swelling    · Put ice on painful or swollen areas:  ? Put ice in a plastic bag.  ? Place a towel between your skin and the bag.  ? Leave the ice on for 20 minutes, 2-3 times a day.  General instructions  · Do not take baths, swim, or use a hot tub until your health care provider approves. Ask your health care provider if you may take showers. You may only be allowed to take sponge baths.  · Take over-the-counter and prescription medicines only as told by your health care provider.  · To prevent or treat constipation while you are taking prescription pain medicine, your health care provider may recommend that you:  ? Drink enough fluid to keep your urine pale yellow.  ? Take over-the-counter or prescription medicines.  ? Eat foods that are high in fiber, such as fresh fruits and vegetables, whole grains, and  beans.  ? Limit foods that are high in fat and processed sugars, such as fried and sweet foods.  · Do not use any products that contain nicotine or tobacco, such as cigarettes and e-cigarettes. If you need help quitting, ask your health care provider.  · Drink enough fluid to keep your urine pale yellow.  · Keep all follow-up visits as told by your health care provider. This is important.  Contact a health care provider if:  · You have more redness, swelling, or pain around your incisions or your groin area.  · You have more swelling in your scrotum.  · You have more fluid or blood coming from your incisions.  · Your incisions feel warm to the touch.  · You have severe pain and medicines do not help.  · You have abdominal pain or swelling.  · You cannot eat or drink without vomiting.  · You cannot urinate or pass a bowel movement.  · You faint.  · You feel dizzy.  · You have nausea and vomiting.  · You have a fever.  Get help right away if:  · You have pus or a bad smell coming from your incisions.  · You have chest pain.  · You have problems breathing.  Summary  · Pain, swelling, and bruising are common after the procedure.  · Check your incision area every day for signs of infection, such as more redness, swelling, or pain.  · Put ice on painful or swollen areas for 20 minutes, 2-3 times a day.  This information is not intended to replace advice given to you by your health care provider. Make sure you discuss any questions you have with your health care provider.  Document Released: 03/29/2018 Document Revised: 12/21/2018 Document Reviewed: 03/29/2018  Elsevier Patient Education © 2020 [employer]

## 2022-03-24 NOTE — PROGRESS NOTES
1700-Received report from PACU RN.   1732-  Pt arrived to floor via gurney then transferred to hospital bed.  Assumed care of pt.   Assessment and chart check complete.   Pt is AAOX4, no signs of distress, denies nausea/vomiting.   3 Abdominal lap stabs dermabond CDI.  Tolerating regular diet.  Fall precautions in place, treaded socks on pt, bed in low position.   Call light within reach.   Educated pt to call if needing anything.   Hourly rounding.

## 2022-03-24 NOTE — CARE PLAN
The patient is Stable - Low risk of patient condition declining or worsening    Shift Goals  Clinical Goals: void, ambulate  Patient Goals: pain score will remain 1/10    Progress made toward(s) clinical / shift goals:  Patient voided with adequate urine output and ambulated to the toilet. He didn't complain of any pain as of this time. Will continue to monitor    Patient is not progressing towards the following goals:N/a      Problem: Pain - Standard  Goal: Alleviation of pain or a reduction in pain to the patient’s comfort goal  Outcome: Progressing     Problem: Fall Risk  Goal: Patient will remain free from falls  Outcome: Progressing

## 2022-03-24 NOTE — CARE PLAN
The patient is Stable - Low risk of patient condition declining or worsening    Shift Goals  Clinical Goals: Pt pain will be at stated rate of 3/10  Patient Goals: for discharge today    Progress made toward(s) clinical / shift goals:  Pain is well controlled. Tolerating diet. Able to walk to bathroom.    Patient is not progressing towards the following goals:      Problem: Fall Risk  Goal: Patient will remain free from falls  Outcome: Progressing     Problem: Pain - Standard  Goal: Alleviation of pain or a reduction in pain to the patient’s comfort goal  Outcome: Progressing     Problem: Urinary Elimination  Goal: Establish and maintain regular urinary output  Outcome: Progressing     Problem: Mobility  Goal: Patient's capacity to carry out activities will improve  Outcome: Progressing     Problem: Discharge Barriers/Planning  Goal: Patient's continuum of care needs are met  Outcome: Progressing  Flowsheets (Taken 3/24/2022 1111)  Continuum of Care Needs:   Assessed for discharge barriers   Involved patient/family/support system in discharge process   Provided and explained discharge instructions

## 2022-03-24 NOTE — PROGRESS NOTES
POD 1  No issues overnight  Mild R. Inguinal pain with minimal swelling  Urinating without difficulty  VSS  Abdomen soft, non-tender      -d/c home this am  -post op care/instructions reviewed with patient

## 2022-03-24 NOTE — CARE PLAN
The patient is Stable - Low risk of patient condition declining or worsening    Shift Goals  Clinical Goals: Pt will be able to void after surgery  Patient Goals: Pt will have adeqaute pain control    Progress made toward(s) clinical / shift goals:  Awaiting to void. Instructed to increase oral intake.Pain is well controlled.    Patient is not progressing towards the following goals:        Problem: Pain - Post Surgery  Goal: Alleviation or reduction of pain post surgery  Outcome: Progressing     Problem: Knowledge Deficit - Standard  Goal: Patient and family/care givers will demonstrate understanding of plan of care, disease process/condition, diagnostic tests and medications  Outcome: Progressing     Problem: Fall Risk  Goal: Patient will remain free from falls  Outcome: Progressing

## 2022-03-24 NOTE — PROGRESS NOTES
Received report from night shift RN.   Assumed care of pt.   Assessment and chart check complete.   Pt is AAOX4, no signs of distress, denies nausea/vomiting.   3 Abdominal lap stabs dermabond CDI.  Able to walk to bathroom using FWW, steady gait.  Fall precautions in place, treaded socks on pt, bed in low position.   Call light within reach.   Educated pt to call if needing anything.   Hourly rounding

## 2022-03-24 NOTE — PROGRESS NOTES
Eyes Skin Assessment Completed by MATTHEW Casey and MATTHEW Carrasquillo.     Head WDL  Ears WDL  Nose WDL  Mouth WDL  Neck WDL  Breast/Chest WDL  Shoulder Blades WDL  Spine WDL  (R) Arm/Elbow/Hand WDL  (L) Arm/Elbow/Hand WDL  Abdomen 3 abdominal lap stabs dermabond  Groin Redness right groin  Scrotum/Coccyx/Buttocks WDL  (R) Leg Scab  (L) Leg Scab, brownish  (R) Heel/Foot/Toe Swelling  (L) Heel/Foot/Toe Swelling              Devices In Places Blood pressure and Pulse Ox        Interventions In Place N/A     Possible Skin Injury No     Pictures Uploaded Into Epic N/A  Wound Consult Placed N/A  RN Wound Prevention Protocol Ordered No

## 2022-03-24 NOTE — PROGRESS NOTES
Discharging patient home per MD order.  Pt demonstrated understanding of discharge instructions, follow up appointments, home medications, prescriptions, and home care for surgical wound. Pt is voiding without difficulty, pain well controlled, tolerating oral medications, O2 greater than 90%. Pt verbalized understanding of discharge instructions and educational handouts and all questions answered. PIV removed.  Pt discharged off unit with hospital escort.

## 2022-06-09 NOTE — PROGRESS NOTES
Chief Complaint   Patient presents with   • Follow-Up     Last seen 1/17/22    • Results     Pft 2/10/2, sniff 1/28/22, lung vent, cxr 2/3/22         HPI: This patient is a 82 y.o. male whom is followed in our clinic for RENEE last seen by me on 1/17/2022.  The patient's past medical history is significant for pulmonary embolism which occurred following laminectomy sometime in early 2011 [place] for which he was treated with anticoagulation and later stopped.  Subsequent to that he developed atrial fibrillation and has been on anticoagulation for the past 10 years.  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  Prior to our last clinic visit he was diagnosed with severe obstructive sleep apnea and has since been started on ASV.  He is having difficulty tolerating this.  He was referred to me for dyspnea on exertion that seem to occur after relocating from [facility] to the [facility] in spring 2020.  No chest pain, cough or wheezing.  He is a lifelong non-smoker.  An echocardiogram from 12/20 showed normal LV EF with indeterminate diastolic function, mildly dilated RV with RVSP of 45 mmHg.  IVC was dilated but did collapse with inspiration.  Chest x-ray from 8/2021 showed markedly elevated left hemidiaphragm.  We ordered sniff test which did not show paradoxical motion but did show decreased excursion of the left hemidiaphragm.  VQ scan showed no evidence of chronic thromboembolic disease.  Pulmonary function testing obtained in March showed restrictive defect with total lung capacity at 62% predicted, FVC of 66% predicted and DLCO at 93% predicted with overcorrection for VA to 169% predicted consistent with restrictive defect in the setting of decreased left hemidiaphragm function.  The patient continues to have shortness of breath that is stable, has not progressed or improved.  He does pool exercises 3 days/week due to DJD.    Past Medical History:    Diagnosis Date   • A-fib ([facility])    • Apnea, sleep    • Arthritis     osteo, bilateral knees, back   • Atrial fibrillation ([facility])    • Back pain    • Back pain    • Chickenpox    • Chronic anticoagulation    • Dental disorder     Full dentures   • DVT (deep venous thrombosis) ([facility])    • Eczema    • Falls    • Frequent urination    • [place] measles    • Hearing difficulty    • Hypertension    • Mumps    • DERREK (obstructive sleep apnea)    • PAH (pulmonary artery hypertension) ([facility])    • Painful joint     Bilateral knees   • PE (pulmonary thromboembolism) ([facility])    • Pulmonary embolism ([facility])    • Rash    • Shortness of breath    • Sleep apnea    • Snoring    • Swelling of lower extremity    • Venous insufficiency        Social History     Socioeconomic History   • Marital status:      Spouse name: Not on file   • Number of children: Not on file   • Years of education: Not on file   • Highest education level: Not on file   Occupational History   • Not on file   Tobacco Use   • Smoking status: Former Smoker     Packs/day: 0.00     Years: 3.00     Pack years: 0.00     Types: Cigars     Quit date: 1955     Years since quittin.6   • Smokeless tobacco: Never Used   Vaping Use   • Vaping Use: Never used   Substance and Sexual Activity   • Alcohol use: Not Currently     Alcohol/week: 4.2 oz     Types: 7 Standard drinks or equivalent per week   • Drug use: Never   • Sexual activity: Not on file   Other Topics Concern   • Not on file   Social History Narrative   • Not on file     Social Determinants of Health     Financial Resource Strain: Not on file   Food Insecurity: Not on file   Transportation Needs: Not on file   Physical Activity: Not on file   Stress: Not on file   Social Connections: Not on file   Intimate Partner Violence: Not on file   Housing Stability: Not on file       Family History   Problem Relation Age of Onset   • Cancer Mother    • Lung Disease Father        Current Outpatient Medications on  "File Prior to Visit   Medication Sig Dispense Refill   • potassium Chloride ER (K-TAB) 20 MEQ Tab CR tablet Take 1 Tablet by mouth every day. 90 Tablet 3   • furosemide (LASIX) 40 MG Tab Take 1 Tablet by mouth every day. 90 Tablet 3   • digoxin (LANOXIN) 250 MCG Tab Take 1 Tablet by mouth every day. 90 Tablet 3   • ELIQUIS 5 MG Tab Take 5 mg by mouth 2 Times a Day.     • DILTIAZem (CARDIZEM) 120 MG Tab Take 120 mg by mouth every day.     • Acetaminophen (TYLENOL 8 HOUR PO) Take  by mouth.     • traMADol (ULTRAM) 50 MG Tab Take 50 mg by mouth 2 times a day.       No current facility-administered medications on file prior to visit.       Vicodin [apap-fd&c yellow #10 al [place]rien-hydrocodone]      ROS:   Review of Systems   Constitutional: Negative for chills, diaphoresis, fever, malaise/fatigue and weight loss.   HENT: Negative for congestion, ear discharge, ear pain, hearing loss, nosebleeds, sinus pain, sore throat and tinnitus.    Eyes: Negative for blurred vision, double vision, photophobia, pain, discharge and redness.   Respiratory: Positive for shortness of breath. Negative for cough, hemoptysis, sputum production, wheezing and stridor.    Cardiovascular: Negative for chest pain, palpitations, orthopnea, claudication, leg swelling and PND.   Gastrointestinal: Negative for abdominal pain, constipation, diarrhea, heartburn, nausea and vomiting.   Genitourinary: Negative for dysuria and urgency.   Musculoskeletal: Positive for back pain and joint pain. Negative for falls, myalgias and neck pain.   Skin: Negative for itching and rash.   Neurological: Negative for dizziness, tremors, speech change, focal weakness, weakness and headaches.   Endo/Heme/Allergies: Negative for environmental allergies.   Psychiatric/Behavioral: Negative for depression.       /70 (BP Location: Right arm, Patient Position: Sitting, BP Cuff Size: Large adult)   Pulse 68   Resp 16   Ht 1.93 m (6' 4\")   Wt 105 kg (232 lb)   SpO2 94% "   Physical Exam  Vitals reviewed.   Constitutional:       General: He is not in acute distress.     Appearance: Normal appearance. He is normal weight.   HENT:      Head: Normocephalic and atraumatic.      Right Ear: External ear normal.      Left Ear: External ear normal.      Nose: Nose normal. No congestion.      Mouth/Throat:      Mouth: Mucous membranes are moist.      Pharynx: Oropharynx is clear. No oropharyngeal exudate.   Eyes:      General: No scleral icterus.     Extraocular Movements: Extraocular movements intact.      Conjunctiva/sclera: Conjunctivae normal.      Pupils: Pupils are equal, round, and reactive to light.   Cardiovascular:      Rate and Rhythm: Normal rate and regular rhythm.      Heart sounds: Normal heart sounds. No murmur heard.    No gallop.   Pulmonary:      Effort: Pulmonary effort is normal. No respiratory distress.      Breath sounds: Normal breath sounds. No wheezing or rales.   Abdominal:      General: There is no distension.      Palpations: Abdomen is soft.   Musculoskeletal:         General: Normal range of motion.      Cervical back: Normal range of motion and neck supple.      Right lower leg: Edema present.      Left lower leg: Edema present.      Comments: Bilateral lower extremity dependent edema   Skin:     General: Skin is warm and dry.      Coloration: Skin is not jaundiced.      Findings: No bruising or lesion.   Neurological:      Mental Status: He is alert and oriented to person, place, and time.      Cranial Nerves: No cranial nerve deficit.   Psychiatric:         Mood and Affect: Mood normal.         Behavior: Behavior normal.         PFTs as reviewed by me personally: As per HPI    Imaging as reviewed by me personally: As per HPI    Assessment:  1. Elevated hemidiaphragm  Spirometry   2. RENEE (dyspnea on exertion)  Spirometry   3. Restrictive lung disease  Spirometry   4. Permanent atrial fibrillation (HCC)     5. DERREK (obstructive sleep apnea)         Plan:  1.   Does not appear to be paralyzed but does have decreased function.  At this point I do not think the risk versus benefit of any invasive procedures such as plication would be worth pursuing. We did discuss pulmonary rehab but he would like to continue his pool activity. We will f/u with repeat spirometry in February to ensure not progressive  2. Presumed 2/2 restrictive lung disease 2/2 #1. Continue activity.   3. As per above.  4. Rate controlled on anticoagulation  5. Having difficult time tolerating and inquired about inspire. I explained to the pt that with severity of DERREK and likelihood that his diaphragm restriction is playing a large part, positive pressure will be needed to effectively treat him. Hopefully settings can adjusted with sleep to help with tolerance and compliance.   Return in about 8 months (around 2/9/2023) for spirometry same day as follow up.

## 2022-06-30 NOTE — PROGRESS NOTES
CHIEF COMPLAINT: Compliance check  1st Compliance  LAST SEEN: Dr. Amin on 3/10/2022  HISTORY OF PRESENT ILLNESS:  Alfred Diaz is a 82 y.o.male  who is here today to for his first compliance check using ASV and to request an evaluation for INSPIRE.. This patient has a hx of  atrial fibrillation that is chronic and persistent he is on chronic anticoagulation therapy.  He is also had deep vein thromboses as well as pulmonary embolus.  He has pulmonary hypertension and diastolic dysfunction.  He has venous insufficiency of bilateral lower extremities for which he uses compression stockings.  He has been referred here by his cardiologist as well as his general practitioner to reevaluate his sleep apnea and if necessary treated.  He reports that he had tried CPAP in the past and it was not to his liking.      9/8/21  Split Night    ASSESSMENT:  Diagnostic Centrals 15.5%  TREATMENT  With TREATMENT emergent Centrals  51.1     Severe obstructive sleep apnea hypopnea - AHI 61.7  Persistent nocturnal desaturation - susana saturation 79% - saturations <90% for 67.9% of the diagnostic recording  Treatment emergent central apnea  Inconsistent response to CPAP secondary to treatment emergent central apnea   RECOMMENDATION: Recommend a ResMed ASV titration.    He had a sleep study done with a dedicated ASV titration on 11/17/2021.  The results are as follows:    Impression:  1.  Obstructive sleep apnea   2.  Treatment emergent central apnea   3.  PLMS   Recommendations:  I recommend  ASV EPAP: 8, 10/3 cm H2O with large vitera mask. Recommended 30 day compliance download to assess the efficacy of the recommended pressure and compliance for further outpatient monitoring and management of CPAP therapy.      COMPLIANCE DATA: 93% compliant using greater than 4 hours daily  Machine type: ResMed ASV  Date range: May 1, 2022 through 6/29/2022  AHI: 0.3  TIME USED: 6 hours and 57 minutes  PRESSURE SETTINGS: EPAP 9 pressure support  3-10 cmH2O  LEAK: Intermittent    This patient is using PAP therapy consistently and is benefiting from it.  He is having difficulties with his mask fit.  Unfortunately his DME [employer] DID NOT perform a mask fit for him at the time he picked up his machine.  A mask fit will be ordered for him today to be done through a different DME.    .       Significant comorbidities and modifying factors: see HPI  PROBLEM LIST:  Patient Active Problem List    Diagnosis Date Noted   • Post procedure discomfort 03/23/2022   • Bilateral inguinal hernia without obstruction or gangrene 11/23/2021   • Fatigue 11/23/2021   • Mass of abdomen of single quadrant 09/13/2021   • Elevated MCV 08/16/2021   • Atrial fibrillation ([facility]) 07/07/2021   • Diastolic dysfunction 07/07/2021   • Venous insufficiency of both lower extremities 07/07/2021   • Chronic anticoagulation 07/07/2021   • Deep vein thrombosis (DVT) of distal vein of lower extremity ([facility]) 07/07/2021   • Pulmonary embolus ([facility]) 07/07/2021   • PAH (pulmonary artery hypertension) ([facility]) 07/07/2021   • DERREK (obstructive sleep apnea) 07/07/2021     PAST MEDICAL HISTORY:  Past Medical History:   Diagnosis Date   • A-fib ([facility])    • Apnea, sleep    • Arthritis     osteo, bilateral knees, back   • Atrial fibrillation ([facility])    • Back pain    • Back pain    • Chickenpox    • Chronic anticoagulation    • Dental disorder     Full dentures   • DVT (deep venous thrombosis) ([facility])    • Eczema    • Falls    • Frequent urination    • Ivorian measles    • Hearing difficulty    • Hypertension    • Mumps    • DERREK (obstructive sleep apnea)    • PAH (pulmonary artery hypertension) ([facility])    • Painful joint     Bilateral knees   • PE (pulmonary thromboembolism) ([facility])    • Pulmonary embolism ([facility])    • Rash    • Shortness of breath    • Sleep apnea    • Snoring    • Swelling of lower extremity    • Venous insufficiency       PAST SOCIAL HISTORY:  Past Surgical History:   Procedure Laterality Date   • NH  LAP,INGUINAL HERNIA REPR,INITIAL Right 3/23/2022    Procedure: REPAIR, HERNIA, INGUINAL, ROBOT-ASSISTED, USING DA [employer] - LARGE;  Surgeon: Itzel Bennett M.D.;  Location: SURGERY [facility];  Service: Gen Robotic   • ARTHROPLASTY Bilateral     knees   • ARTHROSCOPY, KNEE     • CATARACT EXTRACTION WITH IOL Bilateral    • INSERTION PERMANENT SPINAL CORD STIMULATOR     • LAMINOTOMY      lumbar   • TONSILLECTOMY     • TURP-VAPOR     • UROLOGY SURGERY       PAST FAMILY HISTORY:  Family History   Problem Relation Age of Onset   • Cancer Mother    • Lung Disease Father      SOCIAL HISTORY:  Social History     Socioeconomic History   • Marital status:      Spouse name: Not on file   • Number of children: Not on file   • Years of education: Not on file   • Highest education level: Not on file   Occupational History   • Not on file   Tobacco Use   • Smoking status: Former Smoker     Packs/day: 0.00     Years: 3.00     Pack years: 0.00     Types: Cigars     Quit date: 1955     Years since quittin.6   • Smokeless tobacco: Never Used   Vaping Use   • Vaping Use: Never used   Substance and Sexual Activity   • Alcohol use: Not Currently     Alcohol/week: 4.2 oz     Types: 7 Standard drinks or equivalent per week   • Drug use: Never   • Sexual activity: Not on file   Other Topics Concern   • Not on file   Social History Narrative   • Not on file     Social Determinants of Health     Financial Resource Strain: Not on file   Food Insecurity: Not on file   Transportation Needs: Not on file   Physical Activity: Not on file   Stress: Not on file   Social Connections: Not on file   Intimate Partner Violence: Not on file   Housing Stability: Not on file     ALLERGIES: Vicodin [apap-fd&c yellow #10 [place]-hydrocodone]  MEDICATIONS:  Current Outpatient Medications   Medication Sig Dispense Refill   • potassium Chloride ER (K-TAB) 20 MEQ Tab CR tablet Take 1 Tablet by mouth every day. 90 Tablet 3   • furosemide (LASIX)  "40 MG Tab Take 1 Tablet by mouth every day. 90 Tablet 3   • digoxin (LANOXIN) 250 MCG Tab Take 1 Tablet by mouth every day. 90 Tablet 3   • ELIQUIS 5 MG Tab Take 5 mg by mouth 2 Times a Day.     • DILTIAZem (CARDIZEM) 120 MG Tab Take 120 mg by mouth every day.     • Acetaminophen (TYLENOL 8 HOUR PO) Take  by mouth.     • traMADol (ULTRAM) 50 MG Tab Take 50 mg by mouth 2 times a day.       No current facility-administered medications for this visit.    \"CURRENT RX\"    REVIEW OF SYSTEMS:  Constitutional: Denies weight loss, endorses chronic daytime fatigue.  Eyes: Denies vision changes  Ears/Nose/Mouth/Throat: Denies rhinitis/nasal congestion, injury, decayed teeth/toothaches.  Cardiovascular: Denies chest pain, tightness, palpitations, swelling in legs/feet, difficulty breathing when lying down but gets better when sitting up.   Respiratory: Denies shortness of breath while awake,  Sleep: per HPI  Gastrointestinal: Denies  difficulty swallowing,  heartburn.  Genitourinary: REPORTS nocturia  Musculoskeletal: Denies painful joints, sore muscles, back pain.   Neurological: Denies frequent headaches,weakness, dizziness.    PHYSICAL EXAM/VITALS:  BP (!) 144/90 (BP Location: Left arm, Patient Position: Sitting, BP Cuff Size: Adult)   Pulse 73   Resp 14   Ht 1.93 m (6' 4\")   Wt 107 kg (235 lb)   SpO2 94%   BMI 28.61 kg/m²   Appearance: Well-nourished, well-developed,  looks stated age, no acute distress  Eyes:   EOMI  ENMT: MASKED   Neck: Supple, trachea midline  Respiratory effort:  No intercostal retractions or use of accessory muscles  Musculoskeletal:  Grossly normal; gait and station normal  Neurologic:  oriented to person, time, place, and purpose; judgement intact  Psychiatric:  No depression, anxiety, agitation    MEDICAL DECISION MAKING:  • The medical record was reviewed in its as pertains to this referral. This includes records from primary care, consultants notes,  referral request, hospital records, labs, " imaging. Any available diagnostic and titration nocturnal polysomnograms, home sleep apnea tests, continuous nocturnal oximetry results, multiple sleep latency tests, and compliance reports were reviewed with the patient.    ASSESSMENT/PLAN: This patient is interested in being referred to an ENT surgeon for an inspire evaluation.  I have contacted Mr. Estes who works for [employer] with a clinical question regarding central apneas.  Once he answers the question I will inform the patient as to whether or not he is a candidate for INSPIRE.  In the meantime he will undergo a proper mask fit through CPAP and more.  As the [employer] full facemask he is currently using does not fit him properly.    1. Complex sleep apnea syndrome  - DME Mask Fitting; Future  - DME Mask and Supplies    2. Difficulty ventilating with mask  - DME Mask Fitting; Future  - DME Mask and Supplies    • Has been advised to continue the current Pap Therapy.  Also advised to clean equipment frequently, and get new mask and supplies as allowed by insurance and DME.  Patient was advised to NEVER use  OZONE containing cleaning systems such as So Clean.  • The risks of untreated sleep apnea were discussed with the patient at length. Patients with DERREK are at increased risk of cardiovascular disease including coronary artery disease, systemic arterial hypertension, pulmonary arterial hypertension, cardiac arrhythmias, and stroke. DERREK patients have an increased risk of motor vehicle accidents, type 2 diabetes, chronic kidney disease, and non-alcoholic liver disease. The patient was advised to avoid driving a motor vehicle when drowsy.  • Have advised the patient to follow up with the appropriate healthcare practitioners for all other medical problems and issues.    RETURN TO CLINIC: Return in about 3 months (around 9/30/2022) for Compliance check.  My total time spent caring for the patient on the day of the encounter was 40minutes. This includes time time  spent on a thorough chart review including other physician notes, any type of sleep study, as well as critical labs and pulmonary and cardiac studies.  Additionally it includes discussions of good sleep hygiene, stimulus control and going over the need for consistency in terms of sleep preparation and practice.     Please note that this dictation was created using voice recognition software.  I have made every reasonable attempt to correct obvious errors, I expect that there are errors of grammar and possibly content that I did not discover before finalizing this note.

## 2022-07-11 PROBLEM — I63.9 STROKE DETERMINED BY CLINICAL ASSESSMENT (HCC): Status: ACTIVE | Noted: 2022-01-01

## 2022-07-11 PROBLEM — I63.511 ACUTE RIGHT ARTERIAL ISCHEMIC STROKE, MCA (MIDDLE CEREBRAL ARTERY) (HCC): Status: ACTIVE | Noted: 2022-01-01

## 2022-07-11 NOTE — PROGRESS NOTES
Brief Neurology Note    Interval History 7/11/2022: Patient underwent emergent mechanical thrombectomy of the right M1 occlusion with TICI 2b reperfusion by Dr. Madsen. Required a right carotid sheath placement for thrombectomy with planned surgical removal via cutdown in OR today with Dr. Robertson. Currently laying in bed, awakens to verbal stimuli, following commands, and oriented to self and place. Left gaze preference with some improvement, but otherwise persistent right MCA syndrome. Discussed the diagnosis, interventions, and plan of care with patient, wife, and daughter at bedside.     NIH Stroke Scale  7/11/22 1140    1a. Level of Consciousness (Alert, drowsy, etc): 1= Drowsy    1b. LOC Questions (Month, age): 1= Answers one correctly    1c. LOC Commands (Open/close eyes make fist/let go): 0= Obeys both correctly    2.   Best Gaze (Eyes open - patient follows examiner's finger on face): 1= Partial gaze palay    3.   Visual Fields (introduce visual stimulus/threat to patient's field quadrants): 2= Complete Hemianopia    4.   Facial Paresis (Show teeth, raise eyebrows and squeeze eyes shut): 2 = Partial     5a. Motor Arm - Left (Elevate arm to 90 degrees if patient is sitting, 45 degrees if  supine): 3= No effort against gravity    5b. Motor Arm - Right (Elevate arm to 90 degrees if patient is sitting, 45 degrees if supine): 0= No drift    6a. Motor Leg - Left (Elevate leg 30 degrees with patient supine): 3= No effort against gravity    6b. Motor Leg - Right  (Elevate leg 30 degrees with patient supine): 2= Can't resist gravity    7.   Limb Ataxia (Finger-nose, heel down shin): 0= No ataxia    8.   Sensory (Pin prick to face, arm, trunk and leg - compare side to side): 1= Partial loss    9.  Best Language (Name item, describe a picture and read sentences): 0= No aphasia    10. Dysarthria (Evaluate speech clarity by patient repeating listed words): 1= Mild to moderate slurring    11. Extinction and  Inattention (Use information from prior testing to identify neglect or  double simultaneous stimuli testing): 2= Complete neglect    Total NIH Score: 19    Updated recommendations  -Maintain systolic BP < 160 given TICI 2b reperfusion. This is in an effort to minimize reperfusion injury and/or hemorrhagic conversion. Antihypertensives per ICU team.     No further changes to plan as outlined in consult note earlier today, signed 7/11/22 08:09. Case discussed with Dr. Bronson, Dr. Aguila, and bedside RN.     Tremayne Maguire, MSN [facility]  Nurse Practitioner  [facility]  (t) [phone]

## 2022-07-11 NOTE — ANESTHESIA PROCEDURE NOTES
Airway    Date/Time: 7/11/2022 12:39 PM  Performed by: Benjamin Hong M.D.  Authorized by: Benjamin Hong M.D.     Location:  OR  Urgency:  Elective  Indications for Airway Management:  Anesthesia      Spontaneous Ventilation: absent    Sedation Level:  Deep  Preoxygenated: Yes    Patient Position:  Sniffing  Final Airway Type:  Endotracheal airway  Final Endotracheal Airway:  ETT  Cuffed: Yes    Technique Used for Successful ETT Placement:  Direct laryngoscopy    Insertion Site:  Oral  Blade Type:  Nydia  Laryngoscope Blade/Videolaryngoscope Blade Size:  3  ETT Size (mm):  8.0  Measured from:  Teeth  ETT to Teeth (cm):  24  Placement Verified by: auscultation and capnometry    Cormack-Lehane Classification:  Grade IIa - partial view of glottis  Number of Attempts at Approach:  1

## 2022-07-11 NOTE — ANESTHESIA POSTPROCEDURE EVALUATION
Patient: Alfred Diaz    Procedure Summary     Date: 07/11/22 Room / Location: [place] [address] / SURGERY [facility]    Anesthesia Start: 1233 Anesthesia Stop: 1335    Procedure: RIGHT NECK EXPLORATION; REMOVAL OF RIGHT CAROTID SHEATH (Right Neck) Diagnosis: (atherosclerosis)    Surgeons: Sebastian Robertson M.D. Responsible Provider: Benjamin Hong M.D.    Anesthesia Type: general ASA Status: 4 - Emergent          Final Anesthesia Type: general  Last vitals  BP   Blood Pressure : (!) 144/77    Temp   36.1 °C (96.9 °F)    Pulse   78   Resp   (!) 11    SpO2   95 %      Anesthesia Post Evaluation    Patient location during evaluation: PACU  Patient participation: complete - patient participated  Level of consciousness: awake and alert  Pain score: 0    Airway patency: patent  Anesthetic complications: no  Cardiovascular status: hemodynamically stable  Respiratory status: acceptable  Hydration status: euvolemic    PONV: none          No complications documented.     Nurse Pain Score: 0 (NPRS)

## 2022-07-11 NOTE — ED PROVIDER NOTES
"ED Provider Note    CHIEF COMPLAINT  No chief complaint on file.      HPI  Alfred Diaz is a 82 y.o. male who presents with altered mental status and diffuse left-sided weakness and a right sided lateral gaze deficits.  He was last seen normal at around midnight last night.  He does take Eliquis though has been off of it for the past \"few days\" as he ran out of the medication.  Patient appears oriented though has severe dysarthria.    REVIEW OF SYSTEMS  See HPI for further details. All other systems are negative.     PAST MEDICAL HISTORY   has a past medical history of A-fib (HCC), Apnea, sleep, Arthritis, Atrial fibrillation (HCC), Back pain, Back pain, Chickenpox, Chronic anticoagulation, Dental disorder, DVT (deep venous thrombosis) (HCC), Eczema, Falls, Frequent urination, Bengali measles, Hearing difficulty, Hypertension, Mumps, DERREK (obstructive sleep apnea), PAH (pulmonary artery hypertension) (HCC), Painful joint, PE (pulmonary thromboembolism) (HCC), Pulmonary embolism (HCC), Rash, Shortness of breath, Sleep apnea, Snoring, Swelling of lower extremity, and Venous insufficiency.    SOCIAL HISTORY  Social History     Tobacco Use   • Smoking status: Former Smoker     Packs/day: 0.00     Years: 3.00     Pack years: 0.00     Types: Cigars     Quit date: 1955     Years since quittin.7   • Smokeless tobacco: Never Used   Vaping Use   • Vaping Use: Never used   Substance and Sexual Activity   • Alcohol use: Not Currently     Alcohol/week: 4.2 oz     Types: 7 Standard drinks or equivalent per week   • Drug use: Never   • Sexual activity: Not on file       SURGICAL HISTORY   has a past surgical history that includes turp-vapor; arthroplasty (Bilateral); tonsillectomy; insertion permanent spinal cord stimulator; laminotomy; urology surgery; arthroscopy, knee; cataract extraction with iol (Bilateral); and lap,inguinal hernia repr,initial (Right, 3/23/2022).    CURRENT MEDICATIONS  Home Medications     " Reviewed by Chente Solis R.N. (Registered Nurse) on 07/11/22 at 1325  Med List Status: Not Addressed   Medication Last Dose Status   Acetaminophen (TYLENOL 8 HOUR PO)  Active   atorvastatin (LIPITOR) tablet 80 mg  Active   bisacodyl (DULCOLAX) suppository 10 mg  Active   bupivacaine-0.5%-epinephrine 1:[id number] PF (MARCAINE/SENSORCAINE) injection  Active   ceFAZolin (ANCEF) injection  Active   dexamethasone (DECADRON) injection  Active   dextrose 50% (D50W) injection 25 g  Active   digoxin (LANOXIN) 250 MCG Tab  Active   DILTIAZem (CARDIZEM) 120 MG Tab  Active   ELIQUIS 5 MG Tab  Active   fentaNYL (SUBLIMAZE) injection  Active   furosemide (LASIX) 40 MG Tab  Active   heparin OR USE ONLY  Active   hydrALAZINE (APRESOLINE) injection 10 mg  Active   hydrALAZINE (APRESOLINE) injection 10 mg  Active   insulin regular (HumuLIN R,NovoLIN R) injection  Active   ipratropium-albuterol (DUONEB) nebulizer solution  Active   ketorolac (TORADOL) injection  Active   labetalol (NORMODYNE/TRANDATE) injection 10 mg  Active   labetalol (NORMODYNE/TRANDATE) injection 10 mg  Active   Lactated Ringers  Active   lidocaine PF (XYLOCAINE-MPF) 2 % injection PF  Active   magnesium hydroxide (MILK OF MAGNESIA) suspension 30 mL  Active   Metoprolol Tartrate (LOPRESSOR) injection 5 mg  Active   midazolam (VERSED) injection  Active   niCARdipine (CARDENE) 25 mg in  mL Infusion  Active   ondansetron (ZOFRAN) syringe/vial injection  Active   polyethylene glycol/lytes (MIRALAX) PACKET 1 Packet  Active   potassium Chloride ER (K-TAB) 20 MEQ Tab CR tablet  Active   propofol (DIPRIVAN) injection  Active   rocuronium (ZEMURON) injection  Active   senna-docusate (PERICOLACE or SENOKOT S) 8.6-50 MG per tablet 2 Tablet  Active   thrombin (THROMBINAR) 5000 UNIT vial  Active   traMADol (ULTRAM) 50 MG Tab  Active                ALLERGIES  Allergies   Allergen Reactions   • Vicodin [Apap-Fd&C Yellow #10 [place]-Hydrocodone] Rash     Rash       PHYSICAL  "EXAM  VITAL SIGNS: /85   Pulse 83   Temp 36.1 °C (96.9 °F) (Temporal)   Resp 20   Ht 1.93 m (6' 4\")   Wt 105 kg (231 lb 7.7 oz)   SpO2 96%   BMI 28.18 kg/m²   Pulse ox interpretation: I interpret this pulse ox as normal.  Constitutional: Alert in no apparent distress.  HENT: No signs of trauma, Bilateral external ears normal, Nose normal.   Eyes: Right gaze deficit, Conjunctiva normal, Non-icteric.   Neck: Normal range of motion, Supple, No stridor.   Cardiovascular: Regular rate and rhythm.   Thorax & Lungs: Normal breath sounds, No respiratory distress, No wheezing.   Abdomen: Bowel sounds normal, Soft, No tenderness, No masses  Skin: Warm, Dry, No erythema, No rash.   Extremities: Intact distal pulses, No edema, No cyanosis  Musculoskeletal: Good range of motion in all major joints. No major deformities noted.   Neurologic: Alert, dysarthria, gaze deficit to the right, severe left-sided upper and lower extremity weakness with flaccid paralysis and facial droop to the left side.  Psychiatric: Affect normal, Judgment normal, Mood normal.       DIAGNOSTIC STUDIES / PROCEDURES    EKG - Physician interpretation  Results for orders placed or performed during the hospital encounter of 22   EKG (NOW)   Result Value Ref Range    Report       [facility]    Test Date:  2022  Pt Name:    SARAHI MEDINA               Department:   MRN:        [id number]                      Room:       [facility]  Gender:     Male                         Technician: KSHALLIE  :        [id number]                   Requested By:MICA POON  Order #:    [id number]                    Reading MD: Tommy Patel MD    Measurements  Intervals                                Axis  Rate:       85                           P:  OH:                                      QRS:        41  QRSD:       104                          T:          225  QT:         368  QTc:        438    Interpretive Statements  ATRIAL FIBRILLATION, " V-RATE    NONSPECIFIC ST-T WAVE ABNORMALITIES, INFEROLATERAL LEADS  Compared to ECG 03/09/2022 10:33:42  Ventricular premature complex(es) no longer present  Possible ischemia no longer present  Electronically Signed On 7- 14:06:08 PDT by Tommy Patel MD           LABS  Labs Reviewed   CBC WITH DIFFERENTIAL - Abnormal; Notable for the following components:       Result Value    MCHC 33.2 (*)     Neutrophils-Polys 75.20 (*)     Lymphocytes 16.30 (*)     All other components within normal limits    Narrative:     Indicate which anticoagulants the patient is on:->UNKNOWN   COMP METABOLIC PANEL - Abnormal; Notable for the following components:    Glucose 118 (*)     All other components within normal limits    Narrative:     Indicate which anticoagulants the patient is on:->UNKNOWN   PROTHROMBIN TIME    Narrative:     Indicate which anticoagulants the patient is on:->UNKNOWN   APTT    Narrative:     Indicate which anticoagulants the patient is on:->UNKNOWN   COD (ADULT)   TROPONIN    Narrative:     Indicate which anticoagulants the patient is on:->UNKNOWN   ABO RH CONFIRM   ESTIMATED GFR    Narrative:     Indicate which anticoagulants the patient is on:->UNKNOWN         RADIOLOGY  CT-CTA NECK WITH & W/O-POST PROCESSING   Final Result      CT angiogram of the neck within normal limits.      CT-CEREBRAL PERFUSION ANALYSIS   Final Result      1.Cerebral blood flow less than 30% likely representing completed infarct = 148 mL   2.T Max more than 6 seconds likely representing combination of completed infarct and ischemia = 0 mL   3. Mismatched volume likely representing ischemic brain/penumbra= 148 mL   4.Please note that the cerebral perfusion was performed on the limited brain tissue around the basal ganglia region. Infarct/ischemia outside the CT perfusion sections may not be seen on this study.      CT-CTA HEAD WITH & W/O-POST PROCESS   Final Result      RIGHT M1 occlusion      Findings were  communicated with and acknowledged by MICA POON via Vo[employer] Me on 7/11/2022 8:07 AM.      CT-HEAD W/O   Final Result      1. Hyperdense right MCA is suspicious for right MCA occlusion. Attention on CTA.   2. Slightly decreased gray-white differentiation in the right temporal lobe, which may be due to an acute infarct.   3. Mild global parenchymal atrophy. Chronic small vessel ischemic changes.      DX-CHEST-PORTABLE (1 VIEW)    (Results Pending)   IR-NEURO INTERVENTIONAL CONSULT-IP    (Results Pending)   MR-BRAIN-W/O    (Results Pending)         COURSE & MEDICAL DECISION MAKING    Medications   senna-docusate (PERICOLACE or SENOKOT S) 8.6-50 MG per tablet 2 Tablet ( Oral Automatically Held 7/18/22 1800)     And   polyethylene glycol/lytes (MIRALAX) PACKET 1 Packet ( Oral MAR Hold 7/11/22 1212)     And   magnesium hydroxide (MILK OF MAGNESIA) suspension 30 mL ( Oral MAR Hold 7/11/22 1212)     And   bisacodyl (DULCOLAX) suppository 10 mg ( Rectal MAR Hold 7/11/22 1212)   ipratropium-albuterol (DUONEB) nebulizer solution ( Nebulization MAR Hold 7/11/22 1212)   labetalol (NORMODYNE/TRANDATE) injection 10 mg ( Intravenous MAR Hold 7/11/22 1212)   hydrALAZINE (APRESOLINE) injection 10 mg ( Intravenous MAR Hold 7/11/22 1212)   atorvastatin (LIPITOR) tablet 80 mg ( Oral Automatically Held 7/18/22 1800)   insulin regular (HumuLIN R,NovoLIN R) injection ( Subcutaneous Automatically Held 7/18/22 1800)     And   dextrose 50% (D50W) injection 25 g ( Intravenous MAR Hold 7/11/22 1212)   Metoprolol Tartrate (LOPRESSOR) injection 5 mg ( Intravenous MAR Hold 7/11/22 1212)   labetalol (NORMODYNE/TRANDATE) injection 10 mg ( Intravenous MAR Hold 7/11/22 1212)   hydrALAZINE (APRESOLINE) injection 10 mg ( Intravenous MAR Hold 7/11/22 1212)   niCARdipine (CARDENE) 25 mg in  mL Infusion (0 mg/hr Intravenous Held 7/11/22 1115)   lactated ringers infusion (has no administration in time range)   fentaNYL (SUBLIMAZE) injection 25 mcg  (has no administration in time range)     Or   fentaNYL (SUBLIMAZE) injection 50 mcg (has no administration in time range)   HYDROmorphone (Dilaudid) injection 0.1 mg (has no administration in time range)     Or   HYDROmorphone (Dilaudid) injection 0.2 mg (has no administration in time range)     Or   HYDROmorphone (Dilaudid) injection 0.5 mg (has no administration in time range)   oxyCODONE (ROXICODONE) oral solution 5 mg (has no administration in time range)     Or   oxyCODONE (ROXICODONE) oral solution 10 mg (has no administration in time range)   meperidine (DEMEROL) injection 25 mg (has no administration in time range)   midazolam (Versed) injection 1 mg (has no administration in time range)   ondansetron (ZOFRAN) syringe/vial injection 4 mg (has no administration in time range)   diphenhydrAMINE (BENADRYL) injection 12.5 mg (has no administration in time range)   ipratropium-albuterol (DUONEB) nebulizer solution (has no administration in time range)   iohexol (OMNIPAQUE) 350 mg/mL (IV) (40 mL Intravenous Given 7/11/22 0830)   iohexol (OMNIPAQUE) 350 mg/mL (IV) (100 mL Intravenous Given 7/11/22 0830)   hydrALAZINE (APRESOLINE) injection 10 mg (10 mg Intravenous Given 7/11/22 0923)   ASPIRIN 300 MG IN SUPP (  Return to ADS 7/11/22 0945)   iohexol (OMNIPAQUE) 300 mg/mL (80 mL Intra-arterial Given 7/11/22 1100)       Pertinent Labs & Imaging studies reviewed. (See chart for details)  82 y.o. male presenting with left-sided weakness and dysarthria with right lateral gaze palsy.  Has a pronounced left facial droop and dysarthria.  Does not have obvious aphasia.  He was last seen normal at around midnight last night.  Was found to have the symptoms in the morning by family.  Patient presented as a stroke IR patient.  Patient went immediately to CT.  He was found to have an M1 occlusion consistent with the patient's presenting symptoms.  Patient was brought to IR for thrombectomy.  Patient was evaluated by neurology  "upon initial arrival to the emergency department as well.    I spoke with Dr. Aguila from the ICU service and she will hospitalize the patient after thrombectomy by neuro interventionalist.    The patient does not appear to be a candidate for tenecteplase given \"wake-up stroke\" with last known well at around midnight.    The total critical care time on this patient is 35 minutes, resuscitating patient, speaking with admitting physician, and deciphering test results. This 35 minutes is exclusive of separately billable procedures.      /74   Pulse 84   Temp 36.1 °C (96.9 °F) (Temporal)   Resp (!) 23   Ht 1.93 m (6' 4\")   Wt 105 kg (231 lb 7.7 oz)   SpO2 97%   BMI 28.18 kg/m²         FINAL IMPRESSION  1. Acute right arterial ischemic stroke, middle cerebral artery (MCA) (HCC)            Electronically signed by: Devang Luna M.D., 7/11/2022 7:55 AM    "

## 2022-07-11 NOTE — CONSULTS
ACUTE CARE VASCULAR SERVICE  CONSULT NOTE      Date: 7/11/2022    Referring Provider: Candice Aguila M.d.    Consulting Physician: Sebastian Robertson M.D. [facility]    -------------------------------------------------------------------------------------------------    Reason for consultation:  Removal of right carotid sheath    HPI:  This is a 82 y.o. male who presented with a stroke today and he was taken emergently to IR for thrombectomy procedure.  Their access was an 8 Khmer sheath in the right common carotid artery.  Once the procedure was complete they called me for surgical removal of the sheath.  When I came to see the patient he was extubated but somnolent and nonconversant still recovering from his procedure.    Past Medical History:   Diagnosis Date   • A-fib ([facility])    • Apnea, sleep    • Arthritis     osteo, bilateral knees, back   • Atrial fibrillation ([facility])    • Back pain    • Back pain    • Chickenpox    • Chronic anticoagulation    • Dental disorder     Full dentures   • DVT (deep venous thrombosis) ([facility])    • Eczema    • Falls    • Frequent urination    • Bulgarian measles    • Hearing difficulty    • Hypertension    • Mumps    • DERREK (obstructive sleep apnea)    • PAH (pulmonary artery hypertension) ([facility])    • Painful joint     Bilateral knees   • PE (pulmonary thromboembolism) ([facility])    • Pulmonary embolism ([facility])    • Rash    • Shortness of breath    • Sleep apnea    • Snoring    • Swelling of lower extremity    • Venous insufficiency        Past Surgical History:   Procedure Laterality Date   • MA LAP,INGUINAL HERNIA REPR,INITIAL Right 3/23/2022    Procedure: REPAIR, HERNIA, INGUINAL, ROBOT-ASSISTED, USING [employer];  Surgeon: Itzel Bennett M.D.;  Location: SURGERY [facility];  Service: Gen Robotic   • ARTHROPLASTY Bilateral     knees   • ARTHROSCOPY, KNEE     • CATARACT EXTRACTION WITH IOL Bilateral    • INSERTION PERMANENT SPINAL CORD STIMULATOR     • LAMINOTOMY       lumbar   • TONSILLECTOMY     • TURP-VAPOR     • UROLOGY SURGERY         Current Facility-Administered Medications   Medication Dose Route Frequency Provider Last Rate Last Admin   • [MAR Hold] senna-docusate (PERICOLACE or SENOKOT S) 8.6-50 MG per tablet 2 Tablet  2 Tablet Oral BID Candice Aguila M.D.        And   • [MAR Hold] polyethylene glycol/lytes (MIRALAX) PACKET 1 Packet  1 Packet Oral QDAY PRN Candice Aguila M.D.        And   • [MAR Hold] magnesium hydroxide (MILK OF MAGNESIA) suspension 30 mL  30 mL Oral QDAY PRN Candice Aguila M.D.        And   • [MAR Hold] bisacodyl (DULCOLAX) suppository 10 mg  10 mg Rectal QDAY PRN Candice Aguila M.D.       • [MAR Hold] ipratropium-albuterol (DUONEB) nebulizer solution  3 mL Nebulization Q4H PRN (RT) Candice Aguila M.D.       • [MAR Hold] labetalol (NORMODYNE/TRANDATE) injection 10 mg  10 mg Intravenous Q10 MIN PRN Candice Aguila M.D.       • [MAR Hold] hydrALAZINE (APRESOLINE) injection 10 mg  10 mg Intravenous Q2HRS PRN Candice Aguila M.D.       • [MAR Hold] atorvastatin (LIPITOR) tablet 80 mg  80 mg Oral Q EVENING Candice Aguila M.D.       • [MAR Hold] insulin regular (HumuLIN R,NovoLIN R) injection  1-6 Units Subcutaneous Q6HRS Candice Aguila M.D.        And   • [MAR Hold] dextrose 50% (D50W) injection 25 g  25 g Intravenous Q15 MIN PRN Candice Aguila M.D.       • [MAR Hold] Metoprolol Tartrate (LOPRESSOR) injection 5 mg  5 mg Intravenous Q6HRS PRN Candice Aguila M.D.       • [MAR Hold] labetalol (NORMODYNE/TRANDATE) injection 10 mg  10 mg Intravenous Q10 MIN PRN José Antonio Madsen M.D.       • [MAR Hold] hydrALAZINE (APRESOLINE) injection 10 mg  10 mg Intravenous Q2HRS PRN José Antonio Madsen M.D.       • niCARdipine (CARDENE) 25 mg in  mL Infusion  0-15 mg/hr Intravenous Continuous José Antonio Madsen M.D.   Held at 07/11/22 1115   • lactated ringers infusion   Intravenous Continuous Benjamin Hong M.D.       • fentaNYL  (SUBLIMAZE) injection 25 mcg  25 mcg Intravenous Q2 MIN PRN Benjamin Hong M.D.        Or   • fentaNYL (SUBLIMAZE) injection 50 mcg  50 mcg Intravenous Q2 MIN PRN Benjamin Hong M.D.       • HYDROmorphone (Dilaudid) injection 0.1 mg  0.1 mg Intravenous Q5 MIN PRN Benjamin Hong M.D.        Or   • HYDROmorphone (Dilaudid) injection 0.2 mg  0.2 mg Intravenous Q5 MIN PRN Benjamin Hong M.D.        Or   • HYDROmorphone (Dilaudid) injection 0.5 mg  0.5 mg Intravenous Q2 MIN PRN Benjamin Hong M.D.       • oxyCODONE (ROXICODONE) oral solution 5 mg  5 mg Oral Once PRN Benjamin Hong M.D.        Or   • oxyCODONE (ROXICODONE) oral solution 10 mg  10 mg Oral Once PRN Benjamin Hong M.D.       • meperidine (DEMEROL) injection 25 mg  25 mg Intravenous Q10 MIN PRN eBnjamin Hong M.D.       • midazolam (Versed) injection 1 mg  1 mg Intravenous Q2 MIN PRN Benjamin Hong M.D.       • ondansetron (ZOFRAN) syringe/vial injection 4 mg  4 mg Intravenous Once PRN Benjamin Hong M.D.       • diphenhydrAMINE (BENADRYL) injection 12.5 mg  12.5 mg Intravenous Q15 MIN PRN Benjamin Hong M.D.       • ipratropium-albuterol (DUONEB) nebulizer solution  3 mL Nebulization Q10 MIN PRN Benjamin Hong M.D.           Social History     Socioeconomic History   • Marital status:      Spouse name: Not on file   • Number of children: Not on file   • Years of education: Not on file   • Highest education level: Not on file   Occupational History   • Not on file   Tobacco Use   • Smoking status: Former Smoker     Packs/day: 0.00     Years: 3.00     Pack years: 0.00     Types: Cigars     Quit date: 1955     Years since quittin.7   • Smokeless tobacco: Never Used   Vaping Use   • Vaping Use: Never used   Substance and Sexual Activity   • Alcohol use: Not Currently     Alcohol/week: 4.2 oz     Types: 7 Standard drinks or equivalent per week   • Drug use: Never   • Sexual activity: Not on file   Other Topics  "Concern   • Not on file   Social History Narrative   • Not on file     Social Determinants of Health     Financial Resource Strain: Not on file   Food Insecurity: Not on file   Transportation Needs: Not on file   Physical Activity: Not on file   Stress: Not on file   Social Connections: Not on file   Intimate Partner Violence: Not on file   Housing Stability: Not on file       Family History   Problem Relation Age of Onset   • Cancer Mother    • Lung Disease Father        Allergies:  Vicodin [apap-fd&c yellow #10 [place]-hydrocodone]    Review of Systems:  Unable to obtain as the patient is somnolent and nonconversant recovering from a recent procedure    Physical Exam:  /74   Pulse 84   Temp 36.1 °C (96.9 °F) (Temporal)   Resp (!) 23   Ht 1.93 m (6' 4\")   Wt 105 kg (231 lb 7.7 oz)   SpO2 97%     Constitutional: Extubated, somnolent, nonconversant due to recent anesthesia  HEENT:  Normocephalic and atraumatic,   Neck:   Supple, no JVD,   Cardiovascular: Regular rate and rhythm,   Pulmonary:  Good air entry bilaterally,    Abdominal:  Soft, non-tender, non-distended     Aortic impulse not widened  Musculoskeletal: No edema, no tenderness  Neurological:  Patient is somnolent and not participating with exam at this time  Skin:   Skin is warm and dry. No rash noted.  Vascular:  Extremities warm and well perfused      Labs:  Recent Labs     07/11/22  0736   WBC 7.3   RBC 5.36   HEMOGLOBIN 16.9   HEMATOCRIT 50.9   MCV 95.0   MCH 31.5   MCHC 33.2*   RDW 49.8   PLATELETCT 166   MPV 10.9     Recent Labs     07/11/22  0736   SODIUM 138   POTASSIUM 3.8   CHLORIDE 104   CO2 24   GLUCOSE 118*   BUN 21   CREATININE 0.94   CALCIUM 8.6     Recent Labs     07/11/22  0736   APTT 28.9   INR 1.03     Recent Labs     07/11/22  0736   ASTSGOT 16   ALTSGPT 16   TBILIRUBIN 0.6   ALKPHOSPHAT 89   GLOBULIN 2.6   INR 1.03           Assessment/Plan:  -Stroke  -Right carotid sheath in place    An 8 [place]ian right carotid sheath was " placed today for intracranial thrombectomy procedure.  The procedure was complete and the sheath can be removed.  I had a detailed discussion with the patient's family regarding the recommendation for surgical removal.  We discussed the steps expected recovery and the potential risks.  Questions were answered and they agree to proceed.      Sebastian Robertson MD  [facility]  Voalte preferred. Otherwise text to cell [phone] or call my office [phone]  __________________________________________________________________  Patient:Alfred Diaz   MRN:[id number]   CSN:[id number]

## 2022-07-11 NOTE — ED NOTES
Stroke IR activation. BIB [facility] from home. LKW at midnight. Woke this morning with left sided weakness, left facial droop, right gaze, slurred speech. FSBS 118. Reports of /100s. Alert, answers questions appropriately with slurred speech and follows commands. Takes eliquis but ran out a few days ago. +Lower extremity edema. To CT scan with RN and neuro APRN.

## 2022-07-11 NOTE — PROGRESS NOTES
Pt arrived via IR team, neuro check complete, orders reviewed.     1100: family at bedside updated on POC  1115: Neurology at bedside  1130: Dr. Aguila at bedside   1140: Dr Robertson at beside  1215: Pt taken to Pre-op on monitor with family at bedside.

## 2022-07-11 NOTE — ASSESSMENT & PLAN NOTE
Chronic A. fib on Eliquis, digoxin, and diltiazem -currently rate controlled  Optimize electrolytes, continuous telemetry monitoring  Resume digoxin and diltiazem  Hold off on anticoagulation pending MRI brain and clearance by neurology

## 2022-07-11 NOTE — ASSESSMENT & PLAN NOTE
Patient woke with severe left-sided deficits, greater than 4.5 hours from last known normal, not tPA candidate  S/p thrombectomy 7/11 with TICI 2B flow, right carotid artery for access status post catheter removal 7/11 by vascular surgery  Goal blood pressure 120-160  Continue close neuro monitoring  Antiplatelet, statin at 24-hour vadim  PT/OT/rehab eval  Resume Eliquis when cleared by neurology  MRI brain pending  Echo pending

## 2022-07-11 NOTE — OP REPORT
ACUTE CARE VASCULAR SERVICE  OPERATIVE NOTE      Date of Service:         7/11/2022  Patient Name:            Alfred Diaz   Patient MRN:             [id number]     --------------------------------------------------------------------------------------------------    Preoperative Diagnosis:  Acute stroke, status post thrombectomy, right carotid sheath in place      Postoperative Diagnosis:  Same    Procedure:  Right neck exploration and removal of carotid sheath with primary closure of the artery      -------------------------------------------------------------------------------------------------    Surgeon:                                 Sebastian Robertson MD    Assistant:   none    Anesthesia:                             General endotracheal anesthesia    EBL:                                        minimal    Complications:                        none    Disposition:                             Tolerated well, sent to recovery in stable condition    -----------------------------------------------------------------------------------------------------    Procedure Summary:  The patient was properly identified in the preoperative area, taken to the operating room, and placed in a supine position where general endotracheal anesthesia was administered.  Intravenous antibiotics were administered by the anesthesiologist in the correct time interval.  The patient was prepped and draped in the usual sterile fashion.  Surgical timeout was called to identify the correct patient, procedure, and equipment.  Everyone was in agreement.    Local anesthetic was infiltrated along the anterior border of the right sternocleidomastoid overlying the sheath insertion point.  Longitudinal incision was made and we dissected down through the platysma using electrocautery and then we reflected the sternocleidomastoid muscle laterally following the course of the sheath.  We carefully made our way down to the anterior surface of the  carotid artery and we dissected proximally and distally and encircled this segment with Vesseloops.  Systemic heparinization was contraindicated due to the large acute stroke.  I did however clamped the common carotid artery proximally and then remove the sheath and allow the artery to backbleed through the arteriotomy and there was no evidence of thrombus.  The artery was occluded distally and then flushed antegrade and there was brisk pulsatile inflow with no evidence of thrombus.  The arteriotomy was closed with a 5-0 Prolene suture and then it was pressurized and found to be hemostatic and distal flow was restored.  The incision was irrigated and then topical hemostatic was applied and the incision was closed with multiple layers of absorbable suture and skin glue to protect the incision.    All sponge, instrument and needle counts were correct at the conclusion of the case.  Patient tolerated the well and was sent to recovery in guarded condition.    Sebastian Robertson MD  General and Vascular Surgery  [facility]  [phone]

## 2022-07-11 NOTE — ANESTHESIA PREPROCEDURE EVALUATION
Case: [id number] Date/Time: 07/11/22 1147    Procedure: ENDARTERECTOMY, CAROTID    Location: [facility] OR 10 / SURGERY [facility]    Surgeons: Sebastian Robertson M.D.          Relevant Problems   ANESTHESIA   (positive) DERREK (obstructive sleep apnea)      NEURO   (positive) Acute right arterial ischemic stroke, MCA (middle cerebral artery) (HCC)      CARDIAC   (positive) Atrial fibrillation (HCC)   (positive) Deep vein thrombosis (DVT) of distal vein of lower extremity (HCC)   (positive) PAH (pulmonary artery hypertension) (HCC)   (positive) Pulmonary embolus (HCC)   (positive) Venous insufficiency of both lower extremities       Physical Exam    Airway   Mallampati: II  TM distance: >3 FB  Neck ROM: full       Cardiovascular - normal exam  Rhythm: regular  Rate: normal  (-) murmur     Dental - normal exam           Pulmonary - normal exam  Breath sounds clear to auscultation     Abdominal    Neurological - normal exam                 Anesthesia Plan    ASA 4- EMERGENT       Plan - general       Airway plan will be ETT          Induction: intravenous    Postoperative Plan: Postoperative administration of opioids is intended.    Pertinent diagnostic labs and testing reviewed    Informed Consent:    Anesthetic plan and risks discussed with patient.    Use of blood products discussed with: patient whom consented to blood products.

## 2022-07-11 NOTE — CONSULTS
Critical Care Consultation    Date of consult: 7/11/2022    Referring Physician  Candice Aguila M.D.    Reason for Consultation  Stroke s/p thrombectomy    History of Presenting Illness  82 y.o. male who presented 7/11/2022 with history of A. fib DVT PE on Eliquis (has not taken for 2 days as he ran out of medicine), hypertension and DERREK presents with left-sided facial droop slurred speech and left-sided weakness when he awoke in the morning.  Neurology saw patient with dense right MCA syndrome with an NIH SS of 20, not a tPA candidate has greater than 4.5 hours and large area.  Did have MCA M1 segment thrombosis so going to IR for thrombectomy.     Code Status  Full Code   Will need to discuss with patient and family further as we see how he improves or does not after thrombectomy.  Started to have more in-depth conversation with patient and family after procedure , however neurology was coming in to do official evaluation post thrombectomy , and vascular surgery came preparing to do carotid takedown, initial stated full code , will try to have discussion with all present after patient's procedure today , for further discussions as patient prognosis becomes more evident , palliative care order placed    Review of Systems  ROS unable to assess due to significant neurologic deficits    Past Medical History   has a past medical history of A-fib (HCC), Apnea, sleep, Arthritis, Atrial fibrillation (HCC), Back pain, Back pain, Chickenpox, Chronic anticoagulation, Dental disorder, DVT (deep venous thrombosis) (HCC), Eczema, Falls, Frequent urination, Monegasque measles, Hearing difficulty, Hypertension, Mumps, DERREK (obstructive sleep apnea), PAH (pulmonary artery hypertension) (HCC), Painful joint, PE (pulmonary thromboembolism) (HCC), Pulmonary embolism (HCC), Rash, Shortness of breath, Sleep apnea, Snoring, Swelling of lower extremity, and Venous insufficiency.    Surgical History   has a past surgical history that  includes turp-vapor; arthroplasty (Bilateral); tonsillectomy; insertion permanent spinal cord stimulator; laminotomy; urology surgery; arthroscopy, knee; cataract extraction with iol (Bilateral); and pr lap,inguinal hernia repr,initial (Right, 3/23/2022).    Family History  family history includes Cancer in his mother; Lung Disease in his father.    Social History   reports that he quit smoking about 66 years ago. His smoking use included cigars. He smoked 0.00 packs per day for 3.00 years. He has never used smokeless tobacco. He reports previous alcohol use of about 4.2 oz of alcohol per week. He reports that he does not use drugs.    Medications  Home Medications    **Home medications have not yet been reviewed for this encounter**       Current Facility-Administered Medications   Medication Dose Route Frequency Provider Last Rate Last Admin   • senna-docusate (PERICOLACE or SENOKOT S) 8.6-50 MG per tablet 2 Tablet  2 Tablet Oral BID Candice Aguila M.D.        And   • polyethylene glycol/lytes (MIRALAX) PACKET 1 Packet  1 Packet Oral QDAY PRN Candice Aguila M.D.        And   • magnesium hydroxide (MILK OF MAGNESIA) suspension 30 mL  30 mL Oral QDAY PRN Candice Aguila M.D.        And   • bisacodyl (DULCOLAX) suppository 10 mg  10 mg Rectal QDAY PRN Candice Aguila M.D.       • ipratropium-albuterol (DUONEB) nebulizer solution  3 mL Nebulization Q4H PRN (RT) Candice Aguila M.D.       • labetalol (NORMODYNE/TRANDATE) injection 10 mg  10 mg Intravenous Q10 MIN PRN Candice Aguila M.D.       • hydrALAZINE (APRESOLINE) injection 10 mg  10 mg Intravenous Q2HRS PRN Candice Aguila M.D.       • atorvastatin (LIPITOR) tablet 80 mg  80 mg Oral Q EVENING Candice Aguila M.D.       • insulin regular (HumuLIN R,NovoLIN R) injection  1-6 Units Subcutaneous Q6HRS Candice Aguila M.D.        And   • dextrose 50% (D50W) injection 25 g  25 g Intravenous Q15 MIN PRN Candice Aguila M.D.       • Metoprolol  Tartrate (LOPRESSOR) injection 5 mg  5 mg Intravenous Q6HRS PRN Candice Aguila M.D.       • labetalol (NORMODYNE/TRANDATE) injection 10 mg  10 mg Intravenous Q10 MIN PRN José Antonio Madsen M.D.       • hydrALAZINE (APRESOLINE) injection 10 mg  10 mg Intravenous Q2HRS PRN José Antonio Madsen M.D.       • niCARdipine (CARDENE) 25 mg in  mL Infusion  0-15 mg/hr Intravenous Continuous José Antonio Madsen M.D.   Held at 07/11/22 1115       Allergies  Allergies   Allergen Reactions   • Vicodin [Apap-Fd&C Yellow #10 Al Aragon-Hydrocodone] Rash     Rash       Vital Signs last 24 hours  Pulse:  [] 75  Resp:  [] 23  BP: (112-209)/() 112/84  SpO2:  [92 %-97 %] 95 %    Physical Exam  Physical Exam   General: Drowsy will open eyes to stimuli, oriented and follows        NIH Stroke Scale    1a. Level of Consciousness (Alert, drowsy, etc): 1= Drowsy    1b. LOC Questions (Month, age): 0= Answers both correctly    1c. LOC Commands (Open/close eyes make fist/let go): 0= Obeys both correctly    2.   Best Gaze (Eyes open - patient follows examiner's finger on face): 1= Partial gaze palay    3.   Visual Fields (introduce visual stimulus/threat to patient's field quadrants): 2= Complete Hemianopia    4.   Facial Paresis (Show teeth, raise eyebrows and squeeze eyes shut): 2 = Partial     5a. Motor Arm - Left (Elevate arm to 90 degrees if patient is sitting, 45 degrees if  supine): 4= No movement    5b. Motor Arm - Right (Elevate arm to 90 degrees if patient is sitting, 45 degrees if supine): 0= No drift    6a. Motor Leg - Left (Elevate leg 30 degrees with patient supine): 3= No effort against gravity    6b. Motor Leg - Right  (Elevate leg 30 degrees with patient supine): 0= No drift    7.   Limb Ataxia (Finger-nose, heel down shin): 0= No ataxia    8.   Sensory (Pin prick to face, arm, trunk and leg - compare side to side): 1= Partial loss    9.  Best Language (Name item, describe a picture and read sentences): 0=  No aphasia    10. Dysarthria (Evaluate speech clarity by patient repeating listed words): 1= Mild to moderate slurring    11. Extinction and Inattention (Use information from prior testing to identify neglect or  double simultaneous stimuli testing): 1= Partial neglect    Total NIH Score: 14        Fluids  No intake or output data in the 24 hours ending 07/11/22 1140    Laboratory  Recent Results (from the past 48 hour(s))   CBC WITH DIFFERENTIAL    Collection Time: 07/11/22  7:36 AM   Result Value Ref Range    WBC 7.3 4.8 - 10.8 K/uL    RBC 5.36 4.70 - 6.10 M/uL    Hemoglobin 16.9 14.0 - 18.0 g/dL    Hematocrit 50.9 42.0 - 52.0 %    MCV 95.0 81.4 - 97.8 fL    MCH 31.5 27.0 - 33.0 pg    MCHC 33.2 (L) 33.7 - 35.3 g/dL    RDW 49.8 35.9 - 50.0 fL    Platelet Count 166 164 - 446 K/uL    MPV 10.9 9.0 - 12.9 fL    Neutrophils-Polys 75.20 (H) 44.00 - 72.00 %    Lymphocytes 16.30 (L) 22.00 - 41.00 %    Monocytes 7.00 0.00 - 13.40 %    Eosinophils 0.70 0.00 - 6.90 %    Basophils 0.50 0.00 - 1.80 %    Immature Granulocytes 0.30 0.00 - 0.90 %    Nucleated RBC 0.00 /100 WBC    Neutrophils (Absolute) 5.50 1.82 - 7.42 K/uL    Lymphs (Absolute) 1.19 1.00 - 4.80 K/uL    Monos (Absolute) 0.51 0.00 - 0.85 K/uL    Eos (Absolute) 0.05 0.00 - 0.51 K/uL    Baso (Absolute) 0.04 0.00 - 0.12 K/uL    Immature Granulocytes (abs) 0.02 0.00 - 0.11 K/uL    NRBC (Absolute) 0.00 K/uL   COMP METABOLIC PANEL    Collection Time: 07/11/22  7:36 AM   Result Value Ref Range    Sodium 138 135 - 145 mmol/L    Potassium 3.8 3.6 - 5.5 mmol/L    Chloride 104 96 - 112 mmol/L    Co2 24 20 - 33 mmol/L    Anion Gap 10.0 7.0 - 16.0    Glucose 118 (H) 65 - 99 mg/dL    Bun 21 8 - 22 mg/dL    Creatinine 0.94 0.50 - 1.40 mg/dL    Calcium 8.6 8.5 - 10.5 mg/dL    AST(SGOT) 16 12 - 45 U/L    ALT(SGPT) 16 2 - 50 U/L    Alkaline Phosphatase 89 30 - 99 U/L    Total Bilirubin 0.6 0.1 - 1.5 mg/dL    Albumin 3.8 3.2 - 4.9 g/dL    Total Protein 6.4 6.0 - 8.2 g/dL    Globulin  2.6 1.9 - 3.5 g/dL    A-G Ratio 1.5 g/dL   PROTHROMBIN TIME    Collection Time: 22  7:36 AM   Result Value Ref Range    PT 13.4 12.0 - 14.6 sec    INR 1.03 0.87 - 1.13   APTT    Collection Time: 22  7:36 AM   Result Value Ref Range    APTT 28.9 24.7 - 36.0 sec   COD (ADULT)    Collection Time: 22  7:36 AM   Result Value Ref Range    ABO Grouping Only A     Rh Grouping Only POS     Antibody Screen-Cod NEG    TROPONIN    Collection Time: 22  7:36 AM   Result Value Ref Range    Troponin T 16 6 - 19 ng/L   ESTIMATED GFR    Collection Time: 22  7:36 AM   Result Value Ref Range    GFR (CKD-EPI) 80 >60 mL/min/1.73 m 2   EKG (NOW)    Collection Time: 22 11:32 AM   Result Value Ref Range    Report       [facility]    Test Date:  2022  Pt Name:    SARAHI MEDINA               Department: ER  MRN:        [id number]                      Room:       [facility]  Gender:     Male                         Technician: ABDULAZIZ  :        [id number]                   Requested By:MICA POON  Order #:    [id number]                    Reading MD:    Measurements  Intervals                                Axis  Rate:       85                           P:  IA:                                      QRS:        41  QRSD:       104                          T:          225  QT:         368  QTc:        438    Interpretive Statements  ATRIAL FIBRILLATION, V-RATE    BORDERLINE REPOLARIZATION ABNORMALITY  Compared to ECG 2022 10:33:42  Ventricular premature complex(es) no longer present  Possible ischemia no longer present         Imaging  CT-CTA NECK WITH & W/O-POST PROCESSING   Final Result      CT angiogram of the neck within normal limits.      CT-CEREBRAL PERFUSION ANALYSIS   Final Result      1.Cerebral blood flow less than 30% likely representing completed infarct = 148 mL   2.T Max more than 6 seconds likely representing combination of completed infarct and ischemia = 0 mL   3. Mismatched  volume likely representing ischemic brain/penumbra= 148 mL   4.Please note that the cerebral perfusion was performed on the limited brain tissue around the basal ganglia region. Infarct/ischemia outside the CT perfusion sections may not be seen on this study.      CT-CTA HEAD WITH & W/O-POST PROCESS   Final Result      RIGHT M1 occlusion      Findings were communicated with and acknowledged by MICA POON via Voalte Me on 7/11/2022 8:07 AM.      CT-HEAD W/O   Final Result      1. Hyperdense right MCA is suspicious for right MCA occlusion. Attention on CTA.   2. Slightly decreased gray-white differentiation in the right temporal lobe, which may be due to an acute infarct.   3. Mild global parenchymal atrophy. Chronic small vessel ischemic changes.      DX-CHEST-PORTABLE (1 VIEW)    (Results Pending)   IR-NEURO INTERVENTIONAL CONSULT-IP    (Results Pending)   MR-BRAIN-W/O    (Results Pending)       Assessment/Plan  Acute right arterial ischemic stroke, MCA (middle cerebral artery) (HCC)  Assessment & Plan  - Patient woke with severe left-sided deficits, greater than 4.5 hours from last known normal  Not tPA candidate  Status post thrombectomy, went through carotid for access-plan for vascular to take to the OR for removal of sheath.   Carotid sheath needs heparin lock saline to avoid thrombosis  Tici 2b -goal blood pressure 1 20-1 60  Neuro checks per protocol, if worsening neurologic status repeat CT  Atorvastatin started  A1c and lipid  Antiplatelets  Atorvastatin  ST OT PT physiatry neurology consult  Anticoagulation per neuro, likely can start after MRI if no signs of bleeding    DERREK (obstructive sleep apnea)- (present on admission)  Assessment & Plan  CPAP at night if patient can tolerate    Pulmonary embolus (HCC)- (present on admission)  Assessment & Plan  History of DVT and PE, has been on Eliquis but missed last 2 doses  Will restart anticoagulation when able per neuro    Atrial fibrillation (HCC)- (present on  admission)  Assessment & Plan  Patient with A. fib was on Eliquis has not had for the last 2 days due to ran out of medications  On digoxin and Dilt  Unable to take p.o. at this time, swallow eval if fails need to discussed feeding tube to continue this medication  Metoprolol as needed for A. fib added, can change to schedule of patient having recurrence.   Will need to restart anticoagulation after MRI if no signs of bleeding per neuro.       Discussed patient condition and risk of morbidity and/or mortality with Family, RN, RT, Code status disscussed, Charge nurse / hot rounds and Patient.    The patient remains critically ill.  Critical care time = 40  minutes in directly providing and coordinating critical care and extensive data review.  No time overlap and excludes procedures.

## 2022-07-11 NOTE — THERAPY
Speech Therapy     Patient Name: Alfred Diaz  Age:  82 y.o., Sex:  male  Medical Record #: [id number]  Today's Date: 7/11/2022 07/11/22 1610   Interdisciplinary Plan of Care Collaboration   IDT Collaboration with  Nursing   Collaboration Comments Received order for swallow evaluation.  Spoke with RN, and patient is currently NPO, but is very sleepy following surgery today.  She indicated that tomorrow may be better for the evaluation.  SLP will follow tomorrow and complete evaluation as patient is able.

## 2022-07-11 NOTE — OR NURSING
1335: Pt arrived from OR post removal hardware from R carotid artery. Pt is asleep. R carotid sight is CDI and soft. Cardiac rhythm appears to be Afib.    1340: Arousable to RN voice, but drowsy; unable to open eyes at this time. Able to state name. Denies nausea or pain.    1400: Pt able to answer questions; see NIH stroke scale; no obvious changes from previous stroke scale.     1410: RN updated pt's daughter, Nell.     1418: Report to MATTHEW Walsh. Pt back to R106 via bed; monitor in place.

## 2022-07-11 NOTE — OR SURGEON
Immediate Post- Operative Note        Findings: Right M1  occlusion      Procedure(s): Mechanical thrombectomy .TICI 2b      Estimated Blood Loss: Less than 5 ml        Complications: None            7/11/2022     9:54 AM     José Antonio Madsen M.D.

## 2022-07-11 NOTE — PROGRESS NOTES
ACUTE CARE VASCULAR SERVICE  PROGRESS NOTE      8fr Right carotid sheath placed for stroke procedure today  IR procedure is now complete and will arrange for surgical removal via cutdown in the OR  OR notified and should be able to start the procedure in the next 1-2 hours  Patient remained heparinized after the procedure.    Sebastian Robertson MD  [facility]  Voalte preferred. Otherwise text to cell [phone] or call my office [phone]  __________________________________________________________________  Patient:Alfred Aden Diaz   MRN:[id number]   CSN:[id number]

## 2022-07-11 NOTE — ASSESSMENT & PLAN NOTE
History of DVT and PE, has been on Eliquis but missed last 2 doses  Will restart anticoagulation when able per neuro

## 2022-07-11 NOTE — ANESTHESIA TIME REPORT
Anesthesia Start and Stop Event Times     Date Time Event    7/11/2022 1201 Ready for Procedure     1233 Anesthesia Start     1335 Anesthesia Stop        Responsible Staff  07/11/22    Name Role Begin End    Benjamin Hong M.D. Anesth 1233 1335        Overtime Reason:  no overtime (within assigned shift)    Comments:

## 2022-07-11 NOTE — DISCHARGE PLANNING
[facility] Transitional Care Coordination    Referral from:  Dr Aguila  Insurance Provider on Facesheet: Medicare  Potential Rehab Diagnosis: Stroke    Chart review indicates patient may need on going medical management and may have therapy needs to possibly meet inpatient rehab facility criteria with the goal of returning to community.    D/C support: TBD     Physiatry consultation pended per protocol.     Stroke - pending therapy evals when appropriate. Physiatry consult pended, TCC will follow.     Thank you for the referral.

## 2022-07-11 NOTE — PROGRESS NOTES
IR Nursing Note      Cerebral Angiogram with Possible Intervention by MD Madsen assisted by RT Criss, Right Femoral Artery and Right Carotid Artery access sites.    Left femoral access site sealed with Angioseal, covered with gauze/tegaderm, C/D/I.  Left Carotid access with with 8Fr sheath sutures in place, covered with biopatch/gauze/tegaderm, to pressure bag with Hep/Saline solution.    Report given to MATTHEW Walsh.  Patient transported to R106 via IR RN and IR tech monitored then transferred care to report RN.    Angioseal VIP Vascular Access System, REF# [id number], LOT# [id number], LOT#

## 2022-07-11 NOTE — CONSULTS
Neurology STROKE CODE H&P  [facility]rology    Referring Physician: Devang Luna M.D.    STROKE CODE: Left facial droop, slurred speech, left sided weakness     To obtain the most accurate data regarding the time called, and time patient seen, refer to the stroke run-sheet and chart.  For time of CT, refer to the radiology report. See A&P below for TPA Decision and door to needle time if and when applicable.    HPI: Alfred Diaz is a 82 y.o. male with history of Afib on Eliquis (last dose approximately 2 days ago after running out of medication), DVT, PE, DERREK, eczema, and hypertension presenting to the hospital for left facial droop, slurred speech, and left sided weakness and consulted for acute stroke. The patient was last seen normal at 00:00 this morning and upon awakening his wife noted him to be slurring his speech with a left facial droop and left sided weakness. EMS was notified, alerted a Code Stroke pre-alert, and transferred the patient to [facility] ER. His wife notified EMS that he ran out of Eliquis and has not taken it in at least 2 days.  En route, initial /100 and FSBG 118 mg/dL. Upon arrival, neurological exam is significant for right gaze deviation, dysarthria, left facial droop, left sided hemiparesis, left sided numbness, and neglect (NIHSS 20). CT head wo contrast showed no acute intracranial abnormalities. CTA head and neck w/wo contrast showed a large vessel occlusion of the right MCA, M1 segment. CTP showed a favorable perfusion defect of the right MCA territory without a core infarct. Dr. Madsen, Neuro IR, was notified and accepted the patient for an emergent thrombectomy for endovascular clot retrieval.     Review of systems: In addition to what is detailed in the HPI above, all other systems reviewed and are negative.    Past Medical History:    has a past medical history of A-fib (HCC), Apnea, sleep, Arthritis, Atrial fibrillation (HCC), Back pain, Back pain,  Chickenpox, Chronic anticoagulation, Dental disorder, DVT (deep venous thrombosis) (HCC), Eczema, Falls, Frequent urination, Divehi measles, Hearing difficulty, Hypertension, Mumps, DERREK (obstructive sleep apnea), PAH (pulmonary artery hypertension) (HCC), Painful joint, PE (pulmonary thromboembolism) (HCC), Pulmonary embolism (HCC), Rash, Shortness of breath, Sleep apnea, Snoring, Swelling of lower extremity, and Venous insufficiency.    FHx:  family history includes Cancer in his mother; Lung Disease in his father.    SHx:   reports that he quit smoking about 66 years ago. His smoking use included cigars. He smoked 0.00 packs per day for 3.00 years. He has never used smokeless tobacco. He reports previous alcohol use of about 4.2 oz of alcohol per week. He reports that he does not use drugs.    Allergies:  Allergies   Allergen Reactions   • Vicodin [Apap-Fd&C Yellow #10 Al Aragon-Hydrocodone] Rash     Rash       Medications:  No current facility-administered medications for this encounter.    Current Outpatient Medications:   •  potassium Chloride ER (K-TAB) 20 MEQ Tab CR tablet, Take 1 Tablet by mouth every day., Disp: 90 Tablet, Rfl: 3  •  furosemide (LASIX) 40 MG Tab, Take 1 Tablet by mouth every day., Disp: 90 Tablet, Rfl: 3  •  digoxin (LANOXIN) 250 MCG Tab, Take 1 Tablet by mouth every day., Disp: 90 Tablet, Rfl: 3  •  ELIQUIS 5 MG Tab, Take 5 mg by mouth 2 Times a Day., Disp: , Rfl:   •  traMADol (ULTRAM) 50 MG Tab, Take 50 mg by mouth 2 times a day., Disp: , Rfl:   •  DILTIAZem (CARDIZEM) 120 MG Tab, Take 120 mg by mouth every day., Disp: , Rfl:   •  Acetaminophen (TYLENOL 8 HOUR PO), Take  by mouth., Disp: , Rfl:     Physical Examination:    Vitals:    07/11/22 0827 07/11/22 0832 07/11/22 0837 07/11/22 0842   BP: (!) 181/81 (!) 176/86 (!) 186/91 (!) 183/90   Pulse: 69 67 71 74   Resp: 18 16 16 16   SpO2: 94% 94% 94% 94%       General: Patient is awake and in no acute distress  Eyes: Examination of optic  disks not indicated at this time given acuity of consult.  CV: RRR, peripheral edema to BLE    NEUROLOGICAL EXAM:     Mental status: Drowsy, eyes open to verbal stimuli, fully oriented, and follows commands.  Speech and language: Speech is moderately dysarthric and fluent. The patient is able to name, repeat, and comprehend.  Cranial nerve exam: Pupils are equal, round and reactive to light bilaterally. Visual fields with left vision cut/neglect. Right gaze deviation, unable to overcome midline. Face is asymmetric with a left lower droop. Tongue is midline.  Motor exam: Strength is antigravity to RUE without drift appreciated, LUE flaccid, and unable to overcome gravity to BLE. Tone is normal. No abnormal movements were seen on exam.  Sensory exam: Decreased sensation to RUE and RLE with noxious stimuli compared to left.    Deep tendon reflexes:  Deferred  Coordination: No connor ataxia to LUE, unable to complete to RUE secondary to hemiplegia.   Gait: Deferred as patient was actively transported to CT scanner.     NIH Stroke Scale:    1a. Level of Consciousness (Alert, drowsy, etc): 1= Drowsy    1b. LOC Questions (Month, age): 0= Answers both correctly    1c. LOC Commands (Open/close eyes make fist/let go): 0= Obeys both correctly    2.   Best Gaze (Eyes open - patient follows examiner's finger on face): 2= Forced deviation    3.   Visual Fields (introduce visual stimulus/threat to patient's field quadrants): 2= Complete Hemianopia  4.   Facial Paresis (Show teeth, raise eyebrows and squeeze eyes shut): 2 = Partial     5a. Motor Arm - Left (Elevate arm to 90 degrees if patient is sitting, 45 degrees if  supine): 4= No movement    5b. Motor Arm - Right (Elevate arm to 90 degrees if patient is sitting, 45 degrees if supine): 0= No drift    6a. Motor Leg - Left (Elevate leg 30 degrees with patient supine): 3= No effort against gravity    6b. Motor Leg - Right  (Elevate leg 30 degrees with patient supine): 3= No effort  against gravity    7.   Limb Ataxia (Finger-nose, heel down shin): 0= No ataxia    8.   Sensory (Pin prick to face, arm, trunk and leg - compare side to side): 1= Partial loss    9.  Best Language (Name item, describe a picture and read sentences): 0= No aphasia    10. Dysarthria (Evaluate speech clarity by patient repeating listed words): 1= Mild to moderate slurring    11. Extinction and Inattention (Use information from prior testing to identify neglect or  double simultaneous stimuli testing): 1= Partial neglect    Total NIH Score: 20      Pre-stroke Modified [place] Scale (MRS): 1 = No significant disability, despite symptoms; able to perform all usual duties and activities      Objective Data:    Labs:  Lab Results   Component Value Date/Time    PROTHROMBTM 13.4 07/11/2022 07:36 AM    INR 1.03 07/11/2022 07:36 AM      Lab Results   Component Value Date/Time    WBC 7.3 07/11/2022 07:36 AM    RBC 5.36 07/11/2022 07:36 AM    HEMOGLOBIN 16.9 07/11/2022 07:36 AM    HEMATOCRIT 50.9 07/11/2022 07:36 AM    MCV 95.0 07/11/2022 07:36 AM    MCH 31.5 07/11/2022 07:36 AM    MCHC 33.2 (L) 07/11/2022 07:36 AM    MPV 10.9 07/11/2022 07:36 AM    NEUTSPOLYS 75.20 (H) 07/11/2022 07:36 AM    LYMPHOCYTES 16.30 (L) 07/11/2022 07:36 AM    MONOCYTES 7.00 07/11/2022 07:36 AM    EOSINOPHILS 0.70 07/11/2022 07:36 AM    BASOPHILS 0.50 07/11/2022 07:36 AM      Lab Results   Component Value Date/Time    SODIUM 138 07/11/2022 07:36 AM    POTASSIUM 3.8 07/11/2022 07:36 AM    CHLORIDE 104 07/11/2022 07:36 AM    CO2 24 07/11/2022 07:36 AM    GLUCOSE 118 (H) 07/11/2022 07:36 AM    BUN 21 07/11/2022 07:36 AM    CREATININE 0.94 07/11/2022 07:36 AM      Lab Results   Component Value Date/Time    CHOLSTRLTOT 206 (H) 08/20/2021 08:35 AM     (H) 08/20/2021 08:35 AM    HDL 47 08/20/2021 08:35 AM    TRIGLYCERIDE 55 08/20/2021 08:35 AM       Lab Results   Component Value Date/Time    ALKPHOSPHAT 89 07/11/2022 07:36 AM    ASTSGOT 16 07/11/2022  07:36 AM    ALTSGPT 16 07/11/2022 07:36 AM    TBILIRUBIN 0.6 07/11/2022 07:36 AM        Imaging/Testing:    I interpreted and/or reviewed the patient's neuroimaging    CT-CTA NECK WITH & W/O-POST PROCESSING   Final Result      CT angiogram of the neck within normal limits.      CT-CEREBRAL PERFUSION ANALYSIS   Final Result      1.Cerebral blood flow less than 30% likely representing completed infarct = 148 mL   2.T Max more than 6 seconds likely representing combination of completed infarct and ischemia = 0 mL   3. Mismatched volume likely representing ischemic brain/penumbra= 148 mL   4.Please note that the cerebral perfusion was performed on the limited brain tissue around the basal ganglia region. Infarct/ischemia outside the CT perfusion sections may not be seen on this study.      CT-CTA HEAD WITH & W/O-POST PROCESS   Final Result      RIGHT M1 occlusion      Findings were communicated with and acknowledged by MICA POON via [employer] on 7/11/2022 8:07 AM.      CT-HEAD W/O   Final Result      1. Hyperdense right MCA is suspicious for right MCA occlusion. Attention on CTA.   2. Slightly decreased gray-white differentiation in the right temporal lobe, which may be due to an acute infarct.   3. Mild global parenchymal atrophy. Chronic small vessel ischemic changes.      DX-CHEST-PORTABLE (1 VIEW)    (Results Pending)   IR-NEURO INTERVENTIONAL CONSULT-IP    (Results Pending)       Presumed Mechanism by TOAST:  Cardioembolic      Assessment and Plan:    Alfred Diaz is a 82 y.o. male with relevant history of Afib on Eliquis (last dose approximately 2 days ago after running out of medication), DVT, PE, DERREK, and hypertension presenting to the hospital for left facial droop, slurred speech, and left sided weakness for which neurology was consulted for acute stroke. Neurological exam is significant for a dense right MCA syndrome (NIHSS 20). CT head wo contrast showed no acute hemorrhage with hyperdense right MCA  and subtle decreased gray-white matter differentiation. CTA head and neck w/wo contrast showed a large vessel occlusion of the right MCA, M1 segment. CTP showed a favorable perfusion defect of the right MCA territory without a core infarct. Dr. Madsen, Neuro IR, was notified and accepted the patient for an emergent thrombectomy for endovascular clot retrieval. The patient was not a IV thrombolytic candidate as last known well was greater than 4.5 hours with unknown onset time of symptoms with patient awakening with right MCA syndrome. Etiology is most likely cardioembolic given his Afib history off anticoagulation (Eliquis).     Plan:    -Admit to ICU per post thrombectomy protocol.   -Neuro assessment and VS per post thrombectomy protocol.   After the endovascular intervention, please follow the following recommendations regarding blood pressure parameters:   If TICI 3: maintain systolic BP < 140   If TICI 2b: maintain systolic BP < 160   If TICI 2a or less, maintain systolic BP < 180 per tPA protocol  *This is in an effort to minimize reperfusion injury and/or hemorrhagic conversion. Antihypertensives per ICU team.   -Obtain MRI Brain wo contrast. No need to await 24 hours as patient did not receive IV thrombolytics  -Hold anticoagulation pending MRI brain imaging to evaluate for stroke burden and/or hemorrhagic conversion to guide timing to restart anticoagulation.   -Telemetry; currently Afib. Known history of Afib/arrhythmia.   -Obtain TTE   -Atorvastatin 80 mg PO q HS for LDL goal <70. Check lipid panel.   -BG management per primary team. BG goal . Check hemoglobin A1c, goal <7%   -PT/OT/SLP eval and treat.   -Will follow up with results of the above and make additional recommendations accordingly.    -DVT PPX: SCDs.      The evaluation of the patient, and recommended management, was discussed with Dr. Bronson, Dr. Luna, Dr. Madsen, Aixa Lu, APRN, and bedside RN.    Tremayne Maguire, MSN  AGACNP-BC  Nurse Practitioner  [facility]  (t) [phone]

## 2022-07-12 NOTE — PROGRESS NOTES
"Critical Care Progress Note    Date of admission  7/11/2022    Chief Complaint  82 y.o. male admitted 7/11/2022 with CVA    Hospital Course  \"82 y.o. male who presented 7/11/2022 with history of A. fib DVT PE on Eliquis (has not taken for 2 days as he ran out of medicine), hypertension and DERREK presents with left-sided facial droop slurred speech and left-sided weakness when he awoke in the morning.  Neurology saw patient with dense right MCA syndrome with an NIH SS of 20, not a tPA candidate has greater than 4.5 hours and large area.  Did have MCA M1 segment thrombosis so going to IR for thrombectomy.\" - Dr. Aguila 7/11      Interval Problem Update  Reviewed last 24 hour events:   - underwent mechanical thrombectomy of R M1 occlusion with TICI 2 b flow followed by carotid sheath removal in OR with vascular surgery   - AF, WBC up to 12   - CPAP qhs   - SCDs only   - CT head stable   - AAOx4, persistent L sided weakness, facial droop, and slurred speech   - NSR, -140   - glucose in goal   - ok UOP    Review of Systems  Review of Systems   Constitutional: Negative for chills, fever and malaise/fatigue.   HENT: Negative for congestion and sore throat.    Eyes: Negative for blurred vision.   Respiratory: Negative for cough, sputum production and shortness of breath.    Cardiovascular: Negative for chest pain, palpitations and leg swelling.   Gastrointestinal: Negative for abdominal pain, nausea and vomiting.   Genitourinary: Negative for dysuria.   Musculoskeletal: Positive for neck pain. Negative for back pain and myalgias.   Skin: Negative for rash.   Neurological: Positive for sensory change, speech change and focal weakness. Negative for dizziness and headaches.   Endo/Heme/Allergies: Does not bruise/bleed easily.   Psychiatric/Behavioral: Negative for depression. The patient is not nervous/anxious.    All other systems reviewed and are negative.       Vital Signs for last 24 hours   Temp:  [35.8 °C (96.4 " °F)-36.4 °C (97.6 °F)] 36 °C (96.8 °F)  Pulse:  [] 66  Resp:  [] 11  BP: (112-209)/() 132/83  SpO2:  [92 %-97 %] 96 %    Respiratory Information for the last 24 hours    2 lpm n/c    Physical Exam   Physical Exam  Vitals and nursing note reviewed.   Constitutional:       General: He is awake. He is not in acute distress.     Appearance: He is well-developed and normal weight. He is not diaphoretic.      Interventions: Nasal cannula in place.   HENT:      Head: Normocephalic and atraumatic.      Nose: Nose normal. No congestion.      Mouth/Throat:      Mouth: Mucous membranes are dry.      Pharynx: Oropharynx is clear. No oropharyngeal exudate.   Eyes:      General: No scleral icterus.     Extraocular Movements: Extraocular movements intact.      Conjunctiva/sclera: Conjunctivae normal.      Pupils: Pupils are equal, round, and reactive to light.   Neck:      Comments: Surgical incision on the right side of neck is clean/dry/intact without hematoma  Cardiovascular:      Rate and Rhythm: Normal rate and regular rhythm.      Pulses: Normal pulses.      Heart sounds: Normal heart sounds. No murmur heard.  Pulmonary:      Effort: Pulmonary effort is normal. No respiratory distress.      Breath sounds: No stridor. No rhonchi.      Comments: Slurred speech but protecting airway and speaking without respiratory difficulty  Abdominal:      General: Bowel sounds are normal. There is no distension.      Palpations: Abdomen is soft.      Tenderness: There is no abdominal tenderness. There is no guarding.   Musculoskeletal:         General: No tenderness.      Cervical back: Neck supple. No rigidity.      Right lower leg: No edema.      Left lower leg: No edema.   Skin:     General: Skin is warm and dry.      Capillary Refill: Capillary refill takes less than 2 seconds.      Coloration: Skin is not pale.   Neurological:      Mental Status: He is alert and oriented to person, place, and time.      Comments: Left  facial droop, left upper extremity with slight withdrawal to pain, left lower extremity unable to lift against gravity, sensation intact, slurred speech   Psychiatric:         Mood and Affect: Mood normal.         Behavior: Behavior is cooperative.         Medications  Current Facility-Administered Medications   Medication Dose Route Frequency Provider Last Rate Last Admin   • senna-docusate (PERICOLACE or SENOKOT S) 8.6-50 MG per tablet 2 Tablet  2 Tablet Oral BID Candice Aguila M.D.        And   • polyethylene glycol/lytes (MIRALAX) PACKET 1 Packet  1 Packet Oral QDAY PRN Candice Aguila M.D.        And   • magnesium hydroxide (MILK OF MAGNESIA) suspension 30 mL  30 mL Oral QDAY PRN Candice Aguila M.D.        And   • bisacodyl (DULCOLAX) suppository 10 mg  10 mg Rectal QDAY PRN Candice Aguila M.D.       • ipratropium-albuterol (DUONEB) nebulizer solution  3 mL Nebulization Q4H PRN (RT) Candice Aguila M.D.       • labetalol (NORMODYNE/TRANDATE) injection 10 mg  10 mg Intravenous Q10 MIN PRN Candice Aguila M.D.       • hydrALAZINE (APRESOLINE) injection 10 mg  10 mg Intravenous Q2HRS PRN Candice Aguila M.D.       • atorvastatin (LIPITOR) tablet 80 mg  80 mg Oral Q EVENING Candice Aguila M.D.       • insulin regular (HumuLIN R,NovoLIN R) injection  1-6 Units Subcutaneous Q6HRS Candice Aguila M.D.   1 Units at 07/11/22 2355    And   • dextrose 50% (D50W) injection 25 g  25 g Intravenous Q15 MIN PRN Candice Aguila M.D.       • Metoprolol Tartrate (LOPRESSOR) injection 5 mg  5 mg Intravenous Q6HRS PRN Candice Aguila M.D.       • labetalol (NORMODYNE/TRANDATE) injection 10 mg  10 mg Intravenous Q10 MIN PRN José Antonio Madsen M.D.       • hydrALAZINE (APRESOLINE) injection 10 mg  10 mg Intravenous Q2HRS PRN José Antonio Madsen M.D.       • niCARdipine (CARDENE) 25 mg in  mL Infusion  0-15 mg/hr Intravenous Continuous José Antonio Madsen M.D.   Held at 07/11/22 3796        Fluids    Intake/Output Summary (Last 24 hours) at 7/12/2022 0659  Last data filed at 7/11/2022 1800  Gross per 24 hour   Intake 400 ml   Output 710 ml   Net -310 ml       Laboratory          Recent Labs     07/11/22 0736 07/12/22  0238   SODIUM 138 142   POTASSIUM 3.8 4.2   CHLORIDE 104 106   CO2 24 22   BUN 21 18   CREATININE 0.94 0.77   MAGNESIUM  --  1.8   PHOSPHORUS  --  3.8   CALCIUM 8.6 8.3*     Recent Labs     07/11/22  0736 07/12/22  0238   ALTSGPT 16  --    ASTSGOT 16  --    ALKPHOSPHAT 89  --    TBILIRUBIN 0.6  --    GLUCOSE 118* 154*     Recent Labs     07/11/22 0736 07/12/22 0238   WBC 7.3 11.9*   NEUTSPOLYS 75.20*  --    LYMPHOCYTES 16.30*  --    MONOCYTES 7.00  --    EOSINOPHILS 0.70  --    BASOPHILS 0.50  --    ASTSGOT 16  --    ALTSGPT 16  --    ALKPHOSPHAT 89  --    TBILIRUBIN 0.6  --      Recent Labs     07/11/22 0736 07/12/22 0238   RBC 5.36 5.31   HEMOGLOBIN 16.9 16.9   HEMATOCRIT 50.9 49.8   PLATELETCT 166 191   PROTHROMBTM 13.4  --    APTT 28.9  --    INR 1.03  --        Imaging  X-Ray:  I have personally reviewed the images and compared with prior images. and My impression is: Enlarged cardiac mediastinal silhouette with mild edema pattern, thoracic nerve stimulator seen  CT:    Reviewed    Assessment/Plan  * Acute right arterial ischemic stroke, MCA (middle cerebral artery) (HCC)  Assessment & Plan  Patient woke with severe left-sided deficits, greater than 4.5 hours from last known normal, not tPA candidate  S/p thrombectomy 7/11 with TICI 2B flow, right carotid artery for access status post catheter removal 7/11 by vascular surgery  Goal blood pressure 120-160  Continue close neuro monitoring  Antiplatelet, statin at 24-hour vadim  PT/OT/rehab eval  Resume Eliquis when cleared by neurology  MRI brain pending  Echo pending    DERREK (obstructive sleep apnea)- (present on admission)  Assessment & Plan  CPAP at night if patient can tolerate    Pulmonary embolus (HCC)- (present on  admission)  Assessment & Plan  History of DVT and PE, has been on Eliquis but missed last 2 doses  Will restart anticoagulation when able per neuro    Atrial fibrillation (HCC)- (present on admission)  Assessment & Plan  Chronic A. fib on Eliquis, digoxin, and diltiazem -currently rate controlled  Optimize electrolytes, continuous telemetry monitoring  Resume digoxin and diltiazem  Hold off on anticoagulation pending MRI brain and clearance by neurology       VTE:  Contraindicated  Ulcer: Not Indicated  Lines: None    I have performed a physical exam and reviewed and updated ROS and Plan today (7/12/2022). In review of yesterday's note (7/11/2022), there are no changes except as documented above.     Discussed patient condition and risk of morbidity and/or mortality with Hospitalist, Family, RN, RT, Pharmacy, Charge nurse / hot rounds, Patient and neurology.  [facility] will sign off.  Please call with questions.

## 2022-07-12 NOTE — THERAPY
Physical Therapy   Initial Evaluation     Patient Name: Alfred Diaz  Age:  82 y.o., Sex:  male  Medical Record #: [id number]  Today's Date: 7/12/2022     Precautions  Precautions: Fall Risk    Assessment  Patient is 82 y.o. male who presented acutely with L facial droop, slurred speech, and L sided weakness.  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  PMH includes A fib, DVT, PE, DERREK, eczema, and HTN.  Today patient lethargic but motivated to participate in therapy.  He presented with impaired strength, balance, coordination, activity tolerance, and sensation.  Patient mobilized as detailed below.  He was able to maintain seated balance for one bout of ~30 sec with initial assistance for positioning.  LE strength testing limited due to lethargy and fatigue.  Patient would benefit from continued acute PT and post acute placement to address impairments and maximize safety & independence with functional mobility.    Plan    Recommend Physical Therapy 4 times per week until therapy goals are met for the following treatments:  Bed Mobility, Equipment, Gait Training, Neuro Re-Education / Balance, Self Care/Home Evaluation, Therapeutic Activities, and Therapeutic Exercises    DC Equipment Recommendations: Unable to determine at this time  Discharge Recommendations: Recommend post-acute placement for additional physical therapy services prior to discharge home     Objective     07/12/22 1011   Precautions   Precautions Fall Risk   Prior Living Situation   Prior Services Home-Independent   Housing / Facility 1 Story House   Steps Into Home (1 small threshold)   Equipment Owned Single Point Cane;Front-Wheel Walker;4-Wheel Walker   Lives with - Patient's Self Care Capacity Spouse   Comments Pt's spouse & dtr present & very supportive.  Wife is retired LTC/rehab RN and dtr lives nearby, both able to assist as needed   Prior Level of Functional Mobility   Bed Mobility Independent   Transfer Status  Independent   Ambulation Independent   Assistive Devices Used Single Point Cane (for community distances)   Comments Pt's wife reported pt uses SPC outside of home, uses motorized scooter in grocery stores   Cognition    Cognition / Consciousness X   Speech/ Communication Hard of Hearing;Delayed Responses (Pt's wife reported pt is [place] but does not like to wear his hearing aids)   Attention Impaired   Sequencing Impaired   Initiation Impaired   Comments Pleasant & cooperative, very lethargic today.   Passive ROM Lower Body   Passive ROM Lower Body X   Comments LLE WFL   Active ROM Lower Body    Active ROM Lower Body  X   Comments RLE appears WFL, testing limited due to lethargy   Strength Lower Body   Lower Body Strength  X   Comments RLE appears at lease 3/5, testing limited due to lethargy & physiatrist just finishing physical exam   Sensation Lower Body   Lower Extremity Sensation   X   Comments Able to feel light touch on LLE, unable to accurately localize   Neurological Concerns   Sitting Posture During ADL's Lateral Lean Left   Comments Able to wt shift to the R with cues   Vision   Vision Comments Pt kept eyes closed throughout most of session due to lethargy   Balance Assessment   Sitting Balance (Static) Poor +   Sitting Balance (Dynamic) Poor   Standing Balance (Static) Dependent   Standing Balance (Dynamic) Dependent   Weight Shift Sitting Poor   Weight Shift Standing Absent   Comments Able to maintain seated balance ~30-35 sec with initial assist for proper positioning.  LOB to L   Gait Analysis   Gait Level Of Assist Unable to Participate   Bed Mobility    Supine to Sit Maximal Assist   Sit to Supine Maximal Assist   Scooting Maximal Assist   Functional Mobility   Sit to Stand Maximal Assist   Bed, Chair, Wheelchair Transfer Unable to Participate   Mobility supine > EOB > STS x 1 > back to bed   Activity Tolerance   Sitting Edge of Bed 10+ min   Standing 1 min   Short Term Goals    Short Term Goal # 1 Pt  will perform supine <> sit with min A within 6 visits in order to progress bed mobility   Short Term Goal # 2 Pt will perform STS with min A within 6 visits in order to progress OOB mobility   Short Term Goal # 3 Pt will perform functional transfer with LRAD and mod A within 6 visits in order to progress OOB mobility   Anticipated Discharge Equipment and Recommendations   DC Equipment Recommendations Unable to determine at this time   Discharge Recommendations Recommend post-acute placement for additional physical therapy services prior to discharge home

## 2022-07-12 NOTE — DISCHARGE PLANNING
Per physiatry patient is a good candidate for IPR. Pt remains NPO, pending SLP eval. TCC will follow.

## 2022-07-12 NOTE — ASSESSMENT & PLAN NOTE
History of, maintained with eliquis, but patient stopped 2 days PTA.   post op vascular retrieval right carotid artery sheath from IR thrombectomy.  Neurology recommending MRI brain prior to restarting AC to ensure no bleed present.  Holding AC for now given hemorrhagic transformation

## 2022-07-12 NOTE — PROGRESS NOTES
Neurology Progress Note  [facility] Acute Neurology    Referring Physician: Jeremy M Gonda, M.D.    CC: Left facial droop, slurred speech, left sided weakness     HPI: Refer to initial documented Neurology H&P, as detailed in the patient's chart.    Interval History 2022: No acute events overnight. Patient is currently laying in bed, awakens to verbal stimuli, following commands, and fully oriented. Left gaze preference with some improvement, but otherwise persistent right MCA syndrome.     Past Medical History:   Past Medical History:   Diagnosis Date   • A-fib ([facility])    • Apnea, sleep    • Arthritis     osteo, bilateral knees, back   • Atrial fibrillation ([facility])    • Back pain    • Back pain    • Chickenpox    • Chronic anticoagulation    • Dental disorder     Full dentures   • DVT (deep venous thrombosis) ([facility])    • Eczema    • Falls    • Frequent urination    • Pashto measles    • Hearing difficulty    • Hypertension    • Mumps    • DERREK (obstructive sleep apnea)    • PAH (pulmonary artery hypertension) ([facility])    • Painful joint     Bilateral knees   • PE (pulmonary thromboembolism) ([facility])    • Pulmonary embolism ([facility])    • Rash    • Shortness of breath    • Sleep apnea    • Snoring    • Swelling of lower extremity    • Venous insufficiency         FHx:  Family History   Problem Relation Age of Onset   • Cancer Mother    • Lung Disease Father         SHx:  Social History     Socioeconomic History   • Marital status:      Spouse name: Not on file   • Number of children: Not on file   • Years of education: Not on file   • Highest education level: Not on file   Occupational History   • Not on file   Tobacco Use   • Smoking status: Former Smoker     Packs/day: 0.00     Years: 3.00     Pack years: 0.00     Types: Cigars     Quit date: 1955     Years since quittin.7   • Smokeless tobacco: Never Used   Vaping Use   • Vaping Use: Never used   Substance and Sexual Activity   • Alcohol use: Not Currently      Alcohol/week: 4.2 oz     Types: 7 Standard drinks or equivalent per week   • Drug use: Never   • Sexual activity: Not on file   Other Topics Concern   • Not on file   Social History Narrative   • Not on file     Social Determinants of Health     Financial Resource Strain: Not on file   Food Insecurity: Not on file   Transportation Needs: Not on file   Physical Activity: Not on file   Stress: Not on file   Social Connections: Not on file   Intimate Partner Violence: Not on file   Housing Stability: Not on file        Medications:    Current Facility-Administered Medications:   •  senna-docusate (PERICOLACE or SENOKOT S) 8.6-50 MG per tablet 2 Tablet, 2 Tablet, Oral, BID **AND** polyethylene glycol/lytes (MIRALAX) PACKET 1 Packet, 1 Packet, Oral, QDAY PRN **AND** magnesium hydroxide (MILK OF MAGNESIA) suspension 30 mL, 30 mL, Oral, QDAY PRN **AND** bisacodyl (DULCOLAX) suppository 10 mg, 10 mg, Rectal, QDAY PRN, Candice Aguila M.D.  •  ipratropium-albuterol (DUONEB) nebulizer solution, 3 mL, Nebulization, Q4H PRN (RT), Candice Aguila M.D.  •  labetalol (NORMODYNE/TRANDATE) injection 10 mg, 10 mg, Intravenous, Q10 MIN PRN, Candice Aguila M.D.  •  hydrALAZINE (APRESOLINE) injection 10 mg, 10 mg, Intravenous, Q2HRS PRN, Candiec Aguila M.D.  •  atorvastatin (LIPITOR) tablet 80 mg, 80 mg, Oral, Q EVENING, Candice Aguila M.D.  •  insulin regular (HumuLIN R,NovoLIN R) injection, 1-6 Units, Subcutaneous, Q6HRS, 1 Units at 07/11/22 4991 **AND** POC blood glucose manual result, , , Q6H **AND** NOTIFY MD and PharmD, , , Once **AND** Administer 20 grams of glucose (approximately 8 ounces of fruit juice) every 15 minutes PRN FSBG less than 70 mg/dL, , , PRN **AND** dextrose 50% (D50W) injection 25 g, 25 g, Intravenous, Q15 MIN PRN, Candice Aguila M.D.  •  Metoprolol Tartrate (LOPRESSOR) injection 5 mg, 5 mg, Intravenous, Q6HRS PRN, Candice Aguila M.D.  •  labetalol (NORMODYNE/TRANDATE) injection 10 mg, 10 mg,  Intravenous, Q10 MIN PRN, José Antonio Madsen M.D.  •  hydrALAZINE (APRESOLINE) injection 10 mg, 10 mg, Intravenous, Q2HRS PRN, José Antonio Madsen M.D.  •  niCARdipine (CARDENE) 25 mg in  mL Infusion, 0-15 mg/hr, Intravenous, Continuous, José Antonio Madsen M.D., Held at 07/11/22 1115    Allergies:  Allergies   Allergen Reactions   • Vicodin [Apap-Fd&C Yellow #10 [place]-Hydrocodone] Rash     Rash        Review of systems: In addition to what is detailed in the interval history above, all other systems reviewed and are negative.      Physical Examination:   Vitals:    07/12/22 0500 07/12/22 0600 07/12/22 0700 07/12/22 0800   BP: 119/70 132/83 132/77 (!) 147/71   Pulse: 84 66 81 73   Resp: (!) 25 (!) 11 12 19   Temp:    36.7 °C (98.1 °F)   TempSrc:    Temporal   SpO2: 96% 96% 95% 96%   Weight:       Height:         General: Patient in no acute distress, pleasant and cooperative.  HEENT: Normocephalic, no signs of acute trauma.   Neck: Supple, no meningeal signs or carotid bruits. There is normal range of motion. No tenderness on exam.   Chest: Regular and unlabored breaths on 2L NC. No cough.   CV: RRR. Peripheral pitting edema to BLE  Skin: No signs of acute rashes or trauma.   Musculoskeletal: Joints exhibit full range of motion, without any pain to palpation. There are no signs of joint or muscle swelling. There is no tenderness to deep palpation of muscles.   Psychiatric: No hallucinatory behavior. Denies symptoms of depression or suicidal ideation. Mood and affect appear normal on exam.     NEUROLOGICAL EXAM:   Mental status, orientation: Drowsy, awakens to verbal stimuli, following commands, and fully oriented.   Speech and language: Speech is moderately dysarthric and fluent. The patient is able to name, repeat, and comprehend.   Memory: There is intact recollection of recent and remote events.   Cranial nerve exam: Pupils are 3-4 mm bilaterally and equally reactive to light and accommodation. Visual  fields with left vision cut/neglect. There is no nystagmus on primary or secondary gaze. Right gaze preference, able to overcome midline but not bury sclera to left. Face appears asymmetric with left lower droop. Sensation in the face is intact to light touch. Uvula is midline. Palate elevates symmetrically. Tongue is midline and without any signs of tongue biting or fasciculations.    Motor exam: Strength is antigravity to RUE and RLE without drift appreciated albeit some RLE limitation due to lower back pain, LUE flexion with spontaneous finger movements, and withdrawals to noxious stimuli to LLE. Tone is normal. No abnormal movements were seen on exam.   Sensory exam: Decreased sensation to LUE and LLE with noxious stimuli compared to left. Left hemisensory neglect with double stimuli.  Deep tendon reflexes:  Plantar responses are up-going to left. There is no clonus.   Coordination: No ataxia with normal finger-nose-finger to RUE, unable to complete to LUE secondary to hemiplegia.   Gait: Deferred per patient preference.     NIH Stroke Scale  7/12/22    1a. Level of Consciousness (Alert, drowsy, etc): 1= Drowsy    1b. LOC Questions (Month, age): 0= Answers both correctly    1c. LOC Commands (Open/close eyes make fist/let go): 0= Obeys both correctly    2.   Best Gaze (Eyes open - patient follows examiner's finger on face): 1= Partial gaze palsy    3.   Visual Fields (introduce visual stimulus/threat to patient's field quadrants): 2= Complete Hemianopia    4.   Facial Paresis (Show teeth, raise eyebrows and squeeze eyes shut): 2 = Partial     5a. Motor Arm - Left (Elevate arm to 90 degrees if patient is sitting, 45 degrees if  supine): 3= No effort against gravity    5b. Motor Arm - Right (Elevate arm to 90 degrees if patient is sitting, 45 degrees if supine): 0= No drift    6a. Motor Leg - Left (Elevate leg 30 degrees with patient supine): 3= No effort against gravity    6b. Motor Leg - Right  (Elevate leg 30  degrees with patient supine): 0= No drift    7.   Limb Ataxia (Finger-nose, heel down shin): 0= No ataxia    8.   Sensory (Pin prick to face, arm, trunk and leg - compare side to side): 1= Partial loss    9.  Best Language (Name item, describe a picture and read sentences): 0= No aphasia    10. Dysarthria (Evaluate speech clarity by patient repeating listed words): 1= Mild to moderate slurring    11. Extinction and Inattention (Use information from prior testing to identify neglect or  double simultaneous stimuli testing): 1= Partial neglect    Total NIH Score: 15      Ancillary Data Reviewed:    Labs:  Lab Results   Component Value Date/Time    PROTHROMBTM 13.4 07/11/2022 07:36 AM    INR 1.03 07/11/2022 07:36 AM      Lab Results   Component Value Date/Time    WBC 11.9 (H) 07/12/2022 02:38 AM    RBC 5.31 07/12/2022 02:38 AM    HEMOGLOBIN 16.9 07/12/2022 02:38 AM    HEMATOCRIT 49.8 07/12/2022 02:38 AM    MCV 93.8 07/12/2022 02:38 AM    MCH 31.8 07/12/2022 02:38 AM    MCHC 33.9 07/12/2022 02:38 AM    MPV 10.9 07/12/2022 02:38 AM    NEUTSPOLYS 75.20 (H) 07/11/2022 07:36 AM    LYMPHOCYTES 16.30 (L) 07/11/2022 07:36 AM    MONOCYTES 7.00 07/11/2022 07:36 AM    EOSINOPHILS 0.70 07/11/2022 07:36 AM    BASOPHILS 0.50 07/11/2022 07:36 AM      Lab Results   Component Value Date/Time    SODIUM 142 07/12/2022 02:38 AM    POTASSIUM 4.2 07/12/2022 02:38 AM    CHLORIDE 106 07/12/2022 02:38 AM    CO2 22 07/12/2022 02:38 AM    GLUCOSE 154 (H) 07/12/2022 02:38 AM    BUN 18 07/12/2022 02:38 AM    CREATININE 0.77 07/12/2022 02:38 AM      Lab Results   Component Value Date/Time    CHOLSTRLTOT 195 07/12/2022 02:38 AM     (H) 07/12/2022 02:38 AM    HDL 59 07/12/2022 02:38 AM    TRIGLYCERIDE 45 07/12/2022 02:38 AM       Lab Results   Component Value Date/Time    ALKPHOSPHAT 89 07/11/2022 07:36 AM    ASTSGOT 16 07/11/2022 07:36 AM    ALTSGPT 16 07/11/2022 07:36 AM    TBILIRUBIN 0.6 07/11/2022 07:36 AM        Imaging/Testing:    I  interpreted and/or reviewed the patient's neuroimaging    CT-HEAD W/O   Final Result      Subacute right MCA territory, temporal lobe, infarction with some new cortical thickening, worsening sulcal effacement/mass effect. No macro hemorrhagic transformation is identified      DX-CHEST-PORTABLE (1 VIEW)   Final Result         Diffuse interstitial prominence could relate to mild fluid overload.      Elevation of the left hemidiaphragm with left basilar atelectasis.      IR-NEURO INTERVENTIONAL CONSULT-IP   Final Result      1.  Occluded right M1 segment.   2.  Right middle cerebral artery thrombectomy through the right carotid access-TICI 2 b flow.      CT-CTA NECK WITH & W/O-POST PROCESSING   Final Result      CT angiogram of the neck within normal limits.      CT-CEREBRAL PERFUSION ANALYSIS   Final Result      1.Cerebral blood flow less than 30% likely representing completed infarct = 148 mL   2.T Max more than 6 seconds likely representing combination of completed infarct and ischemia = 0 mL   3. Mismatched volume likely representing ischemic brain/penumbra= 148 mL   4.Please note that the cerebral perfusion was performed on the limited brain tissue around the basal ganglia region. Infarct/ischemia outside the CT perfusion sections may not be seen on this study.      CT-CTA HEAD WITH & W/O-POST PROCESS   Final Result      RIGHT M1 occlusion      Findings were communicated with and acknowledged by MICA POON via [employer] on 7/11/2022 8:07 AM.      CT-HEAD W/O   Final Result      1. Hyperdense right MCA is suspicious for right MCA occlusion. Attention on CTA.   2. Slightly decreased gray-white differentiation in the right temporal lobe, which may be due to an acute infarct.   3. Mild global parenchymal atrophy. Chronic small vessel ischemic changes.      MR-BRAIN-W/O    (Results Pending)   MR-BRAIN-W/O    (Results Pending)       Assessment and Plan:    Alfred Diaz is a 82 y.o. male with relevant history of  Afib on Eliquis (last dose approximately 2 days ago after running out of medication), DVT, PE, DERREK, and hypertension presenting to the hospital for left facial droop, slurred speech, and left sided weakness for which neurology was consulted for acute stroke. Neurological exam is significant for a dense right MCA syndrome (NIHSS 20). CT head wo contrast showed no acute hemorrhage with hyperdense right MCA and subtle decreased gray-white matter differentiation. CTA head and neck w/wo contrast showed a large vessel occlusion of the right MCA, M1 segment. CTP showed a favorable perfusion defect of the right MCA territory without a core infarct. Dr. Madsen, Neuro IR, was notified and accepted the patient for an emergent thrombectomy for endovascular clot retrieval. The patient was not a IV thrombolytic candidate as last known well was greater than 4.5 hours with unknown onset time of symptoms with patient awakening with right MCA syndrome. Etiology is most likely cardioembolic given his Afib history off anticoagulation (Eliquis).      Plan:     -q4h and PRN neuro assessment. VS per nursing/unit protocol.   -Maintain systolic -160 given TICI 2b reperfusion. This is in an effort to minimize reperfusion injury and/or hemorrhagic conversion. Antihypertensives per ICU team.   -Obtain MRI Brain wo contrast- patient as spinal cord stimulator, awaiting clearance  -Hold anticoagulation pending MRI brain imaging to evaluate for stroke burden and/or hemorrhagic conversion to guide timing to restart anticoagulation.   -Telemetry; currently Afib. Known history of Afib/arrhythmia.   -Continue Atorvastatin 80 mg PO q HS for LDL goal <70. Note .    -BG management per primary team. BG goal . Note hemoglobin A1c is 5.6, within goal <7%   -PT/OT/SLP eval and treat.   -Will follow up with results of the above and make additional recommendations accordingly.    -DVT PPX: SCDs.     The evaluation of the patient, and  recommended management, was discussed with Dr. Navarro, Dr. Gonda, and bedside RN. I have performed a physical exam and reviewed and updated ROS and Plan today (7/12/2022). In review of yesterday's note (7/11/2022), there are no changes except as documented above.    Tremayne Maguire, MSN [facility]  Nurse Practitioner  [facility]  (t) [phone]

## 2022-07-12 NOTE — CONSULTS
Hospital Medicine Consultation    Date of Service  7/12/2022    Referring Physician  Ching Hudson M.D.    Consulting Physician  Ching Hudson M.D.    Reason for Consultation  RICU transfer to medicine    History of Presenting Illness  82 y.o. male who presented 7/11/2022 with initial left-sided weakness slurred speech.  He has a history of A. fib on Eliquis ran out of medication for 2 days prior to admission, DVT PE obstructive sleep apnea hypertension presented with left sided weakness facial droop slurred speech.  IR had vascular surgery consulted on 711 2 remove the 8 [place] right carotid sheath that was placed for the thrombectomy.  Patient underwent right neck exploration to remove carotid sheath from the artery.  On my exam patient was very sleepy unable to awaken to light touch and exam.  However he did work with physical and Occupational Therapy on 712 at 10:18 AM and was able to maintain seated balance for 30 seconds left extremity strength was limited for PT due to lethargy and fatigue, however residual weakness remains.  DERREK on CPAP qhs may be reason for excessive daytime drowsiness.  Order for CPAP qhs placed.  Unclear if tolerating while in ICU.  MRI brain pending prior to start of AC per neurology recs.    Review of Systems  Review of Systems   Unable to perform ROS: Mental acuity       Past Medical History   has a past medical history of A-fib (HCC), Apnea, sleep, Arthritis, Atrial fibrillation (HCC), Back pain, Back pain, Chickenpox, Chronic anticoagulation, Dental disorder, DVT (deep venous thrombosis) (HCC), Eczema, Falls, Frequent urination, Syriac measles, Hearing difficulty, Hypertension, Mumps, DERREK (obstructive sleep apnea), PAH (pulmonary artery hypertension) (HCC), Painful joint, PE (pulmonary thromboembolism) (HCC), Pulmonary embolism (HCC), Rash, Shortness of breath, Sleep apnea, Snoring, Swelling of lower extremity, and Venous insufficiency.    Surgical History   has a past surgical  history that includes turp-vapor; arthroplasty (Bilateral); tonsillectomy; insertion permanent spinal cord stimulator; laminotomy; urology surgery; arthroscopy, knee; cataract extraction with iol (Bilateral); pr lap,inguinal hernia repr,initial (Right, 3/23/2022); and pr thromboendartectmy neck,neck incis (Right, 7/11/2022).    Family History  family history includes Cancer in his mother; Lung Disease in his father.    Social History   reports that he quit smoking about 66 years ago. His smoking use included cigars. He smoked 0.00 packs per day for 3.00 years. He has never used smokeless tobacco. He reports previous alcohol use of about 4.2 oz of alcohol per week. He reports that he does not use drugs.    Medications  Current Facility-Administered Medications   Medication Dose Route Frequency Provider Last Rate Last Admin   • digoxin (LANOXIN) tablet 250 mcg  250 mcg Oral DAILY Jeremy M Gonda, M.D.       • DILTIAZem (CARDIZEM) tablet 120 mg  120 mg Oral DAILY Jeremy M Gonda, M.D.       • furosemide (LASIX) tablet 40 mg  40 mg Oral DAILY Jeremy M Gonda, M.D.       • potassium chloride ER (KLOR-CON) tablet 20 mEq  20 mEq Oral DAILY Jeremy M Gonda, M.D.       • senna-docusate (PERICOLACE or SENOKOT S) 8.6-50 MG per tablet 2 Tablet  2 Tablet Oral BID Candice Aguila M.D.        And   • polyethylene glycol/lytes (MIRALAX) PACKET 1 Packet  1 Packet Oral QDAY PRN Candice Aguila M.D.        And   • magnesium hydroxide (MILK OF MAGNESIA) suspension 30 mL  30 mL Oral QDAY PRN Candice Aguila M.D.        And   • bisacodyl (DULCOLAX) suppository 10 mg  10 mg Rectal QDAY PRN Candice Aguila M.D.       • ipratropium-albuterol (DUONEB) nebulizer solution  3 mL Nebulization Q4H PRN (RT) Candice Aguila M.D.       • labetalol (NORMODYNE/TRANDATE) injection 10 mg  10 mg Intravenous Q10 MIN PRN Candice Aguila M.D.       • hydrALAZINE (APRESOLINE) injection 10 mg  10 mg Intravenous Q2HRS PRN Candice Aguila M.D.       •  atorvastatin (LIPITOR) tablet 80 mg  80 mg Oral Q EVENING Candice Aguila M.D.       • insulin regular (HumuLIN R,NovoLIN R) injection  1-6 Units Subcutaneous Q6HRS Candice Aguila M.D.   1 Units at 07/11/22 2355    And   • dextrose 50% (D50W) injection 25 g  25 g Intravenous Q15 MIN PRN Candice Aguila M.D.       • Metoprolol Tartrate (LOPRESSOR) injection 5 mg  5 mg Intravenous Q6HRS PRN Candice Aguila M.D.       • labetalol (NORMODYNE/TRANDATE) injection 10 mg  10 mg Intravenous Q10 MIN PRN José Antonio Madsen M.D.       • hydrALAZINE (APRESOLINE) injection 10 mg  10 mg Intravenous Q2HRS PRN José Antonio Madsen M.D.           Allergies  Allergies   Allergen Reactions   • Vicodin [Apap-Fd&C Yellow #10 Al Aragon-Hydrocodone] Rash     Rash       Physical Exam  Temp:  [35.8 °C (96.4 °F)-36.7 °C (98.1 °F)] 36.7 °C (98.1 °F)  Pulse:  [61-90] 72  Resp:  [11-30] 12  BP: (119-161)/(66-93) 139/86  SpO2:  [93 %-97 %] 96 %    Physical Exam  Constitutional:       General: He is not in acute distress.     Appearance: Normal appearance. He is not diaphoretic.   HENT:      Head: Normocephalic and atraumatic.      Mouth/Throat:      Pharynx: No oropharyngeal exudate.   Eyes:      General: No scleral icterus.        Right eye: No discharge.         Left eye: No discharge.      Conjunctiva/sclera: Conjunctivae normal.      Pupils: Pupils are equal, round, and reactive to light.   Neck:      Thyroid: No thyromegaly.      Vascular: No JVD.      Trachea: No tracheal deviation.   Cardiovascular:      Rate and Rhythm: Normal rate and regular rhythm.      Heart sounds: Normal heart sounds. No murmur heard.    No friction rub. No gallop.   Pulmonary:      Effort: Pulmonary effort is normal. No respiratory distress.      Breath sounds: Normal breath sounds. No wheezing or rales.   Chest:      Chest wall: No tenderness.   Abdominal:      General: Bowel sounds are normal. There is no distension.      Palpations: Abdomen is soft. There  is no mass.      Tenderness: There is no abdominal tenderness. There is no guarding or rebound.   Musculoskeletal:         General: Tenderness present. Normal range of motion.      Cervical back: Normal range of motion and neck supple.   Lymphadenopathy:      Cervical: No cervical adenopathy.   Skin:     General: Skin is warm and dry.      Findings: No erythema or rash.   Neurological:      Mental Status: He is alert.      Cranial Nerves: No cranial nerve deficit.      Motor: No abnormal muscle tone.      Comments: Patient sleeping soundly on exam, did not awaken to complete.         Fluids      Laboratory  Recent Labs     07/11/22  0736 07/12/22  0238   WBC 7.3 11.9*   RBC 5.36 5.31   HEMOGLOBIN 16.9 16.9   HEMATOCRIT 50.9 49.8   MCV 95.0 93.8   MCH 31.5 31.8   MCHC 33.2* 33.9   RDW 49.8 49.1   PLATELETCT 166 191   MPV 10.9 10.9     Recent Labs     07/11/22  0736 07/12/22  0238   SODIUM 138 142   POTASSIUM 3.8 4.2   CHLORIDE 104 106   CO2 24 22   GLUCOSE 118* 154*   BUN 21 18   CREATININE 0.94 0.77   CALCIUM 8.6 8.3*     Recent Labs     07/11/22  0736   APTT 28.9   INR 1.03          Recent Labs     07/12/22  0238   TRIGLYCERIDE 45   HDL 59   *        Imaging  CT-HEAD W/O   Final Result      Subacute right MCA territory, temporal lobe, infarction with some new cortical thickening, worsening sulcal effacement/mass effect. No macro hemorrhagic transformation is identified      DX-CHEST-PORTABLE (1 VIEW)   Final Result         Diffuse interstitial prominence could relate to mild fluid overload.      Elevation of the left hemidiaphragm with left basilar atelectasis.      IR-NEURO INTERVENTIONAL CONSULT-IP   Final Result      1.  Occluded right M1 segment.   2.  Right middle cerebral artery thrombectomy through the right carotid access-TICI 2 b flow.      CT-CTA NECK WITH & W/O-POST PROCESSING   Final Result      CT angiogram of the neck within normal limits.      CT-CEREBRAL PERFUSION ANALYSIS   Final Result       1.Cerebral blood flow less than 30% likely representing completed infarct = 148 mL   2.T Max more than 6 seconds likely representing combination of completed infarct and ischemia = 0 mL   3. Mismatched volume likely representing ischemic brain/penumbra= 148 mL   4.Please note that the cerebral perfusion was performed on the limited brain tissue around the basal ganglia region. Infarct/ischemia outside the CT perfusion sections may not be seen on this study.      CT-CTA HEAD WITH & W/O-POST PROCESS   Final Result      RIGHT M1 occlusion      Findings were communicated with and acknowledged by MICA POON via Voalte Me on 7/11/2022 8:07 AM.      CT-HEAD W/O   Final Result      1. Hyperdense right MCA is suspicious for right MCA occlusion. Attention on CTA.   2. Slightly decreased gray-white differentiation in the right temporal lobe, which may be due to an acute infarct.   3. Mild global parenchymal atrophy. Chronic small vessel ischemic changes.      MR-BRAIN-W/O    (Results Pending)   MR-BRAIN-W/O    (Results Pending)       Assessment/Plan  * Acute right arterial ischemic stroke, MCA (middle cerebral artery) (HCC)  Assessment & Plan  S/p IR thrombectomy on 7/11.  Residual left sided weakness remains.  Likely thromboembolic from afib.  Pending MRI brain to ensure no hemorrhagic conversion prior to starting AC.  Maintain -160.  lipitor.    DERREK (obstructive sleep apnea)- (present on admission)  Assessment & Plan  Continue CPAP. Will order for CPAP qhs.  Likely reason patient with hypersomnolence.    Pulmonary embolus (HCC)- (present on admission)  Assessment & Plan  History of, maintained with eliquis, but patient stopped 2 days PTA.   post op vascular retrieval right carotid artery sheath from IR thrombectomy.  Neurology recommending MRI brain prior to restarting AC to ensure no bleed present.    Atrial fibrillation (HCC)- (present on admission)  Assessment & Plan  Maintained on Eliquis PTA.  MRI brain pending  start of AC per neurology.

## 2022-07-12 NOTE — NON-PROVIDER
Case Management Discharge Planning    Admission Date: 7/11/2022  GMLOS: 7.1  ALOS: 1    6-Clicks ADL Score: 11  6-Clicks Mobility Score: 6  PT and/or OT Eval ordered: Yes  Post-acute Referrals Ordered: Yes  Post-acute Choice Obtained: No  Has referral(s) been sent to post-acute provider:  No      Anticipated Discharge Dispo: Discharge Disposition: D/T to SNF with Medicare cert in anticipation of skilled care (03)    DME Needed: No    Action(s) Taken: DC Assessment Complete (See below)    Escalations Completed: None    Medically Clear: No    Next Steps: Assessment completed by this MSN student through chart review.  Patient lives at home with spouse in a single story home in [place].  Daughter is also supportive and lives nearby.  Patient owns a single point cane that he uses for community distances, FWW, 4WW, and wife reports to therapies that patient uses motorized scooter in the grocery store.  Otherwise, patient was independent with ADL's/IADL's prior to admission.  Patient's primary care provider is Hallie Siddiqi MD.  He uses the [facility] on [facility].    PT/OT saw patient and recommend post-acute placement.  PMR consulted and notes show patient would be good candidate for IPR.  [facility] to obtain choice and discuss with family            Barriers to Discharge: Medical clearance and Pending Placement    Is the patient up for discharge tomorrow: No           Care Transition Team Assessment         Emergency Contacts    Nona Diaz (spouse) [phone]      Magdy Joe (daughter) [phone]     Information Source: chart review   Orientation Level: Oriented to place, Oriented to person  Information Given By: Other (Comments)  Informant's Name: EHR/chart review  Who is responsible for making decisions for patient? : Spouse  Name(s) of Primary Decision Maker: Nona iDaz    Readmission Evaluation  Is this a readmission?: No    Elopement Risk  Legal Hold: No  Ambulatory or Self Mobile in Wheelchair:  No-Not an Elopement Risk  Elopement Risk: Not at Risk for Elopement    Interdisciplinary Discharge Planning  Lives with - Patient's Self Care Capacity: Spouse  Patient or legal guardian wants to designate a caregiver: No  Housing / Facility: [address]  Prior Services: Home-Independent    Discharge Preparedness  What is your plan after discharge?: Uncertain - pending medical team collaboration  What are your discharge supports?: Spouse, Child  Prior Functional Level: Ambulatory, Independent with Activities of Daily Living, Uses Cane, Uses Walker  Difficulity with ADLs: None  Difficulity with IADLs: None    Functional Assesment  Prior Functional Level: Ambulatory, Independent with Activities of Daily Living, Uses Cane, Uses Walker    Finances  Financial Barriers to Discharge: No  Prescription Coverage: Yes    Vision / Hearing Impairment  Vision Impairment : Yes  Hearing Impairment : Yes  Hearing Impairment: Both Ears    Advance Directive  Advance Directive?: Clinically incapacitated / Inappropriate    Psychological Assessment  History of Substance Abuse: None  History of Psychiatric Problems: No  Non-compliant with Treatment: No  Newly Diagnosed Illness: Yes    Discharge Risks or Barriers  Discharge risks or barriers?: No  Patient risk factors: Vulnerable adult    Anticipated Discharge Information  Discharge Disposition: D/T to SNF with Medicare cert in anticipation of skilled care (03)    Authored by:  Annabella Minaya, RN, MSN Care Coordination Student  Reviewed by:  FILIPPO Delgado RN

## 2022-07-12 NOTE — PROGRESS NOTES
"Radiology Progress Note   Author: LOLI Ingram Date & Time created: 7/12/2022  2:10 PM   Date of admission  7/11/2022  Note to reader: this note follows the APSO format rather than the historical SOAP format. Assessment and plan located at the top of the note for ease of use.    Chief Complaint  82 y.o. male admitted 7/11/2022 with stroke    HPI  \" 82 y.o. male with relevant history of Afib on Eliquis (last dose approximately 2 days ago after running out of medication), DVT, PE, DERREK, and hypertension presenting to the hospital for left facial droop, slurred speech, and left sided weakness for which neurology was consulted for acute stroke. Neurological exam is significant for a dense right MCA syndrome (NIHSS 20). CT head wo contrast showed no acute hemorrhage with hyperdense right MCA and subtle decreased gray-white matter differentiation. CTA head and neck w/wo contrast showed a large vessel occlusion of the right MCA, M1 segment. CTP showed a favorable perfusion defect of the right MCA territory without a core infarct. Dr. Madsen, Neuro IR, was notified and accepted the patient for an emergent thrombectomy for endovascular clot retrieval. The patient was not a IV thrombolytic candidate as last known well was greater than 4.5 hours with unknown onset time of symptoms with patient awakening with right MCA syndrome. Etiology is most likely cardioembolic given his Afib history off anticoagulation (Eliquis)\"        Assessment/Plan  Interval History   Principal Problem:    Acute right arterial ischemic stroke, MCA (middle cerebral artery) (HCC)  Active Problems:    Atrial fibrillation (HCC)    Pulmonary embolus (HCC)    DERREK (obstructive sleep apnea)      Plan IR  - Neuro checks per ICU protocol  - -110   - Secondary stroke prevention per Neurology recommendations  - Pending MRI   - Pending PT/OT/ Swallow eval  - CT No macro hemorrhagic transformation is identified    -Thank you for allowing " Neuro Interventional Radiology team to participate in the patients care, if any additional care or requests are needed in the future please do not hesitate call or place IR order   [phone]      IR:   7/11- Right M1 Mechanical thrombectomy,TICI 2b. Right carotid artery and right groin access site. Patient was taken to OR with MD Robertson post procedure for right carotid sheath removal.     7/12-  Right groin site CDI, no redness or swelling, Right neck site with surigal wound, site CDI, slight inflammation. Left facial droop, left arm weakness 1/5, lear leg weakness 2/5, Right upper 5/5, right lower 2/5. Decreased sensation in LLE. Left gaze preference             Review of Systems  Physical Exam   Review of Systems   Constitutional: Positive for malaise/fatigue. Negative for chills and fever.   HENT: Negative for hearing loss.    Eyes: Negative for blurred vision.   Respiratory: Negative for cough and shortness of breath.    Cardiovascular: Positive for leg swelling. Negative for chest pain and palpitations.   Gastrointestinal: Negative for abdominal pain and vomiting.   Genitourinary: Negative for dysuria.   Musculoskeletal: Negative for myalgias.   Skin: Negative for rash.   Neurological: Positive for sensory change, speech change, focal weakness and weakness. Negative for dizziness and headaches.   Endo/Heme/Allergies: Does not bruise/bleed easily.      Vitals:    07/12/22 1100   BP: 139/86   Pulse: 72   Resp: 12   Temp:    SpO2: 96%        Physical Exam  Constitutional:       Appearance: He is overweight.   HENT:      Head: Normocephalic.      Nose: Nose normal.      Mouth/Throat:      Mouth: Mucous membranes are dry.   Eyes:      Pupils: Pupils are equal, round, and reactive to light.   Cardiovascular:      Rate and Rhythm: Normal rate.   Pulmonary:      Effort: Pulmonary effort is normal. No respiratory distress.   Abdominal:      General: Abdomen is flat.      Tenderness: There is no abdominal tenderness.    Musculoskeletal:         General: No tenderness or deformity.      Cervical back: Neck supple.      Right lower leg: Edema present.      Left lower leg: Edema present.   Skin:     General: Skin is warm and dry.      Capillary Refill: Capillary refill takes less than 2 seconds.      Coloration: Skin is not jaundiced or pale.   Neurological:      Mental Status: He is alert.      Cranial Nerves: Facial asymmetry present.      Sensory: Sensory deficit present.      Motor: Weakness present.      Coordination: Coordination abnormal.      Comments:  Left facial droop, left arm weakness 1/5, lear leg weakness 2/5, Right upper 5/5, right lower 2/5. Decreased sensation in LLE. Left gaze preference       Psychiatric:         Mood and Affect: Mood normal.         Behavior: Behavior normal.             Labs    Recent Labs     07/11/22 0736 07/12/22  0238   WBC 7.3 11.9*   RBC 5.36 5.31   HEMOGLOBIN 16.9 16.9   HEMATOCRIT 50.9 49.8   MCV 95.0 93.8   MCH 31.5 31.8   MCHC 33.2* 33.9   RDW 49.8 49.1   PLATELETCT 166 191   MPV 10.9 10.9     Recent Labs     07/11/22  0736 07/12/22  0238   SODIUM 138 142   POTASSIUM 3.8 4.2   CHLORIDE 104 106   CO2 24 22   GLUCOSE 118* 154*   BUN 21 18   CREATININE 0.94 0.77   CALCIUM 8.6 8.3*     Recent Labs     07/11/22 0736 07/12/22  0238   ALBUMIN 3.8  --    TBILIRUBIN 0.6  --    ALKPHOSPHAT 89  --    TOTPROTEIN 6.4  --    ALTSGPT 16  --    ASTSGOT 16  --    CREATININE 0.94 0.77     CT-HEAD W/O   Final Result      Subacute right MCA territory, temporal lobe, infarction with some new cortical thickening, worsening sulcal effacement/mass effect. No macro hemorrhagic transformation is identified      DX-CHEST-PORTABLE (1 VIEW)   Final Result         Diffuse interstitial prominence could relate to mild fluid overload.      Elevation of the left hemidiaphragm with left basilar atelectasis.      IR-NEURO INTERVENTIONAL CONSULT-IP   Final Result      1.  Occluded right M1 segment.   2.  Right middle  cerebral artery thrombectomy through the right carotid access-TICI 2 b flow.      CT-CTA NECK WITH & W/O-POST PROCESSING   Final Result      CT angiogram of the neck within normal limits.      CT-CEREBRAL PERFUSION ANALYSIS   Final Result      1.Cerebral blood flow less than 30% likely representing completed infarct = 148 mL   2.T Max more than 6 seconds likely representing combination of completed infarct and ischemia = 0 mL   3. Mismatched volume likely representing ischemic brain/penumbra= 148 mL   4.Please note that the cerebral perfusion was performed on the limited brain tissue around the basal ganglia region. Infarct/ischemia outside the CT perfusion sections may not be seen on this study.      CT-CTA HEAD WITH & W/O-POST PROCESS   Final Result      RIGHT M1 occlusion      Findings were communicated with and acknowledged by MICA POON via Voalte Me on 7/11/2022 8:07 AM.      CT-HEAD W/O   Final Result      1. Hyperdense right MCA is suspicious for right MCA occlusion. Attention on CTA.   2. Slightly decreased gray-white differentiation in the right temporal lobe, which may be due to an acute infarct.   3. Mild global parenchymal atrophy. Chronic small vessel ischemic changes.      MR-BRAIN-W/O    (Results Pending)   MR-BRAIN-W/O    (Results Pending)       INR   Date Value Ref Range Status   07/11/2022 1.03 0.87 - 1.13 Final     Comment:     INR - Non-therapeutic Reference Range: 0.87-1.13  INR - Therapeutic Reference Range: 2.0-4.0       No results found for: POCINR     Intake/Output Summary (Last 24 hours) at 7/12/2022 1410  Last data filed at 7/11/2022 1800  Gross per 24 hour   Intake --   Output 700 ml   Net -700 ml      Labs not explicitly included in this progress note were reviewed by the author. Radiology/imaging not explicitly included in this progress note was reviewed by the author.     I have performed a physical exam and reviewed and updated ROS and Plan today (7/12/2022).     20 critical care  minutes in directly providing and coordinating care and extensive data review.  No time overlap and excludes procedures.

## 2022-07-12 NOTE — CONSULTS
Physical Medicine and Rehabilitation Consultation              Date of initial consultation: 2022  Requesting provider: Candice Aguila MD   Consulting provider: Nancy Alicea D.O.  Reason for consultation: assess for acute inpatient rehab appropriateness  LOS: 1 Day(s)    Chief complaint: Left sided weakness, slurred speech     HPI: The patient is a 82 y.o.  male with a past medical history of afib on eliquis (ran out of medication 2 days prior to admission), DVT, PE, DERREK, and HTN ;  who presented on 2022  7:39 AM with new onset left sided weakness, facial droop, and slurred speech. At time of evaluation in the ED BP was 180/100, and NIHSS 20. CT head obtained showed no acute intracranial abnormalities. CTA head and neck showed a large vessel occlusion of the R MCA, M1 segment. CT perfusion scan showed defect of the R MCA territory. Neurology was consulted and neuro IR consulted, patient was deemed appropriate for an emergent thrombectomy. Patient was taken to IR on  for mechanical thrombectomy of the R M1 occlusion, TICI 2b. Patient was admitted to the ICU for further monitoring.  S/p  Mechanical thrombectomy, patient has been very sleepy, but able to interact with therapy and nursing.     Patient seen and examined at bedside with wife, daughter, PT and OT at bedside. Patient continues to have left sided weakness and slurred speech. Denies HA, lightheadedness, or dizziness. Is very tired, closes eyes during visit, however he does make jokes.     Social Hx:  Patient lives with wife in a 1 story house with one small step to enter.   1 ROSARIO  At prior level of function patient was independent with mobility and ADLs, used a SPC in the community.       Tobacco: Denies  Alcohol: Denies  Drugs: Denies     THERAPY:  Restrictions: Fall risk, NPO   PT: Functional mobility   Pending PT note    OT: ADLs  Pending OT Note    SLP:   Currently NPO, pending SLP note     IMAGIN/12 CT  HEAD  IMPRESSION:     Subacute right MCA territory, temporal lobe, infarction with some new cortical thickening, worsening sulcal effacement/mass effect. No macro hemorrhagic transformation is identified    PROCEDURES:  7/11 Mechanical thrombectomy for R M1 occlusion, TICI 2b performed by Dr. Madsen     PMH:  Past Medical History:   Diagnosis Date   • A-fib (HCC)    • Apnea, sleep    • Arthritis     osteo, bilateral knees, back   • Atrial fibrillation (HCC)    • Back pain    • Back pain    • Chickenpox    • Chronic anticoagulation    • Dental disorder     Full dentures   • DVT (deep venous thrombosis) ([facility])    • Eczema    • Falls    • Frequent urination    • Polish measles    • Hearing difficulty    • Hypertension    • Mumps    • DERREK (obstructive sleep apnea)    • PAH (pulmonary artery hypertension) ([facility])    • Painful joint     Bilateral knees   • PE (pulmonary thromboembolism) ([facility])    • Pulmonary embolism (HCC)    • Rash    • Shortness of breath    • Sleep apnea    • Snoring    • Swelling of lower extremity    • Venous insufficiency        PSH:  Past Surgical History:   Procedure Laterality Date   • NC THROMBOENDARTECTMY NECK,NECK INCIS Right 7/11/2022    Procedure: RIGHT NECK EXPLORATION; REMOVAL OF RIGHT CAROTID SHEATH;  Surgeon: Sebastian Robertson M.D.;  Location: SURGERY [facility];  Service: Vascular   • NC LAP,INGUINAL HERNIA REPR,INITIAL Right 3/23/2022    Procedure: REPAIR, HERNIA, INGUINAL, ROBOT-ASSISTED, USING DA ANDRA XI - LARGE;  Surgeon: Itzel Bennett M.D.;  Location: SURGERY [facility];  Service: Gen Robotic   • ARTHROPLASTY Bilateral     knees   • ARTHROSCOPY, KNEE     • CATARACT EXTRACTION WITH IOL Bilateral    • INSERTION PERMANENT SPINAL CORD STIMULATOR     • LAMINOTOMY      lumbar   • TONSILLECTOMY     • TURP-VAPOR     • UROLOGY SURGERY         FHX:  Family History   Problem Relation Age of Onset   • Cancer Mother    • Lung Disease Father        Medications:  Current  "Facility-Administered Medications   Medication Dose   • senna-docusate (PERICOLACE or SENOKOT S) 8.6-50 MG per tablet 2 Tablet  2 Tablet    And   • polyethylene glycol/lytes (MIRALAX) PACKET 1 Packet  1 Packet    And   • magnesium hydroxide (MILK OF MAGNESIA) suspension 30 mL  30 mL    And   • bisacodyl (DULCOLAX) suppository 10 mg  10 mg   • ipratropium-albuterol (DUONEB) nebulizer solution  3 mL   • labetalol (NORMODYNE/TRANDATE) injection 10 mg  10 mg   • hydrALAZINE (APRESOLINE) injection 10 mg  10 mg   • atorvastatin (LIPITOR) tablet 80 mg  80 mg   • insulin regular (HumuLIN R,NovoLIN R) injection  1-6 Units    And   • dextrose 50% (D50W) injection 25 g  25 g   • Metoprolol Tartrate (LOPRESSOR) injection 5 mg  5 mg   • labetalol (NORMODYNE/TRANDATE) injection 10 mg  10 mg   • hydrALAZINE (APRESOLINE) injection 10 mg  10 mg   • niCARdipine (CARDENE) 25 mg in  mL Infusion  0-15 mg/hr       Allergies:  Allergies   Allergen Reactions   • Vicodin [Apap-Fd&C Yellow #10 Al Aragon-Hydrocodone] Rash     Rash       Physical Exam:  Vitals: /77   Pulse 81   Temp 36 °C (96.8 °F) (Temporal)   Resp 12   Ht 1.93 m (6' 4\")   Wt 105 kg (231 lb 7.7 oz)   SpO2 95%   Gen: NAD, laying comfortably in bed, wife and daughter at bedside   Head: NC/AT   Eyes/ Nose/ Mouth: PERRLA, moist mucous membranes, keeps eyes closed, but opens them when prompted   Cardio: RRR, good distal perfusion, warm extremities  Pulm: normal respiratory effort, no cyanosis   Abd: Soft NTND, negative borborygmi   Ext: No peripheral edema. No calf tenderness. No clubbing.    Mental status:  A&Ox4 (person, place, date, situation) answers questions appropriately follows commands,makes jokes during visit   Speech: fluent, no aphasia or + dysarthria     CRANIAL NERVES:  2,3: visual acuity grossly intact, PERRL  3,4,6: EOMI bilaterally, no nystagmus or diplopia  5: sensation intact to light touch bilaterally and symmetric  7: + left sided facial droop "   8: hearing grossly intact  9,10: symmetric palate elevation      Motor:      Upper Extremity  Myotome R L   Shoulder flexion C5 5/5 0/5   Elbow flexion C5 5/5 1/5   Wrist extension C6 5/5 1/5   Elbow extension C7 5/5 2/5   Finger flexion C8 5/5 3/5   Finger abduction T1 5/5 3/5     Lower Extremity Myotome R L   Hip flexion L2 5/5 0/5   Knee extension L3 5/5 1/5   Ankle dorsiflexion L4 5/5 0/5   Toe extension L5 5/5 0/5   Ankle plantarflexion S1 5/5 1/5       Sensory:   intact to light touch through out RUE, decreased sensation to LLE     DTRs: 2+ in RUE bicep, 1+ LUE bicep   No clonus at bilateral ankles  Negative Shaikh b/l     Tone: no spasticity noted, no cogwheeling noted    Coordination:   intact finger to nose RUE   intact fine motor with fingers RUE       Labs: Reviewed and significant for   Recent Labs     22  0722  0238   RBC 5.36 5.31   HEMOGLOBIN 16.9 16.9   HEMATOCRIT 50.9 49.8   PLATELETCT 166 191   PROTHROMBTM 13.4  --    APTT 28.9  --    INR 1.03  --      Recent Labs     22  0736 22  0238   SODIUM 138 142   POTASSIUM 3.8 4.2   CHLORIDE 104 106   CO2 24 22   GLUCOSE 118* 154*   BUN 21 18   CREATININE 0.94 0.77   CALCIUM 8.6 8.3*     Recent Results (from the past 24 hour(s))   EKG (NOW)    Collection Time: 22 11:32 AM   Result Value Ref Range    Report       [facility]    Test Date:  2022  Pt Name:    SARAHI MEDINA               Department: ER  MRN:        [id number]                      Room:       [facility]  Gender:     Male                         Technician: hospitals  :        [id number]                   Requested By:MICA POON  Order #:    [id number]                    Reading MD: Tommy Patel MD    Measurements  Intervals                                Axis  Rate:       85                           P:  CT:                                      QRS:        41  QRSD:       104                          T:          225  QT:         368  QTc:         438    Interpretive Statements  ATRIAL FIBRILLATION, V-RATE    NONSPECIFIC ST-T WAVE ABNORMALITIES, INFEROLATERAL LEADS  Compared to ECG 03/09/2022 10:33:42  Ventricular premature complex(es) no longer present  Possible ischemia no longer present  Electronically Signed On 7- 14:06:08 PDT by Tommy Patel MD     POCT glucose device results    Collection Time: 07/11/22 11:37 AM   Result Value Ref Range    POC Glucose, Blood 107 (H) 65 - 99 mg/dL   ABO Rh Confirm    Collection Time: 07/11/22 11:38 AM   Result Value Ref Range    ABO Rh Confirm A POS    POCT activated clotting time device results    Collection Time: 07/11/22  1:55 PM   Result Value Ref Range    Istat Activated Clotting Time 196 (H) 74 - 137 sec   POCT glucose device results    Collection Time: 07/11/22  6:15 PM   Result Value Ref Range    POC Glucose, Blood 131 (H) 65 - 99 mg/dL   POCT glucose device results    Collection Time: 07/11/22 11:21 PM   Result Value Ref Range    POC Glucose, Blood 153 (H) 65 - 99 mg/dL   CBC without Differential    Collection Time: 07/12/22  2:38 AM   Result Value Ref Range    WBC 11.9 (H) 4.8 - 10.8 K/uL    RBC 5.31 4.70 - 6.10 M/uL    Hemoglobin 16.9 14.0 - 18.0 g/dL    Hematocrit 49.8 42.0 - 52.0 %    MCV 93.8 81.4 - 97.8 fL    MCH 31.8 27.0 - 33.0 pg    MCHC 33.9 33.7 - 35.3 g/dL    RDW 49.1 35.9 - 50.0 fL    Platelet Count 191 164 - 446 K/uL    MPV 10.9 9.0 - 12.9 fL   Basic Metabolic Panel (BMP)    Collection Time: 07/12/22  2:38 AM   Result Value Ref Range    Sodium 142 135 - 145 mmol/L    Potassium 4.2 3.6 - 5.5 mmol/L    Chloride 106 96 - 112 mmol/L    Co2 22 20 - 33 mmol/L    Glucose 154 (H) 65 - 99 mg/dL    Bun 18 8 - 22 mg/dL    Creatinine 0.77 0.50 - 1.40 mg/dL    Calcium 8.3 (L) 8.5 - 10.5 mg/dL    Anion Gap 14.0 7.0 - 16.0   Magnesium    Collection Time: 07/12/22  2:38 AM   Result Value Ref Range    Magnesium 1.8 1.5 - 2.5 mg/dL   PHOSPHORUS    Collection Time: 07/12/22  2:38 AM   Result Value  Ref Range    Phosphorus 3.8 2.5 - 4.5 mg/dL   HEMOGLOBIN A1C    Collection Time: 07/12/22  2:38 AM   Result Value Ref Range    Glycohemoglobin 5.6 4.0 - 5.6 %    Est Avg Glucose 114 mg/dL   Lipid Profile    Collection Time: 07/12/22  2:38 AM   Result Value Ref Range    Cholesterol,Tot 195 100 - 199 mg/dL    Triglycerides 45 0 - 149 mg/dL    HDL 59 >=40 mg/dL     (H) <100 mg/dL   ESTIMATED GFR    Collection Time: 07/12/22  2:38 AM   Result Value Ref Range    GFR (CKD-EPI) 89 >60 mL/min/1.73 m 2   POCT glucose device results    Collection Time: 07/12/22  5:59 AM   Result Value Ref Range    POC Glucose, Blood 149 (H) 65 - 99 mg/dL         ASSESSMENT:  Patient is a 82 y.o. male admitted with left sided weakness due to R M1 occlusion     [facility] Code / Diagnosis to Support: 0001.1 - Stroke: Left Body Involvement (Right Brain)    Rehabilitation: Impaired ADLs and mobility  Patient is a good candidate for inpatient rehab based on needs for PT, OT, and speech therapy, see dispo details below.     Barriers to transfer include: Insurance authorization, TCCs to verify disposition, medical clearance and bed availability     Additional Recommendations:   R MCA CVA  - greatest deficits are L sided weakness, dysarthria and dysphagia  - s/p mechanical thrombectomy on 7/11 with TICI 2b   - neuro consulted, repeat CT head 7/12  with evidence of R MCA infarct, no evidence of hemorrhage   - etiology of CVA likely cardiemoblic, has missed 2 days of eliquis   - currently on statin, will need confirmation of start date for anticoag after mechanical thrombectomy   - continue with PT/OT/SLP    Dysphagia  - patient currently NPO, SLP eval pending yesterday due to fatigue  - anticipate SLP eval today 7/12     Afib  - on eliquis at home, currently held     HTN  - currently in IV antihypertensive, likely due to NPO status  - will need BP well controlled on oral meds prior to acceptance to IRF     Dispo  -patient is currently functioning  below his level of baseline, will need post acute rehab  - recommend IRF level therapy with 3hrs of therapy 5 days per week   - prior to acceptance to IRF will need SLP eval completed, transition to oral meds, and clarification of anticoag   - TCC to confirm family support and DC plan   - PM&R to follow peripherally       Medical Complexity:  R MCA CVA  afib   HTN   Dysphagia   Impaired mobility and ADLs       DVT PPX: SCDs       Thank you for allowing us to participate in the care of this patient.     Patient was seen for 89 minutes on unit/floor of which > 50% of time was spent on counseling and coordination of care regarding the above, including prognosis, risk reduction, benefits of treatment, and options for next stage of care.    Nancy Alicea D.O.   Physical Medicine and Rehabilitation     Please note that this dictation was created using voice recognition software. I have made every reasonable attempt to correct obvious errors, but there may be errors of grammar and possibly content that I did not discover before finalizing the note.

## 2022-07-12 NOTE — THERAPY
Occupational Therapy   Initial Evaluation     Patient Name: Alfred Diaz  Age:  82 y.o., Sex:  male  Medical Record #: [id number]  Today's Date: 7/12/2022     Precautions  Precautions: (P) Fall Risk    Assessment    Patient is 82 y.o. male admitted for L facial droop, slurred speech, and L sided weakness.  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  PMH includes A fib, DVT, PE, DERREK, eczema, and HTN. Pt normally independent with all functional mobilty and ADLs at baseline living in a SLH with spouse and using a SPC in the community. Pt lethargic during session, but motivated to participate, pt required maxA for functional mobility and ADLs likely due to lethargy. Will continue to see for skilled therapy while admitted as well as recommend post-acute placement.       Plan    Recommend Occupational Therapy 4 times per week until therapy goals are met for the following treatments:  Adaptive Equipment, Neuro Re-Education / Balance, Self Care/Activities of Daily Living, Therapeutic Activities, and Therapeutic Exercises.    DC Equipment Recommendations: (P) Unable to determine at this time  Discharge Recommendations: (P) Recommend post-acute placement for additional occupational therapy services prior to discharge home     Objective       07/12/22 1018   Prior Living Situation   Prior Services Home-Independent   Housing / Facility 1 Story House   Steps Into Home 1   Bathroom Set up Walk In Shower   Equipment Owned Single Point Cane;4-Wheel Walker;Front-Wheel Walker   Lives with - Patient's Self Care Capacity Spouse   Comments Spouse and dtr present and very supportive of patient   Prior Level of ADL Function   Self Feeding Independent   Grooming / Hygiene Independent   Bathing Independent   Dressing Independent   Toileting Independent   Prior Level of IADL Function   Medication Management Independent   Laundry Independent   Kitchen Mobility Independent   Finances Independent   Home Management  Independent   Shopping Independent   Prior Level Of Mobility Independent With Device in Community   Driving / Transportation Driving Independent   Occupation (Pre-Hospital Vocational) Retired Due To Age   Precautions   Precautions Fall Risk   Pain 0 - 10 Group   Therapist Pain Assessment Post Activity Pain Same as Prior to Activity;Nurse Notified   Non Verbal Descriptors   Non Verbal Scale  Calm   Cognition    Cognition / Consciousness X   Speech/ Communication Hard of Hearing;Delayed Responses;Dysarthric;Slurred   Level of Consciousness Responds to voice   Attention Impaired   Sequencing Impaired   Initiation Impaired   Comments Pleasant, cooperative, very lethargic but participatory   Active ROM Upper Body   Active ROM Upper Body  X   Dominant Hand Right   Comments LUE limited by weakness   Strength Upper Body   Upper Body Strength  X   Comments RUE WFL, L shoulder limited by weakness, elbow/wrist/fingers 3/5   Sensation Upper Body   Upper Extremity Sensation  X   Lt Upper Extremity Light Touch Impaired   Lt Upper Extremity Proprioception Impaired   Upper Body Muscle Tone   Upper Body Muscle Tone  X   Lt Upper Extremity Muscle Tone Hypotonic;Gross Assist   Neurological Concerns   Neurological Concerns Yes   Sitting Posture During ADL's Lateral Lean Left   Lt Upper Extremity Gross Motor Control Ataxic;Impaired   Lt Upper Extremity Functional Use Impaired   Balance Assessment   Sitting Balance (Static) Poor +   Sitting Balance (Dynamic) Poor   Standing Balance (Static) Dependent   Standing Balance (Dynamic) Dependent   Weight Shift Sitting Poor   Weight Shift Standing Absent   Bed Mobility    Supine to Sit Maximal Assist   Sit to Supine Maximal Assist   Scooting Maximal Assist   ADL Assessment   Grooming Maximal Assist;Seated   Upper Body Dressing Maximal Assist   Lower Body Dressing Maximal Assist   How much help from another person does the patient currently need...   Putting on and taking off regular lower body  clothing? 2   Bathing (including washing, rinsing, and drying)? 2   Toileting, which includes using a toilet, bedpan, or urinal? 2   Putting on and taking off regular upper body clothing? 2   Taking care of personal grooming such as brushing teeth? 2   Eating meals? 1   6 Clicks Daily Activity Score 11   mRS Prior to admission   Prior to admission mRS 0   Modified [place] (mRS)   Modified [place] Score 4   Functional Mobility   Sit to Stand Maximal Assist   Bed, Chair, Wheelchair Transfer Unable to Participate   Toilet Transfers Unable to Participate   Mobility bed mobility, STS at EOB, back to bed   ICU Target Mobility Level   ICU Mobility - Targeted Level Level 2   Visual Perception   Visual Perception  Not Tested   Comments due to lethargy, pt had trouble being formally tested   Activity Tolerance   Sitting Edge of Bed 10 min   Standing 1 min   Short Term Goals   Short Term Goal # 1 Pt will complete ADL transfers with supervision   Short Term Goal # 2 Pt will complete full body dressing with supervision   Short Term Goal # 3 Pt will complete toileting with supervision   Education Group   Education Provided Role of Occupational Therapist;Stroke   Role of Occupational Therapist Patient Response Patient;Family;Acceptance;Explanation   Stroke Patient Response Patient;Family;Acceptance;Explanation   Problem List   Problem List Decreased Active Daily Living Skills;Decreased Homemaking Skills;Decreased Upper Extremity Strength Left;Decreased Upper Extremity AROM Left;Decreased Functional Mobility;Decreased Activity Tolerance;Impaired Coordination Left Upper Extremity;Impaired Sensation Left Upper Extremity;Impaired Upper Extremity Tone Left;Impaired Postural Control / Balance   Anticipated Discharge Equipment and Recommendations   DC Equipment Recommendations Unable to determine at this time   Discharge Recommendations Recommend post-acute placement for additional occupational therapy services prior to discharge home    Interdisciplinary Plan of Care Collaboration   IDT Collaboration with  Nursing;Physical Therapist;Family / Caregiver   Patient Position at End of Therapy In Bed;Bed Alarm On;Call Light within Reach;Tray Table within Reach;Phone within Reach   Collaboration Comments RN updated

## 2022-07-12 NOTE — HOSPITAL COURSE
"\"82 y.o. male who presented 7/11/2022 with history of A. fib DVT PE on Eliquis (has not taken for 2 days as he ran out of medicine), hypertension and DERREK presents with left-sided facial droop slurred speech and left-sided weakness when he awoke in the morning.  Neurology saw patient with dense right MCA syndrome with an NIH SS of 20, not a tPA candidate has greater than 4.5 hours and large area.  Did have MCA M1 segment thrombosis so going to IR for thrombectomy.\" - Dr. Aguila 7/11  "

## 2022-07-12 NOTE — ASSESSMENT & PLAN NOTE
S/p IR thrombectomy on 7/11.  Residual left sided weakness remains.  Likely thromboembolic from afib.  Unable to perform MRI  Repeat CT head showed hemorrhagic transformation, holding anticoagulation until 7/18  Maintain -160.  Lipitor.  ST recommended NPO, cortrak placed, fell out, patient refusing to have it put back in    7/17: comfort care initiated after family meeting

## 2022-07-12 NOTE — ASSESSMENT & PLAN NOTE
Maintained on Eliquis PTA.  Holding AC until 7/18 given repeat CT head results    Now on comfort care

## 2022-07-12 NOTE — THERAPY
"Speech Language Pathology   Clinical Swallow Evaluation     Patient Name: Alfred Diaz  AGE:  82 y.o., SEX:  male  Medical Record #: [id number]  Today's Date: 7/12/2022     Precautions  Precautions: Fall Risk    HPI: Per Hospitalist notes today-82 y.o. male who presented 7/11/2022 with initial left-sided weakness slurred speech.  He has a history of A. fib on Eliquis ran out of medication for 2 days prior to admission, DVT PE obstructive sleep apnea hypertension presented with left sided weakness facial droop slurred speech.  IR had vascular surgery consulted on 711 2 remove the 8 Serbian right carotid sheath that was placed for the thrombectomy.  Patient underwent right neck exploration to remove carotid sheath from the artery.  On my exam patient was very sleepy unable to awaken to light touch and exam.      PMHx:    See EMR    Level of Consciousness: Drowsy  Affect/Behavior: Calm  Follows Directives: Inconsistent  Hearing: Functional hearing        Prior Living Situation & Level of Function:  Nurs stating pt's wife is a retired nurse.       Oral Mechanism Evaluation  Facial Symmetry: Total L facial droop  Facial Sensation: Impaired  Labial Observations: L sided weakness  Lingual Observations: R lingual deviation  Dentition: Edentulous  Comments:      Voice  Quality: Hoarse  Intensity: Soft  Cough: Pt did not follow commands to assess  Comments:      Current Method of Nutrition   NPO until cleared by speech pathology      Subjective  Pt stating \"water\"       Assessment  Positioning: Aaron's (60-90 degrees)  Bolus Administration: SLP  Oxygen Requirements:    L Nasal Cannula  Factor(s) Affecting Performance: Impaired endurance, Impaired mental status    Swallowing Trials  Ice: Impaired      Comments:  Pt with dry oral cavity with oral care completed. Pt with hoarse vocal quality that alternates with whispering. Incision to right side of neck. Severe dysarthria with difficult to understand speech that improved " "mildly with single ice chips. Pt triggering 1 swallow in 5-10 seconds with single ice chip with no coughing post swallow.        Clinical Impressions  Pt with poor maintained eye opening, talking with eyes closed, and sleeping when not stimulated. Swallow eval limited to ice chips only related to pt's poor maintained wakefulness, severe dysarthria, and significant left facial weakness. SLP collaborated with nurs regarding 3 single ice chips per hour when pt awake and as tolerated. Swallow strategies posted regarding ice chips. Consider Cortrack placement as approp. SLP to see pt on Wed.        Recommendations  1.  NPO, very limited ice, consider Cortrack. High f/u Wed for SLP  2.  Instrumentation: Instrumental swallow study pending clinical progress  3.  Swallowing Instructions & Precautions:   Supervision: 1:1 feeding with constant supervision for limited ice  Positioning: Fully upright and midline during oral intake  Medication: Non Oral   Strategies: 3 single ice chips each hour with staff when pt awake.   Oral Care: Q4h     07/12/22 1059   Patient / Family Goals   Patient / Family Goal #1 \"water.\"   Goal #1 Outcome Goal not met   Short Term Goals   Short Term Goal # 1 Pt will maintain wakefulness for 30 min with moderate stimulation.   Goal Outcome # 1 Goal not met   Short Term Goal # 2 Pt will consume single ice chips at 3 per hour with staff without s/s of difficulty.   Goal Outcome # 2  Goal not met   Session Information   Priority 4  (5x, RMCA, CSE-3 single ice chips ea hr when awake. NO SOURCE NUTRITION, SEE WED.)         "

## 2022-07-13 NOTE — DISCHARGE PLANNING
Case Management Discharge Planning    Pt had thrombectomy on 7/11/22.  Admitted to ICU post procedure.  Swallow eval completed recommending ice chips and 1:1 feeding.  PT/OT recommend SNF on discharge at this time.  Inpatient rehab following for possible acceptance.      Notes re:  Inpt Rehab:  Dispo  -patient is currently functioning below his level of baseline, will need post acute rehab  - recommend IRF level therapy with 3hrs of therapy 5 days per week , however will need to demonstrate improved tolerance for therapies   - prior to acceptance to IRF will need SLP eval completed, transition to oral meds, and clarification of anticoag   - TCC to confirm family support and DC plan   - PM&R to follow peripherally     Admission Date: 7/11/2022  GMLOS: 4.1  ALOS: 2    6-Clicks ADL Score: 11  6-Clicks Mobility Score: 6  PT and/or OT Eval ordered: Yes  Post-acute Referrals Ordered: No, possible Inpt Rehab if strength increases.  Post-acute Choice Obtained: No  Has referral(s) been sent to post-acute provider:  No      Anticipated Discharge Dispo: Discharge Disposition: D/T to SNF with Medicare cert in anticipation of skilled care (03)    DME Needed: No    Action(s) Taken: Updated Provider/Nurse on Discharge Plan    Escalations Completed: None    Medically Clear: No    Next Steps: Follow to determine if patient will discharge to Inpt Rehab or SNF    Barriers to Discharge: Medical clearance    Is the patient up for discharge tomorrow: No

## 2022-07-13 NOTE — PROGRESS NOTES
[employer] Coverage Progress Note:     APRN notified of critically low serum calcium of 5.9 from 8.3. Last albumin 3.8 on 7/11/22. Other labs notable today for serum magnesium of 1.6, potassium 3.3, and phos of 2.7. Per chart review, patient did undergo exploratory neck procedure for carotid sheath removal on 7/11/22. Suspect parathyroid damage 2/2 surgery. Per the patient's bedside nurse, the patient is asymptomatic. A STAT EKG was obtained showing atrial fibrillation, rate 71 BPM, QTc 470.     Plan:   Ionized Ca with PTH level ordered   Replete magnesium with 2g IV mag sulfate   Discussed with inpatient pharmacy-- will replete hypocalcemia with calcium gluconate 1 g IV.   Monitor for digoxin toxicity in the setting of intravenous calcium infusion.     AMAYA Lazcano   [facility] Hospitalist

## 2022-07-13 NOTE — PROGRESS NOTES
Brief Neurology Note    Interval History 7/13/2022: Personally met Len Carreno, [employer] representative, and Ed, bedside RN at bedside. Multiple attempts to connect to the spinal cord stimulator via bluetooth and magnet were unsuccessful. Len stated the options regarding next steps for MRI imaging including either replacing the SCS battery then placing into MRI compatible mode versus completing MRI with SCS in current condition with risk of the SCS leads heating up and causing dural damage. The patient and I discussed the aforementioned options, and he declined proceeding with the MRI in the current condition. Agree with the patient's decision as the risk of MRI with the SCS outweighs the benefit of the study to the patient at this time.  Recommend aborting the MRI brain wo contrast with plan to repeat CT head wo contrast tomorrow morning to evaluate for ischemic stroke burden and/or hemorrhagic transformation.     Updated recommendations:  -Obtain CT head wo contrast tomorrow morning en lieu of MRI brain wo contrast   -Pending CTH imaging results to evaluate for stroke burden and/or hemorrhagic conversion to guide timing to restart anticoagulation with Eliquis given Afib for secondary stroke prevention    Case discussed with Dr. Navarro, Dr. Cm, and bedside RN.     Tremayne Maguire, MSN [facility]  Nurse Practitioner  [facility]  (t) [phone]

## 2022-07-13 NOTE — PALLIATIVE CARE
Palliative Care   Called daughter Nell, per chart wife Nona is hearing impaired. Introduced self and role of PC. Discussed scheduling a time for care conference to discuss GOC.    Scheduled care conference tomorrow at 1000.     Active listening, education, validation, normalization, therapeutic touch, and emotional support provided.     Updated:   Dr. Cm    Plan:   Meet with family tomorrow to discuss GOC at 1000.       Thank you for allowing Palliative Care to participate in this patient's care. Please feel free to call [phone] with any questions or concerns.

## 2022-07-13 NOTE — PROGRESS NOTES
Amaris from Lab called with critical result of Calcium 5.9 at 0508. Critical lab result read back to Amaris.   AMAYA Jasmine notified of critical lab result at 0508.  Critical lab result read back by AMAYA Jasmine.    Notified AMAYA Hong of critical lab

## 2022-07-13 NOTE — PROGRESS NOTES
Hospital Medicine Daily Progress Note    Date of Service  7/13/2022    Chief Complaint  Alfred Diaz is a 82 y.o. male admitted 7/11/2022 with stroke    Hospital Course  No notes on file    Interval Problem Update  No acute overnight events, unfortunately, patient remains n.p.o., unable to clear with speech therapy despite repeat about today.  IV fluids will be adjusted.  High likelihood for core track, patient is indicating nothing by nose waving off the possibility of NG tube placement.  Palliative care consulted and aware, family conference set up with family tomorrow.    Consultants/Specialty  Neuro  PM&R    Code Status  Full Code    Disposition  Pending at this juncture.    Review of Systems  All systems reviewed and negative except as noted per above.    Physical Exam  Temp:  [36.3 °C (97.3 °F)-36.9 °C (98.5 °F)] 36.4 °C (97.5 °F)  Pulse:  [54-82] 54  Resp:  [12-35] 13  BP: (139-179)/() 149/71  SpO2:  [94 %-98 %] 97 %    General appearance: NAD  Eyes: anicteric sclerae, moist conjunctivae; no lid-lag; PERRLA  HENT: Atraumatic; oropharynx clear with moist mucous membranes and no mucosal ulcerations; normal hard and soft palate  Neck: Trachea midline; FROM, supple, no thyromegaly or lymphadenopathy  Lungs: CTA, with normal respiratory effort and no intercostal retractions  CV: RRR, no MRGs  Abdomen: Soft, non-tender; no masses or HSM  Extremities: No peripheral edema or extremity lymphadenopathy  Skin: Normal temperature, turgor and texture; no rash, ulcers or subcutaneous nodules  Psych: Unable to be assessed      Current Facility-Administered Medications:   •  NS infusion, , Intravenous, Continuous, Tommy Cm M.D., Last Rate: 100 mL/hr at 07/13/22 0857, New Bag at 07/13/22 0857  •  digoxin (LANOXIN) tablet 250 mcg, 250 mcg, Oral, DAILY, Jeremy M Gonda, M.D.  •  DILTIAZem (CARDIZEM) tablet 120 mg, 120 mg, Oral, DAILY, Jeremy M Gonda, M.D.  •  furosemide (LASIX) tablet 40 mg, 40 mg, Oral,  DAILY, Jeremy M Gonda, M.D.  •  potassium chloride ER (KLOR-CON) tablet 20 mEq, 20 mEq, Oral, DAILY, Jeremy M Gonda, M.D.  •  senna-docusate (PERICOLACE or SENOKOT S) 8.6-50 MG per tablet 2 Tablet, 2 Tablet, Oral, BID **AND** polyethylene glycol/lytes (MIRALAX) PACKET 1 Packet, 1 Packet, Oral, QDAY PRN **AND** magnesium hydroxide (MILK OF MAGNESIA) suspension 30 mL, 30 mL, Oral, QDAY PRN **AND** bisacodyl (DULCOLAX) suppository 10 mg, 10 mg, Rectal, QDAY PRN, Candice Aguila M.D.  •  ipratropium-albuterol (DUONEB) nebulizer solution, 3 mL, Nebulization, Q4H PRN (RT), Candice Aguila M.D.  •  atorvastatin (LIPITOR) tablet 80 mg, 80 mg, Oral, Q EVENING, Candice Aguila M.D.  •  insulin regular (HumuLIN R,NovoLIN R) injection, 1-6 Units, Subcutaneous, Q6HRS, 1 Units at 07/11/22 6335 **AND** POC blood glucose manual result, , , Q6H **AND** NOTIFY MD and PharmD, , , Once **AND** Administer 20 grams of glucose (approximately 8 ounces of fruit juice) every 15 minutes PRN FSBG less than 70 mg/dL, , , PRN **AND** dextrose 50% (D50W) injection 25 g, 25 g, Intravenous, Q15 MIN PRN, Candice Aguial M.D.  •  Metoprolol Tartrate (LOPRESSOR) injection 5 mg, 5 mg, Intravenous, Q6HRS PRN, Candice Aguila M.D.  •  labetalol (NORMODYNE/TRANDATE) injection 10 mg, 10 mg, Intravenous, Q10 MIN PRN, José Antonio Madsen M.D.  •  hydrALAZINE (APRESOLINE) injection 10 mg, 10 mg, Intravenous, Q2HRS PRN, José Antonio Madsen M.D.      Fluids    Intake/Output Summary (Last 24 hours) at 7/13/2022 1421  Last data filed at 7/13/2022 1200  Gross per 24 hour   Intake 405 ml   Output 550 ml   Net -145 ml       Laboratory  Recent Labs     07/11/22  0736 07/12/22  0238 07/13/22  0647   WBC 7.3 11.9* 11.9*   RBC 5.36 5.31 5.16   HEMOGLOBIN 16.9 16.9 16.4   HEMATOCRIT 50.9 49.8 48.9   MCV 95.0 93.8 94.8   MCH 31.5 31.8 31.8   MCHC 33.2* 33.9 33.5*   RDW 49.8 49.1 50.9*   PLATELETCT 166 191 161*   MPV 10.9 10.9 11.1     Recent Labs      07/13/22  0415 07/13/22  0647 07/13/22  1230   SODIUM 143 142 144   POTASSIUM 3.3* 4.2 4.1   CHLORIDE 113* 105 108   CO2 19* 25 25   GLUCOSE 95 115* 113*   BUN 20 25* 25*   CREATININE 0.63 0.95 0.87   CALCIUM 5.9* 8.4* 8.3*     Recent Labs     07/11/22  0736   APTT 28.9   INR 1.03         Recent Labs     07/12/22  0238   TRIGLYCERIDE 45   HDL 59   *       Imaging  CT-HEAD W/O   Final Result      Subacute right MCA territory, temporal lobe, infarction with some new cortical thickening, worsening sulcal effacement/mass effect. No macro hemorrhagic transformation is identified      DX-CHEST-PORTABLE (1 VIEW)   Final Result         Diffuse interstitial prominence could relate to mild fluid overload.      Elevation of the left hemidiaphragm with left basilar atelectasis.      IR-NEURO INTERVENTIONAL CONSULT-IP   Final Result      1.  Occluded right M1 segment.   2.  Right middle cerebral artery thrombectomy through the right carotid access-TICI 2 b flow.      CT-CTA NECK WITH & W/O-POST PROCESSING   Final Result      CT angiogram of the neck within normal limits.      CT-CEREBRAL PERFUSION ANALYSIS   Final Result      1.Cerebral blood flow less than 30% likely representing completed infarct = 148 mL   2.T Max more than 6 seconds likely representing combination of completed infarct and ischemia = 0 mL   3. Mismatched volume likely representing ischemic brain/penumbra= 148 mL   4.Please note that the cerebral perfusion was performed on the limited brain tissue around the basal ganglia region. Infarct/ischemia outside the CT perfusion sections may not be seen on this study.      CT-CTA HEAD WITH & W/O-POST PROCESS   Final Result      RIGHT M1 occlusion      Findings were communicated with and acknowledged by MICA POON via [employer] on 7/11/2022 8:07 AM.      CT-HEAD W/O   Final Result      1. Hyperdense right MCA is suspicious for right MCA occlusion. Attention on CTA.   2. Slightly decreased gray-white  differentiation in the right temporal lobe, which may be due to an acute infarct.   3. Mild global parenchymal atrophy. Chronic small vessel ischemic changes.      MR-BRAIN-W/O    (Results Pending)   MR-BRAIN-W/O    (Results Pending)        Assessment/Plan  * Acute right arterial ischemic stroke, MCA (middle cerebral artery) (HCC)  Assessment & Plan  S/p IR thrombectomy on 7/11.  Residual left sided weakness remains.  Likely thromboembolic from afib.  Pending MRI brain to ensure no hemorrhagic conversion prior to starting AC.  Awaiting assistance of medical device tech to place nerve stimulator into MRI compatible mode prior to MRI.  Maintain -160.  lipitor.    DERREK (obstructive sleep apnea)- (present on admission)  Assessment & Plan  Continue CPAP. Will order for CPAP qhs.  Likely reason patient with hypersomnolence.    Pulmonary embolus (HCC)- (present on admission)  Assessment & Plan  History of, maintained with eliquis, but patient stopped 2 days PTA.   post op vascular retrieval right carotid artery sheath from IR thrombectomy.  Neurology recommending MRI brain prior to restarting AC to ensure no bleed present.    Atrial fibrillation (HCC)- (present on admission)  Assessment & Plan  Maintained on Eliquis PTA.  MRI brain pending start of AC per neurology.

## 2022-07-13 NOTE — CARE PLAN
The patient is Stable - Low risk of patient condition declining or worsening    Shift Goals  Clinical Goals: Stable neuro assessments  Patient Goals: Sleep  Family Goals: FABIAN    Progress made toward(s) clinical / shift goals:  Stable neuro assessments Q4 hours as ordered thus far. Patient has been able to sleep.

## 2022-07-13 NOTE — PROGRESS NOTES
We received a call from RN @ [phone] 7-13-22 about stimulator. The rep Len came out and was unable to connect  To the device. This make the device unsafe to scan at this time.

## 2022-07-13 NOTE — PROGRESS NOTES
Neurology Progress Note  [facility] Acute Neurology    Referring Physician: Tommy Cm M.D.    CC: Left facial droop, slurred speech, left sided weakness    HPI: Refer to initial documented Neurology H&P, as detailed in the patient's chart.    Interval History 7/13/2022: Patient found to critically low serum calcium overnight 5.9, treated with calcium gluconate. PTH and ionized calcium were normal. No acute neurological changes. Patient is currently laying in bed, awakens to verbal stimuli, following commands, and fully oriented. Persistent right MCA syndrome with improvement of left gaze preference and left hemisensory neglect. Denies headache, vision changes, aphasia, abnormal movements, LOC changes, memory loss, or confusion. Awaiting clearance of [facility] spinal cord stimulator for MRI brain wo contrast, pending today.     Past Medical History:   Past Medical History:   Diagnosis Date   • A-fib ([facility])    • Apnea, sleep    • Arthritis     osteo, bilateral knees, back   • Atrial fibrillation ([facility])    • Back pain    • Back pain    • Chickenpox    • Chronic anticoagulation    • Dental disorder     Full dentures   • DVT (deep venous thrombosis) ([facility])    • Eczema    • Falls    • Frequent urination    • [place] measles    • Hearing difficulty    • Hypertension    • Mumps    • DERREK (obstructive sleep apnea)    • PAH (pulmonary artery hypertension) ([facility])    • Painful joint     Bilateral knees   • PE (pulmonary thromboembolism) ([facility])    • Pulmonary embolism ([facility])    • Rash    • Shortness of breath    • Sleep apnea    • Snoring    • Swelling of lower extremity    • Venous insufficiency         FHx:  Family History   Problem Relation Age of Onset   • Cancer Mother    • Lung Disease Father         SHx:  Social History     Socioeconomic History   • Marital status:      Spouse name: Not on file   • Number of children: Not on file   • Years of education: Not on file   • Highest education level: Not on file   Occupational  History   • Not on file   Tobacco Use   • Smoking status: Former Smoker     Packs/day: 0.00     Years: 3.00     Pack years: 0.00     Types: Cigars     Quit date: 1955     Years since quittin.7   • Smokeless tobacco: Never Used   Vaping Use   • Vaping Use: Never used   Substance and Sexual Activity   • Alcohol use: Not Currently     Alcohol/week: 4.2 oz     Types: 7 Standard drinks or equivalent per week   • Drug use: Never   • Sexual activity: Not on file   Other Topics Concern   • Not on file   Social History Narrative   • Not on file     Social Determinants of Health     Financial Resource Strain: Not on file   Food Insecurity: Not on file   Transportation Needs: Not on file   Physical Activity: Not on file   Stress: Not on file   Social Connections: Not on file   Intimate Partner Violence: Not on file   Housing Stability: Not on file        Medications:    Current Facility-Administered Medications:   •  NS infusion, , Intravenous, Continuous, Tommy Cm M.D., Last Rate: 100 mL/hr at 22, New Bag at 22  •  digoxin (LANOXIN) tablet 250 mcg, 250 mcg, Oral, DAILY, Jeremy M Gonda, M.D.  •  DILTIAZem (CARDIZEM) tablet 120 mg, 120 mg, Oral, DAILY, Jeremy M Gonda, M.D.  •  furosemide (LASIX) tablet 40 mg, 40 mg, Oral, DAILY, Jeremy M Gonda, M.D.  •  potassium chloride ER (KLOR-CON) tablet 20 mEq, 20 mEq, Oral, DAILY, Jeremy M Gonda, M.D.  •  senna-docusate (PERICOLACE or SENOKOT S) 8.6-50 MG per tablet 2 Tablet, 2 Tablet, Oral, BID **AND** polyethylene glycol/lytes (MIRALAX) PACKET 1 Packet, 1 Packet, Oral, QDAY PRN **AND** magnesium hydroxide (MILK OF MAGNESIA) suspension 30 mL, 30 mL, Oral, QDAY PRN **AND** bisacodyl (DULCOLAX) suppository 10 mg, 10 mg, Rectal, QDAY PRN, Candice Aguila M.D.  •  ipratropium-albuterol (DUONEB) nebulizer solution, 3 mL, Nebulization, Q4H PRN (RT), Candice Aguila M.D.  •  atorvastatin (LIPITOR) tablet 80 mg, 80 mg, Oral, Q EVENING, Candice SINCLAIR  AURORA Aguila  •  insulin regular (HumuLIN R,NovoLIN R) injection, 1-6 Units, Subcutaneous, Q6HRS, 1 Units at 07/11/22 2355 **AND** POC blood glucose manual result, , , Q6H **AND** NOTIFY MD and PharmD, , , Once **AND** Administer 20 grams of glucose (approximately 8 ounces of fruit juice) every 15 minutes PRN FSBG less than 70 mg/dL, , , PRN **AND** dextrose 50% (D50W) injection 25 g, 25 g, Intravenous, Q15 MIN PRN, Candice Aguila M.D.  •  Metoprolol Tartrate (LOPRESSOR) injection 5 mg, 5 mg, Intravenous, Q6HRS PRN, Candice Aguila M.D.  •  labetalol (NORMODYNE/TRANDATE) injection 10 mg, 10 mg, Intravenous, Q10 MIN PRN, José Antonio Madsen M.D.  •  hydrALAZINE (APRESOLINE) injection 10 mg, 10 mg, Intravenous, Q2HRS PRN, José Antonio Madsen M.D.    Allergies:  Allergies   Allergen Reactions   • Vicodin [Apap-Fd&C Yellow #10 Al Aragon-Hydrocodone] Rash     Rash        Review of systems: In addition to what is detailed in the interval history above, all other systems reviewed and are negative.    Physical Examination:   Vitals:    07/13/22 0800 07/13/22 0900 07/13/22 1000 07/13/22 1100   BP: (!) 154/95 (!) 142/66 (!) 154/83 (!) 154/83   Pulse: 63 60 60 71   Resp: (!) 22 (!) 25 14 (!) 27   Temp:       TempSrc:       SpO2: 94% 95% 97% 96%   Weight:       Height:         General: Patient in no acute distress, pleasant and cooperative.  HEENT: Normocephalic, no signs of acute trauma.   Neck: Supple, no meningeal signs or carotid bruits. There is normal range of motion. No tenderness on exam.   Chest: Regular and unlabored breaths on 2L NC. No cough.   CV: RRR. Peripheral pitting edema to BLE.  Skin: No signs of acute rashes or trauma.   Musculoskeletal: Joints exhibit full range of motion, without any pain to palpation. There are no signs of joint or muscle swelling. There is no tenderness to deep palpation of muscles.   Psychiatric: No hallucinatory behavior. Denies symptoms of depression or suicidal ideation. Mood and  affect appear normal on exam.      NEUROLOGICAL EXAM:   Mental status, orientation: Drowsy, awakens to verbal stimuli, following commands, and fully oriented.   Speech and language: Speech is moderately dysarthric and fluent. The patient is able to name, repeat, and comprehend.   Memory: There is intact recollection of recent and remote events.   Cranial nerve exam: Pupils are 3-4 mm bilaterally and equally reactive to light and accommodation. Visual fields full. There is no nystagmus on primary or secondary gaze. Right gaze preference, able to overcome midline but not bury sclera to left. Face appears asymmetric with left lower droop. Sensation in the face is decreased to left face on light touch. Uvula is midline. Palate elevates symmetrically. Tongue is midline and without any signs of tongue biting or fasciculations.    Motor exam: Strength is antigravity to RUE and RLE without drift appreciated, LUE and LLE withdrawal to noxious stimuli. Tone is normal. No abnormal movements were seen on exam.   Sensory exam: Decreased sensation to LUE and LLE with noxious stimuli compared to left. Left hemisensory neglect not present double stimuli, and able to recognize his hand today.   Deep tendon reflexes:  Plantar responses are up-going to left. There is no clonus.   Coordination: No ataxia with normal finger-nose-finger to RUE, unable to complete to LUE secondary to hemiplegia.   Gait: Deferred per patient preference.      NIH Stroke Scale  7/13/22    1a. Level of Consciousness (Alert, drowsy, etc): 1= Drowsy    1b. LOC Questions (Month, age): 0= Answers both correctly    1c. LOC Commands (Open/close eyes make fist/let go): 0= Obeys both correctly    2.   Best Gaze (Eyes open - patient follows examiner's finger on face): 1= Partial gaze palay    3.   Visual Fields (introduce visual stimulus/threat to patient's field quadrants): 0= No visual loss    4.   Facial Paresis (Show teeth, raise eyebrows and squeeze eyes shut): 2  = Partial     5a. Motor Arm - Left (Elevate arm to 90 degrees if patient is sitting, 45 degrees if  supine): 3= No effort against gravity    5b. Motor Arm - Right (Elevate arm to 90 degrees if patient is sitting, 45 degrees if supine): 0= No drift    6a. Motor Leg - Left (Elevate leg 30 degrees with patient supine): 3= No effort against gravity    6b. Motor Leg - Right  (Elevate leg 30 degrees with patient supine): 0= No drift    7.   Limb Ataxia (Finger-nose, heel down shin): 0= No ataxia    8.   Sensory (Pin prick to face, arm, trunk and leg - compare side to side): 1= Partial loss    9.  Best Language (Name item, describe a picture and read sentences): 0= No aphasia    10. Dysarthria (Evaluate speech clarity by patient repeating listed words): 1= Mild to moderate slurring    11. Extinction and Inattention (Use information from prior testing to identify neglect or  double simultaneous stimuli testing): 0= No neglect    Total NIH Score: 12          Ancillary Data Reviewed:    Labs:  Lab Results   Component Value Date/Time    PROTHROMBTM 13.4 07/11/2022 07:36 AM    INR 1.03 07/11/2022 07:36 AM      Lab Results   Component Value Date/Time    WBC 11.9 (H) 07/13/2022 06:47 AM    RBC 5.16 07/13/2022 06:47 AM    HEMOGLOBIN 16.4 07/13/2022 06:47 AM    HEMATOCRIT 48.9 07/13/2022 06:47 AM    MCV 94.8 07/13/2022 06:47 AM    MCH 31.8 07/13/2022 06:47 AM    MCHC 33.5 (L) 07/13/2022 06:47 AM    MPV 11.1 07/13/2022 06:47 AM    NEUTSPOLYS 75.20 (H) 07/11/2022 07:36 AM    LYMPHOCYTES 16.30 (L) 07/11/2022 07:36 AM    MONOCYTES 7.00 07/11/2022 07:36 AM    EOSINOPHILS 0.70 07/11/2022 07:36 AM    BASOPHILS 0.50 07/11/2022 07:36 AM      Lab Results   Component Value Date/Time    SODIUM 142 07/13/2022 06:47 AM    POTASSIUM 4.2 07/13/2022 06:47 AM    CHLORIDE 105 07/13/2022 06:47 AM    CO2 25 07/13/2022 06:47 AM    GLUCOSE 115 (H) 07/13/2022 06:47 AM    BUN 25 (H) 07/13/2022 06:47 AM    CREATININE 0.95 07/13/2022 06:47 AM      Lab  Results   Component Value Date/Time    CHOLSTRLTOT 195 07/12/2022 02:38 AM     (H) 07/12/2022 02:38 AM    HDL 59 07/12/2022 02:38 AM    TRIGLYCERIDE 45 07/12/2022 02:38 AM       Lab Results   Component Value Date/Time    ALKPHOSPHAT 94 07/13/2022 06:47 AM    ASTSGOT 19 07/13/2022 06:47 AM    ALTSGPT 16 07/13/2022 06:47 AM    TBILIRUBIN 0.9 07/13/2022 06:47 AM        Imaging/Testing:    I interpreted and/or reviewed the patient's neuroimaging    CT-HEAD W/O   Final Result      Subacute right MCA territory, temporal lobe, infarction with some new cortical thickening, worsening sulcal effacement/mass effect. No macro hemorrhagic transformation is identified      DX-CHEST-PORTABLE (1 VIEW)   Final Result         Diffuse interstitial prominence could relate to mild fluid overload.      Elevation of the left hemidiaphragm with left basilar atelectasis.      IR-NEURO INTERVENTIONAL CONSULT-IP   Final Result      1.  Occluded right M1 segment.   2.  Right middle cerebral artery thrombectomy through the right carotid access-TICI 2 b flow.      CT-CTA NECK WITH & W/O-POST PROCESSING   Final Result      CT angiogram of the neck within normal limits.      CT-CEREBRAL PERFUSION ANALYSIS   Final Result      1.Cerebral blood flow less than 30% likely representing completed infarct = 148 mL   2.T Max more than 6 seconds likely representing combination of completed infarct and ischemia = 0 mL   3. Mismatched volume likely representing ischemic brain/penumbra= 148 mL   4.Please note that the cerebral perfusion was performed on the limited brain tissue around the basal ganglia region. Infarct/ischemia outside the CT perfusion sections may not be seen on this study.      CT-CTA HEAD WITH & W/O-POST PROCESS   Final Result      RIGHT M1 occlusion      Findings were communicated with and acknowledged by MICA POON via [employer] on 7/11/2022 8:07 AM.      CT-HEAD W/O   Final Result      1. Hyperdense right MCA is suspicious for  right MCA occlusion. Attention on CTA.   2. Slightly decreased gray-white differentiation in the right temporal lobe, which may be due to an acute infarct.   3. Mild global parenchymal atrophy. Chronic small vessel ischemic changes.      MR-BRAIN-W/O    (Results Pending)   MR-BRAIN-W/O    (Results Pending)       Assessment and Plan:    Alfred Diaz is a 82 y.o. male with relevant history of Afib on Eliquis (last dose approximately 2 days ago after running out of medication), DVT, PE, DERREK, and hypertension presenting to the hospital for left facial droop, slurred speech, and left sided weakness for which neurology was consulted for acute stroke. Neurological exam initially was significant for a dense right MCA syndrome (NIHSS 20). CT head wo contrast showed no acute hemorrhage with hyperdense right MCA and subtle decreased gray-white matter differentiation. CTA head and neck w/wo contrast showed a large vessel occlusion of the right MCA, M1 segment. CTP showed a favorable perfusion defect of the right MCA territory without a core infarct. Dr. Madsen, Neuro IR, was notified and accepted the patient for an emergent thrombectomy for endovascular clot retrieval. The patient was not a IV thrombolytic candidate as last known well was greater than 4.5 hours with unknown onset time of symptoms with patient awakening with right MCA syndrome. Etiology is most likely cardioembolic given his Afib history off anticoagulation (Eliquis).     Neurological exam shows some improvements today in regards to his right gaze preference and left sided hemisensory neglect being nearly resolved, but with persistent left UMN facial droop, left hemiparesis, and left sided numbness.      Plan:     -q4h and PRN neuro assessment. VS per nursing/unit protocol.   -Maintain systolic -160 given TICI 2b reperfusion. This is in an effort to minimize reperfusion injury and/or hemorrhagic conversion. Antihypertensives per ICU  team.   -Obtain MRI Brain wo contrast- patient has [facility] [facility] spinal cord stimulator, awaiting call back from representative for assistance  -Hold anticoagulation pending MRI brain imaging to evaluate for stroke burden and/or hemorrhagic conversion to guide timing to restart anticoagulation.   -Telemetry; currently Afib. Known history of Afib/arrhythmia.   -Continue Atorvastatin 80 mg PO q HS for LDL goal <70. Note .    -BG management per primary team. BG goal . Note hemoglobin A1c is 5.6, within goal <7%   -PT/OT/SLP eval and treat.   -Will follow up with results of the above and make additional recommendations accordingly.    -DVT PPX: SCDs.     The evaluation of the patient, and recommended management, was discussed with Dr. Navarro, Dr. Gonda, and bedside RN. I have performed a physical exam and reviewed and updated ROS and Plan today (7/13/2022). In review of yesterday's note (7/12/2022), there are no changes except as documented above.    Tremayne Maguire, MSN [facility]  Nurse Practitioner  [facility]  (t) [phone]

## 2022-07-13 NOTE — PROGRESS NOTES
Physical Medicine and Rehabilitation Consultation  Follow up note           Date of initial consultation: 7/12/2022  Requesting provider: Candice Aguila MD   Consulting provider: Nancy Alicea D.O.  Reason for consultation: assess for acute inpatient rehab appropriateness  LOS: 2 Day(s)    Chief complaint: Left sided weakness, slurred speech     HPI: The patient is a 82 y.o.  male with a past medical history of afib on eliquis (ran out of medication 2 days prior to admission), DVT, PE, DERREK, and HTN ;  who presented on 7/11/2022  7:39 AM with new onset left sided weakness, facial droop, and slurred speech. At time of evaluation in the ED BP was 180/100, and NIHSS 20. CT head obtained showed no acute intracranial abnormalities. CTA head and neck showed a large vessel occlusion of the R MCA, M1 segment. CT perfusion scan showed defect of the R MCA territory. Neurology was consulted and neuro IR consulted, patient was deemed appropriate for an emergent thrombectomy. Patient was taken to IR on 7/11 for mechanical thrombectomy of the R M1 occlusion, TICI 2b. Patient was admitted to the ICU for further monitoring.  S/p  Mechanical thrombectomy, patient has been very sleepy, but able to interact with therapy and nursing.     7/12: Patient seen and examined at bedside with wife, daughter, PT and OT at bedside. Patient continues to have left sided weakness and slurred speech. Denies HA, lightheadedness, or dizziness. Is very tired, closes eyes during visit, however he does make jokes.     7/13: Patient seen and examined with wife at side. Patient very fatigued today. Patient keeps eyes closed during visit. Was more awake yesterday. Patient denies pain or discomfort, reports continued Left sided weakness. No other complaints.     Social Hx:  Patient lives with wife in a 1 story house with one small step to enter.   1 ROSARIO  At prior level of function patient was independent with mobility and ADLs, used a SPC in the  community.       Tobacco: Denies  Alcohol: Denies  Drugs: Denies     THERAPY:  Restrictions: Fall risk, NPO   PT: Functional mobility    PT Note : Max A bed mobility, Max A sit to stand, was unable to transfer     OT: ADLs   OT Note : Max A upper and lower body dressing     SLP:    SLP Note : NPO, very limited ice, Poor level of attention during SLP.     IMAGIN/12 CT HEAD  IMPRESSION:     Subacute right MCA territory, temporal lobe, infarction with some new cortical thickening, worsening sulcal effacement/mass effect. No macro hemorrhagic transformation is identified    PROCEDURES:   Mechanical thrombectomy for R M1 occlusion, TICI 2b performed by Dr. Madsen     PMH:  Past Medical History:   Diagnosis Date   • A-fib ([facility])    • Apnea, sleep    • Arthritis     osteo, bilateral knees, back   • Atrial fibrillation ([facility])    • Back pain    • Back pain    • Chickenpox    • Chronic anticoagulation    • Dental disorder     Full dentures   • DVT (deep venous thrombosis) ([facility])    • Eczema    • Falls    • Frequent urination    • [place] measles    • Hearing difficulty    • Hypertension    • Mumps    • DERREK (obstructive sleep apnea)    • PAH (pulmonary artery hypertension) ([facility])    • Painful joint     Bilateral knees   • PE (pulmonary thromboembolism) ([facility])    • Pulmonary embolism ([facility])    • Rash    • Shortness of breath    • Sleep apnea    • Snoring    • Swelling of lower extremity    • Venous insufficiency        PSH:  Past Surgical History:   Procedure Laterality Date   • NC THROMBOENDARTECTMY NECK,NECK INCIS Right 2022    Procedure: RIGHT NECK EXPLORATION; REMOVAL OF RIGHT CAROTID SHEATH;  Surgeon: Sebastian Robertson M.D.;  Location: SURGERY [facility];  Service: Vascular   • NC LAP,INGUINAL HERNIA REPR,INITIAL Right 3/23/2022    Procedure: REPAIR, HERNIA, INGUINAL, ROBOT-ASSISTED, USING DA ANDRA XI - LARGE;  Surgeon: Itzel Bennett M.D.;  Location: SURGERY [facility];  Service: Gen Robotic   •  "ARTHROPLASTY Bilateral     knees   • ARTHROSCOPY, KNEE     • CATARACT EXTRACTION WITH IOL Bilateral    • INSERTION PERMANENT SPINAL CORD STIMULATOR     • LAMINOTOMY      lumbar   • TONSILLECTOMY     • TURP-VAPOR     • UROLOGY SURGERY         FHX:  Family History   Problem Relation Age of Onset   • Cancer Mother    • Lung Disease Father        Medications:  Current Facility-Administered Medications   Medication Dose   • NS infusion     • digoxin (LANOXIN) tablet 250 mcg  250 mcg   • DILTIAZem (CARDIZEM) tablet 120 mg  120 mg   • furosemide (LASIX) tablet 40 mg  40 mg   • potassium chloride ER (KLOR-CON) tablet 20 mEq  20 mEq   • senna-docusate (PERICOLACE or SENOKOT S) 8.6-50 MG per tablet 2 Tablet  2 Tablet    And   • polyethylene glycol/lytes (MIRALAX) PACKET 1 Packet  1 Packet    And   • magnesium hydroxide (MILK OF MAGNESIA) suspension 30 mL  30 mL    And   • bisacodyl (DULCOLAX) suppository 10 mg  10 mg   • ipratropium-albuterol (DUONEB) nebulizer solution  3 mL   • atorvastatin (LIPITOR) tablet 80 mg  80 mg   • insulin regular (HumuLIN R,NovoLIN R) injection  1-6 Units    And   • dextrose 50% (D50W) injection 25 g  25 g   • Metoprolol Tartrate (LOPRESSOR) injection 5 mg  5 mg   • labetalol (NORMODYNE/TRANDATE) injection 10 mg  10 mg   • hydrALAZINE (APRESOLINE) injection 10 mg  10 mg       Allergies:  Allergies   Allergen Reactions   • Vicodin [Apap-Fd&C Yellow #10 Al Aragon-Hydrocodone] Rash     Rash       Physical Exam:  Vitals: BP (!) 142/66   Pulse 60   Temp 36.3 °C (97.3 °F) (Temporal)   Resp (!) 25   Ht 1.93 m (6' 3.98\")   Wt 110 kg (242 lb 8.1 oz)   SpO2 95%   Gen: NAD, laying comfortably in bed, wife in room   Head: NC/AT   Eyes/ Nose/ Mouth: PERRLA, moist mucous membranes, keeps eyes closed, fatigued  Cardio: RRR, good distal perfusion, warm extremities  Pulm: normal respiratory effort, no cyanosis   Abd: Soft NTND, negative borborygmi   Ext: No peripheral edema. No calf tenderness. No " clubbing.    Mental status:  A&Ox4 (person, place, date, situation) answers questions appropriately follows commands on R side promptly   Speech: fluent, no aphasia or + dysarthria     CRANIAL NERVES:  2,3: visual acuity grossly intact, PERRL  3,4,6: EOMI bilaterally, no nystagmus or diplopia  5: sensation intact to light touch bilaterally and symmetric  7: + left sided facial droop   8: hearing grossly intact  9,10: symmetric palate elevation      Motor: poor participation with left side, very fatigued       Upper Extremity  Myotome R L   Shoulder flexion C5 5/5 0/5   Elbow flexion C5 5/5 1/5   Wrist extension C6 5/5 1/5   Elbow extension C7 5/5 1/5   Finger flexion C8 5/5 1/5   Finger abduction T1 5/5 1/5     Lower Extremity Myotome R L   Hip flexion L2 5/5 0/5   Knee extension L3 5/5 0/5   Ankle dorsiflexion L4 5/5 0/5   Toe extension L5 5/5 0/5   Ankle plantarflexion S1 5/5 1/5       Sensory:   intact to light touch through out RUE, decreased sensation to LLE     DTRs: 2+ in RUE bicep, 1+ LUE bicep   No clonus at bilateral ankles  Negative Shaikh b/l     Tone: no spasticity noted, no cogwheeling noted    Coordination:   intact finger to nose RUE   intact fine motor with fingers RUE       Labs: Reviewed and significant for   Recent Labs     07/11/22  0736 07/12/22 0238 07/13/22  0647   RBC 5.36 5.31 5.16   HEMOGLOBIN 16.9 16.9 16.4   HEMATOCRIT 50.9 49.8 48.9   PLATELETCT 166 191 161*   PROTHROMBTM 13.4  --   --    APTT 28.9  --   --    INR 1.03  --   --      Recent Labs     07/12/22  0238 07/13/22  0415 07/13/22  0647   SODIUM 142 143 142   POTASSIUM 4.2 3.3* 4.2   CHLORIDE 106 113* 105   CO2 22 19* 25   GLUCOSE 154* 95 115*   BUN 18 20 25*   CREATININE 0.77 0.63 0.95   CALCIUM 8.3* 5.9* 8.4*     Recent Results (from the past 24 hour(s))   POCT glucose device results    Collection Time: 07/12/22 12:38 PM   Result Value Ref Range    POC Glucose, Blood 129 (H) 65 - 99 mg/dL   POCT glucose device results     Collection Time: 07/12/22  6:18 PM   Result Value Ref Range    POC Glucose, Blood 103 (H) 65 - 99 mg/dL   POCT glucose device results    Collection Time: 07/12/22 11:40 PM   Result Value Ref Range    POC Glucose, Blood 107 (H) 65 - 99 mg/dL   Basic Metabolic Panel (BMP)    Collection Time: 07/13/22  4:15 AM   Result Value Ref Range    Sodium 143 135 - 145 mmol/L    Potassium 3.3 (L) 3.6 - 5.5 mmol/L    Chloride 113 (H) 96 - 112 mmol/L    Co2 19 (L) 20 - 33 mmol/L    Glucose 95 65 - 99 mg/dL    Bun 20 8 - 22 mg/dL    Creatinine 0.63 0.50 - 1.40 mg/dL    Calcium 5.9 (LL) 8.5 - 10.5 mg/dL    Anion Gap 11.0 7.0 - 16.0   Magnesium    Collection Time: 07/13/22  4:15 AM   Result Value Ref Range    Magnesium 1.6 1.5 - 2.5 mg/dL   PHOSPHORUS    Collection Time: 07/13/22  4:15 AM   Result Value Ref Range    Phosphorus 2.7 2.5 - 4.5 mg/dL   ESTIMATED GFR    Collection Time: 07/13/22  4:15 AM   Result Value Ref Range    GFR (CKD-EPI) 94 >60 mL/min/1.73 m 2   CBC WITHOUT DIFFERENTIAL    Collection Time: 07/13/22  6:47 AM   Result Value Ref Range    WBC 11.9 (H) 4.8 - 10.8 K/uL    RBC 5.16 4.70 - 6.10 M/uL    Hemoglobin 16.4 14.0 - 18.0 g/dL    Hematocrit 48.9 42.0 - 52.0 %    MCV 94.8 81.4 - 97.8 fL    MCH 31.8 27.0 - 33.0 pg    MCHC 33.5 (L) 33.7 - 35.3 g/dL    RDW 50.9 (H) 35.9 - 50.0 fL    Platelet Count 161 (L) 164 - 446 K/uL    MPV 11.1 9.0 - 12.9 fL   PTH WITH IONIZED CALCIUM    Collection Time: 07/13/22  6:47 AM   Result Value Ref Range    Ionized Calcium 1.1 1.1 - 1.3 mmol/L   Comp Metabolic Panel    Collection Time: 07/13/22  6:47 AM   Result Value Ref Range    Sodium 142 135 - 145 mmol/L    Potassium 4.2 3.6 - 5.5 mmol/L    Chloride 105 96 - 112 mmol/L    Co2 25 20 - 33 mmol/L    Anion Gap 12.0 7.0 - 16.0    Glucose 115 (H) 65 - 99 mg/dL    Bun 25 (H) 8 - 22 mg/dL    Creatinine 0.95 0.50 - 1.40 mg/dL    Calcium 8.4 (L) 8.5 - 10.5 mg/dL    AST(SGOT) 19 12 - 45 U/L    ALT(SGPT) 16 2 - 50 U/L    Alkaline Phosphatase 94 30  - 99 U/L    Total Bilirubin 0.9 0.1 - 1.5 mg/dL    Albumin 3.8 3.2 - 4.9 g/dL    Total Protein 5.9 (L) 6.0 - 8.2 g/dL    Globulin 2.1 1.9 - 3.5 g/dL    A-G Ratio 1.8 g/dL   ESTIMATED GFR    Collection Time: 22  6:47 AM   Result Value Ref Range    GFR (CKD-EPI) 79 >60 mL/min/1.73 m 2   POCT glucose device results    Collection Time: 22  6:50 AM   Result Value Ref Range    POC Glucose, Blood 107 (H) 65 - 99 mg/dL   EKG    Collection Time: 22  6:53 AM   Result Value Ref Range    Report       [facility]    Test Date:  2022  Pt Name:    SARAHI MEDINA               Department: [facility]  MRN:        [id number]                      Room:       [facility]  Gender:     Male                         Technician: CARRIE  :        [id number]                   Requested By:TARIK KING  Order #:    [id number]                    Reading MD: Anton Pineda MD    Measurements  Intervals                                Axis  Rate:       71                           P:  WV:                                      QRS:        51  QRSD:       100                          T:          224  QT:         432  QTc:        470    Interpretive Statements  ATRIAL FIBRILLATION  REPOL ABNRM SUGGESTS ISCHEMIA, ANT-LAT LEADS  Compared to ECG 2022 11:32:18  Early repolarization now present  Possible ischemia now present  Electronically Signed On 2022 6:58:53 PDT by Anton Pineda MD           ASSESSMENT:  Patient is a 82 y.o. male admitted with left sided weakness due to R M1 occlusion     [facility] Code / Diagnosis to Support: 0001.1 - Stroke: Left Body Involvement (Right Brain)    Rehabilitation: Impaired ADLs and mobility  Patient is a good candidate for inpatient rehab based on needs for PT, OT, and speech therapy, see dispo details below.     Barriers to transfer include: Insurance authorization, TCCs to verify disposition, medical clearance and bed availability     Additional Recommendations:   R MCA CVA  - greatest deficits  are L sided weakness, dysarthria and dysphagia  - s/p mechanical thrombectomy on 7/11 with TICI 2b   - neuro consulted, repeat CT head 7/12  with evidence of R MCA infarct, no evidence of hemorrhage   - etiology of CVA likely cardiemoblic, has missed 2 days of eliquis   - currently on statin, will need confirmation of start date for anticoag after mechanical thrombectomy   - continue with PT/OT/SLP    Dysphagia  - patient currently NPO, poor alertness during SLP eval on 7/12  - anticipate SLP eval today 7/13  - high likelihood for cortrak, will discuss with SLP anticipated level of need for PEG      Afib  - on eliquis at home, currently held   - on digoxin and diltiazem, monitor for dig level      HTN  - currently in IV antihypertensive, likely due to NPO status  - will need BP well controlled on oral meds prior to acceptance to IRF     Dispo  -patient is currently functioning below his level of baseline, will need post acute rehab  - recommend IRF level therapy with 3hrs of therapy 5 days per week , however will need to demonstrate improved tolerance for therapies   - prior to acceptance to IRF will need SLP eval completed, transition to oral meds, and clarification of anticoag   - TCC to confirm family support and DC plan   - PM&R to follow peripherally       Medical Complexity:  R MCA CVA  afib   HTN   Dysphagia   Impaired mobility and ADLs       DVT PPX: SCDs       Thank you for allowing us to participate in the care of this patient.     Patient was seen for 35 minutes on unit/floor of which > 50% of time was spent on counseling and coordination of care regarding the above, including prognosis, risk reduction, benefits of treatment, and options for next stage of care.    Nancy Alicea D.O.   Physical Medicine and Rehabilitation     Please note that this dictation was created using voice recognition software. I have made every reasonable attempt to correct obvious errors, but there may be errors of grammar and  possibly content that I did not discover before finalizing the note.

## 2022-07-13 NOTE — THERAPY
"Speech Language Pathology  Daily Treatment     Patient Name: Alfred Diaz  Age:  82 y.o., Sex:  male  Medical Record #: [id number]  Today's Date: 7/13/2022     Precautions  Precautions: Fall Risk, Swallow Precautions ( See Comments)  Comments: difficulty to understand speech, sleepy, right preference.    Assessment  Max cues for brief wakefulness. Pt intermittently talking with eyes closed. Pt stating he would keep his eyes open if the lights were off. Lights were turned off with no improvement in eye opening. Pt with continued severe dysarthria at the 1-2 word level. Muffled sounding voice that is hoarse and with low intensity. Mild improvement in voice and speech with single ice chips. Pt triggering dble swallow with max cues. No further trials r/t pt current status. SLP collaborated [facility] RN and RD regarding Cortrack recommended. RN to d/w MD.       Plan  NPO/Cortrack. 3 single ice chips with staff when awake and as tolerated. FEES when wakefulness improves.   Continue current treatment plan.    Discharge Recommendations: Recommend post-acute placement for additional speech therapy services prior to discharge home       07/13/22 1206   Speech / Dysarthria   Comments severe dysarthria.   Voice   Comments hoarse, low intensity   Dysphagia    Dysphagia X   Positioning / Behavior Modification Multiple Swallows   Diet / Liquid Recommendation NPO   Nutritional Liquid Intake Rating Scale Nothing by mouth   Nutritional Food Intake Rating Scale Nothing by mouth   Nursing Communication Swallow Precaution Sign Posted at Head of Bed  (regarding 3 single ice chips when awake.)   Skilled Intervention Compensatory Strategies;Verbal Cueing   Recommended Route of Medication Administration   Medication Administration  Via Gastric Tube   Patient / Family Goals   Patient / Family Goal #1 \"water.\"   Goal #1 Outcome Goal not met   Short Term Goals   Short Term Goal # 1 Pt will maintain wakefulness for 30 min with moderate " stimulation.   Goal Outcome # 1 Goal not met   Short Term Goal # 2 Pt will consume single ice chips at 3 per hour with staff without s/s of difficulty.   Goal Outcome # 2  Goal not met

## 2022-07-14 PROBLEM — Z71.89 GOALS OF CARE, COUNSELING/DISCUSSION: Status: ACTIVE | Noted: 2022-01-01

## 2022-07-14 NOTE — PROGRESS NOTES
Neurology Progress Note  [facility]    Referring Physician: Gricelda Poe M.D.    CC: Left facial droop, slurred speech, left sided weakness    HPI: Refer to initial documented Neurology H&P, as detailed in the patient's chart.    Interval History 7/14/2022: No acute events overnight. CT head wo contrast completed this AM, en lieu of MRI brain (unable to obtain due to SCS), revealing evolution of the moderate sized RMCA territory infarct with new hemorrhagic transformation. Patient is currently laying in bed, awakens to verbal stimuli, following commands, and fully oriented. Neurological exam remains stable with persistent right MCA syndrome. Reports high lower back pain with movements. Otherwise denies headache, vision changes, aphasia, abnormal movements, LOC changes, memory loss, or confusion.    Past Medical History:   Past Medical History:   Diagnosis Date   • A-fib ([facility])    • Apnea, sleep    • Arthritis     osteo, bilateral knees, back   • Atrial fibrillation ([facility])    • Back pain    • Back pain    • Chickenpox    • Chronic anticoagulation    • Dental disorder     Full dentures   • DVT (deep venous thrombosis) ([facility])    • Eczema    • Falls    • Frequent urination    • Dutch measles    • Hearing difficulty    • Hypertension    • Mumps    • DERREK (obstructive sleep apnea)    • PAH (pulmonary artery hypertension) ([facility])    • Painful joint     Bilateral knees   • PE (pulmonary thromboembolism) ([facility])    • Pulmonary embolism ([facility])    • Rash    • Shortness of breath    • Sleep apnea    • Snoring    • Swelling of lower extremity    • Venous insufficiency         FHx:  Family History   Problem Relation Age of Onset   • Cancer Mother    • Lung Disease Father         SHx:  Social History     Socioeconomic History   • Marital status:      Spouse name: Not on file   • Number of children: Not on file   • Years of education: Not on file   • Highest education level: Not on file   Occupational History   •  Not on file   Tobacco Use   • Smoking status: Former Smoker     Packs/day: 0.00     Years: 3.00     Pack years: 0.00     Types: Cigars     Quit date: 1955     Years since quittin.7   • Smokeless tobacco: Never Used   Vaping Use   • Vaping Use: Never used   Substance and Sexual Activity   • Alcohol use: Not Currently     Alcohol/week: 4.2 oz     Types: 7 Standard drinks or equivalent per week   • Drug use: Never   • Sexual activity: Not on file   Other Topics Concern   • Not on file   Social History Narrative   • Not on file     Social Determinants of Health     Financial Resource Strain: Not on file   Food Insecurity: Not on file   Transportation Needs: Not on file   Physical Activity: Not on file   Stress: Not on file   Social Connections: Not on file   Intimate Partner Violence: Not on file   Housing Stability: Not on file        Medications:    Current Facility-Administered Medications:   •  lidocaine (LIDODERM) 5 % 1 Patch, 1 Patch, Transdermal, Q24HR, MCKENZIE KaminskiRStanNStan, 1 Patch at 22 0818  •  NS infusion, , Intravenous, Continuous, Tommy Cm M.D., Last Rate: 100 mL/hr at 22 0405, New Bag at 22 040  •  digoxin (LANOXIN) tablet 250 mcg, 250 mcg, Oral, DAILY, Jeremy M Gonda, M.D.  •  DILTIAZem (CARDIZEM) tablet 120 mg, 120 mg, Oral, DAILY, Jeremy M Gonda, M.D.  •  furosemide (LASIX) tablet 40 mg, 40 mg, Oral, DAILY, Jeremy M Gonda, M.D.  •  potassium chloride ER (KLOR-CON) tablet 20 mEq, 20 mEq, Oral, DAILY, Jeremy M Gonda, M.D.  •  senna-docusate (PERICOLACE or SENOKOT S) 8.6-50 MG per tablet 2 Tablet, 2 Tablet, Oral, BID **AND** polyethylene glycol/lytes (MIRALAX) PACKET 1 Packet, 1 Packet, Oral, QDAY PRN **AND** magnesium hydroxide (MILK OF MAGNESIA) suspension 30 mL, 30 mL, Oral, QDAY PRN **AND** bisacodyl (DULCOLAX) suppository 10 mg, 10 mg, Rectal, QDAY PRN, Candice Aguila M.D.  •  ipratropium-albuterol (DUONEB) nebulizer solution, 3 mL, Nebulization, Q4H PRN  (RT), Candice Aguila M.D.  •  atorvastatin (LIPITOR) tablet 80 mg, 80 mg, Oral, Q EVENING, Candice Aguila M.D.  •  insulin regular (HumuLIN R,NovoLIN R) injection, 1-6 Units, Subcutaneous, Q6HRS, 1 Units at 07/11/22 2575 **AND** POC blood glucose manual result, , , Q6H **AND** NOTIFY MD and PharmD, , , Once **AND** Administer 20 grams of glucose (approximately 8 ounces of fruit juice) every 15 minutes PRN FSBG less than 70 mg/dL, , , PRN **AND** dextrose 50% (D50W) injection 25 g, 25 g, Intravenous, Q15 MIN PRN, Candice Aguila M.D.  •  Metoprolol Tartrate (LOPRESSOR) injection 5 mg, 5 mg, Intravenous, Q6HRS PRN, Cnadice Aguila M.D.  •  labetalol (NORMODYNE/TRANDATE) injection 10 mg, 10 mg, Intravenous, Q10 MIN PRN, José Antonio Madsen M.D., 10 mg at 07/14/22 0401  •  hydrALAZINE (APRESOLINE) injection 10 mg, 10 mg, Intravenous, Q2HRS PRN, José Antonio Madsen M.D.    Allergies:  Allergies   Allergen Reactions   • Vicodin [Apap-Fd&C Yellow #10 Al Aragon-Hydrocodone] Rash     Rash          Review of systems: In addition to what is detailed in the interval history above, all other systems reviewed and are negative.    Physical Examination:   Vitals:    07/13/22 2000 07/13/22 2307 07/14/22 0349 07/14/22 0354   BP: (!) 147/76 (!) 150/82 (!) 160/105    Pulse:  68 70    Resp: 18 (!) 22 20    Temp:  36.8 °C (98.2 °F) 36.6 °C (97.9 °F)    TempSrc: Temporal Temporal Temporal    SpO2: 96% 95% 95% 95%   Weight:       Height:         General: Patient in no acute distress, pleasant and cooperative.  HEENT: Normocephalic, no signs of acute trauma.   Neck: Supple, no meningeal signs or carotid bruits. There is normal range of motion. No tenderness on exam.   Chest: Regular and unlabored breaths on 2L NC. No cough.   CV: RRR. Peripheral pitting edema to BLE.  Skin: No signs of acute rashes or trauma.   Musculoskeletal: Joints exhibit full range of motion, without any pain to palpation. There are no signs of joint or muscle  swelling. There is no tenderness to deep palpation of muscles.   Psychiatric: No hallucinatory behavior. Denies symptoms of depression or suicidal ideation. Mood and affect appear normal on exam.      NEUROLOGICAL EXAM:   Mental status, orientation: Drowsy, awakens to verbal stimuli, following commands, and fully oriented.   Speech and language: Speech is moderately dysarthric and fluent. The patient is able to name, repeat, and comprehend.   Memory: There is intact recollection of recent and remote events.   Cranial nerve exam: Pupils are 3-4 mm bilaterally and equally reactive to light and accommodation. Visual fields full. There is no nystagmus on primary or secondary gaze. Right gaze preference, able to overcome midline but not bury sclera to left. Face appears asymmetric with left lower droop. Sensation in the face is decreased to left face on light touch. Uvula is midline. Palate elevates symmetrically. Tongue is midline and without any signs of tongue biting or fasciculations.    Motor exam: Strength is antigravity to RUE and RLE without drift appreciated, LUE with some spontaneous finger movements and LUE/LLE withdrawal to noxious stimuli. Tone is normal. No abnormal movements were seen on exam.   Sensory exam: Decreased sensation to LUE and LLE with noxious stimuli compared to left. Left hemisensory neglect present with double stimuli.  Deep tendon reflexes:  Plantar responses are up-going to left. There is no clonus.   Coordination: No ataxia with normal finger-nose-finger to RUE, unable to complete to LUE secondary to hemiplegia.   Gait: Deferred per patient preference.      NIH Stroke Scale  7/14/22 0800    1a. Level of Consciousness (Alert, drowsy, etc): 1= Drowsy    1b. LOC Questions (Month, age): 0= Answers both correctly    1c. LOC Commands (Open/close eyes make fist/let go): 0= Obeys both correctly    2.   Best Gaze (Eyes open - patient follows examiner's finger on face): 1= Partial gaze palay    3.    Visual Fields (introduce visual stimulus/threat to patient's field quadrants): 0= No visual loss    4.   Facial Paresis (Show teeth, raise eyebrows and squeeze eyes shut): 2 = Partial     5a. Motor Arm - Left (Elevate arm to 90 degrees if patient is sitting, 45 degrees if  supine): 3= No effort against gravity    5b. Motor Arm - Right (Elevate arm to 90 degrees if patient is sitting, 45 degrees if supine): 0= No drift    6a. Motor Leg - Left (Elevate leg 30 degrees with patient supine): 3= No effort against gravity    6b. Motor Leg - Right  (Elevate leg 30 degrees with patient supine): 0= No drift    7.   Limb Ataxia (Finger-nose, heel down shin): 0= No ataxia    8.   Sensory (Pin prick to face, arm, trunk and leg - compare side to side): 1= Partial loss    9.  Best Language (Name item, describe a picture and read sentences): 0= No aphasia    10. Dysarthria (Evaluate speech clarity by patient repeating listed words): 1= Mild to moderate slurring    11. Extinction and Inattention (Use information from prior testing to identify neglect or  double simultaneous stimuli testing): 1= Partial neglect    Total NIH Score: 13      Ancillary Data Reviewed:    Labs:  Lab Results   Component Value Date/Time    PROTHROMBTM 13.4 07/11/2022 07:36 AM    INR 1.03 07/11/2022 07:36 AM      Lab Results   Component Value Date/Time    WBC 10.7 07/14/2022 01:38 AM    RBC 4.71 07/14/2022 01:38 AM    HEMOGLOBIN 14.7 07/14/2022 01:38 AM    HEMATOCRIT 45.5 07/14/2022 01:38 AM    MCV 96.6 07/14/2022 01:38 AM    MCH 31.2 07/14/2022 01:38 AM    MCHC 32.3 (L) 07/14/2022 01:38 AM    MPV 11.2 07/14/2022 01:38 AM    NEUTSPOLYS 75.20 (H) 07/11/2022 07:36 AM    LYMPHOCYTES 16.30 (L) 07/11/2022 07:36 AM    MONOCYTES 7.00 07/11/2022 07:36 AM    EOSINOPHILS 0.70 07/11/2022 07:36 AM    BASOPHILS 0.50 07/11/2022 07:36 AM      Lab Results   Component Value Date/Time    SODIUM 143 07/14/2022 01:38 AM    POTASSIUM 4.0 07/14/2022 01:38 AM    CHLORIDE 108  07/14/2022 01:38 AM    CO2 26 07/14/2022 01:38 AM    GLUCOSE 104 (H) 07/14/2022 01:38 AM    BUN 22 07/14/2022 01:38 AM    CREATININE 0.81 07/14/2022 01:38 AM      Lab Results   Component Value Date/Time    CHOLSTRLTOT 195 07/12/2022 02:38 AM     (H) 07/12/2022 02:38 AM    HDL 59 07/12/2022 02:38 AM    TRIGLYCERIDE 45 07/12/2022 02:38 AM       Lab Results   Component Value Date/Time    ALKPHOSPHAT 90 07/13/2022 12:30 PM    ASTSGOT 18 07/13/2022 12:30 PM    ALTSGPT 14 07/13/2022 12:30 PM    TBILIRUBIN 1.0 07/13/2022 12:30 PM        Imaging/Testing:    I interpreted and/or reviewed the patient's neuroimaging    CT-HEAD W/O   Final Result      1.  Evolving right MCA infarct with areas of hemorrhagic transformation, new from prior exam.   2.  Partial effacement of the right lateral ventricle. No midline shift.      Dr. Roberts discussed these findings with Dr. Navarro at 546 a.m. by telephone on 7/14/2022      CT-HEAD W/O   Final Result      Subacute right MCA territory, temporal lobe, infarction with some new cortical thickening, worsening sulcal effacement/mass effect. No macro hemorrhagic transformation is identified      DX-CHEST-PORTABLE (1 VIEW)   Final Result         Diffuse interstitial prominence could relate to mild fluid overload.      Elevation of the left hemidiaphragm with left basilar atelectasis.      IR-NEURO INTERVENTIONAL CONSULT-IP   Final Result      1.  Occluded right M1 segment.   2.  Right middle cerebral artery thrombectomy through the right carotid access-TICI 2 b flow.      CT-CTA NECK WITH & W/O-POST PROCESSING   Final Result      CT angiogram of the neck within normal limits.      CT-CEREBRAL PERFUSION ANALYSIS   Final Result      1.Cerebral blood flow less than 30% likely representing completed infarct = 148 mL   2.T Max more than 6 seconds likely representing combination of completed infarct and ischemia = 0 mL   3. Mismatched volume likely representing ischemic brain/penumbra= 148 mL    4.Please note that the cerebral perfusion was performed on the limited brain tissue around the basal ganglia region. Infarct/ischemia outside the CT perfusion sections may not be seen on this study.      CT-CTA HEAD WITH & W/O-POST PROCESS   Final Result      RIGHT M1 occlusion      Findings were communicated with and acknowledged by MICA POON via [employer] on 7/11/2022 8:07 AM.      CT-HEAD W/O   Final Result      1. Hyperdense right MCA is suspicious for right MCA occlusion. Attention on CTA.   2. Slightly decreased gray-white differentiation in the right temporal lobe, which may be due to an acute infarct.   3. Mild global parenchymal atrophy. Chronic small vessel ischemic changes.          Assessment and Plan:    Alfred Diaz is a 82 y.o. male with relevant history of Afib on Eliquis (last dose approximately 2 days ago after running out of medication), DVT, PE, DERREK, and hypertension presenting to the hospital for left facial droop, slurred speech, and left sided weakness for which neurology was consulted for acute stroke. Neurological exam initially was significant for a dense right MCA syndrome (NIHSS 20). CT head wo contrast showed no acute hemorrhage with hyperdense right MCA and subtle decreased gray-white matter differentiation. CTA head and neck w/wo contrast showed a large vessel occlusion of the right MCA, M1 segment. CTP showed a favorable perfusion defect of the right MCA territory without a core infarct. The patient was not a IV thrombolytic candidate as last known well was greater than 4.5 hours with unknown onset time of symptoms with patient awakening with right MCA syndrome. Dr. Madsen, Neuro IR, was notified patient underwent emergent thrombectomy for endovascular clot retrieval with TICI 2b reperfusion. Etiology is most likely cardioembolic given his Afib history off anticoagulation (Eliquis).      Neurological exam remains unchanged today with persistent right gaze preference,  left sided hemisensory neglect, left UMN facial droop, left hemiparesis, and left sided numbness. CT head wo contrast completed this AM, en lieu of MRI brain (unable to obtain due to SCS), revealing evolution of the moderate sized RMCA territory infarct with new hemorrhagic transformation. Will recommend waiting 7 days post ictus before resuming anticoagulation with Eliquis on 7/18/22.     Plan:     1. Acute ischemic of right MCA territory with hemorrhagic transformation secondary to right MCA emboli s/p thromectomy with TICI 2b reperfusion   -q4h and PRN neuro assessment. VS per nursing/unit protocol.   -Maintain systolic BP 110-160 given TICI 2b reperfusion. This is in an effort to minimize reperfusion injury and/or hemorrhagic conversion. Antihypertensives per primary team.   -Given moderate sided RMCA infarct with hemorrhagic transformation, wait 7 days post ictus before resuming Eliquis 5mg PO or per tube BID on 7/18/22.   -Telemetry; currently Afib. Known history of Afib/arrhythmia.   -Atorvastatin 80 mg PO or per tube q HS for LDL goal <70. Note .    -BG management per primary team. BG goal . Note hemoglobin A1c is 5.6, within goal <7%   -PT/OT/SLP eval and treat.    -SLP to re-evaluate today with FEES given increased wakefulness. Otherwise recommend Cortrak enteral tube   -PT, OT, and SLP recommending post-acute placement for continued therapies  -Physiatry consulted recommending inpatient rehab  -Palliative Care consult to be held today with family and patient at 10:00 to discuss goals of care going forward  -Follow up outpatient at [facility]. Referral placed.    -DVT PPX: SCDs.     2. Chronic high lower back pain  -Lidocaine 5% transdermal patch q24hrs ordered  -Defer remainder of pain management to primary team    No further recommendations or further studies from a neurological standpoint at this time. Please re-consult if you have further questions or there is a change in  status.    The evaluation of the patient, and recommended management, was discussed with Dr. Navarro, Dr. Poe, and bedside RN. I have performed a physical exam and reviewed and updated ROS and Plan today (7/14/2022). In review of yesterday's note (7/13/2022), there are no changes except as documented above.    Tremayne Maguire, MSN [facility]  Nurse Practitioner  [facility]  t) [phone]

## 2022-07-14 NOTE — THERAPY
Speech Language Pathology  Daily Treatment     Patient Name: Alfred Diaz  Age:  82 y.o., Sex:  male  Medical Record #: [id number]  Today's Date: 7/14/2022     Precautions: Fall Risk, Swallow Precautions     Assessment    Patient was seen for a clinical swallow reassessment this morning. Patient was awake and responsive but lethargic and kept eyes closed throughout the session. He was oriented x4. Patient only agreed to sit up to ~45 degrees in bed due to back pain (RN had placed lidocaine patch prior to tx). L facial droop, L lingual weakness and impaired L facial sensation appreciated. Oral care was completed via suction toothbrush. Patient was presented with ice chips x4, thins via 1/2-full tsp x4, and liquidized solids via 1/2-full tsp x3. Oral acceptance, containment and clearance were adequate with no anterior bolus loss, pocketing or oral residue noted. Pharyngeal swallow response became increasingly more timely with each presentation. Pt did swallow 2-3x per bolus, which can be concerning for pharyngeal residue. However, no overt s/sx of aspiration occurred w/ PO intake. Wet vocal quality x1 noted w/ intake of thins, which is concerning for possible airway invasion. An instrumental swallow study (FEES) is recommended to determine swallow physiology and inform POC prior to initiating an oral diet, given pt is showing clinical s/sx of dysphagia in the setting of a R MCA CVA w/ hemorrhagic conversion. Pt is also at risk for malnutrition/dehydration due to impaired HUAN. Pt is declining a Cortrak at this time and is unsure about participating in a FEES. He states he would like to hold off until the family meeting w/ PC at 10am before determining next steps.     Recommendations:  NPO/consider non oral nutrition/hydration/meds  Ok for 5-10 ice chips/hr 1:1 w/ nsg when awake  Consider FEES, depending on GOC    Plan    Continue current treatment plan.    Discharge Recommendations: Recommend post-acute placement  "for additional speech therapy services prior to discharge home         Objective     07/14/22 0925   Vitals   O2 (LPM) 2   O2 Delivery Device Silicone Nasal Cannula   Dysphagia    Positioning / Behavior Modification Self Monitoring;Cough / Clear after Swallow;Multiple Swallows;Modulate Rate or Bite Size   Other Treatments PO trials: ice chips, thins via tsp, liquidized   Diet / Liquid Recommendation NPO;Pre-Feeding Trials with SLP Only   Recommended Route of Medication Administration   Medication Administration  Other (See Comments)  (non-oral route)   Patient / Family Goals   Patient / Family Goal #1 \"water.\"   Goal #1 Outcome Goal not met   Short Term Goals   Short Term Goal # 1 New 7/14: Patient will consume PO trials with SLP only with no s/sx of aspiriation.   Goal Outcome # 1 Progressing as expected   Short Term Goal # 2 Pt will consume single ice chips at 3 per hour with staff without s/s of difficulty.   Goal Outcome # 2  Goal met     "

## 2022-07-14 NOTE — PROGRESS NOTES
Monitor summary: Afib 66-70, HI --, QRS 0.08, QT --, with rare PVCs and couplets per strip from monitor room.

## 2022-07-14 NOTE — CARE PLAN
The patient is Stable - Low risk of patient condition declining or worsening    Shift Goals  Clinical Goals: Stable neuro checks  Patient Goals: Sleep  Family Goals: FABIAN    Progress made toward(s) clinical / shift goals:       Problem: Neuro Status  Goal: Neuro status will remain stable or improve  Outcome: Progressing  Q4h neuro checks are being done. No acute changes in neuro status noted.      Problem: Risk for Aspiration  Goal: Patient's risk for aspiration will be absent or decrease  Outcome: Progressing  Patient's head of the bed is 30 degrees or higher.      Problem: Pain - Standard  Goal: Alleviation of pain or a reduction in pain to the patient’s comfort goal  Outcome: Progressing  Patient reports no pain at this time.     Patient is not progressing towards the following goals:      Problem: Knowledge Deficit - Stroke Education  Goal: Patient's knowledge of stroke and risk factors will improve  Outcome: Not Progressing   Patient is confused in conversation and unable to verbalize risk factors of stroke at this time.

## 2022-07-14 NOTE — THERAPY
Speech Language Pathology   Fiberoptic Endoscopic Evaluation of Swallow     Patient Name: Alfred Diaz  AGE:  82 y.o., SEX:  male  Medical Record #: [id number]  Today's Date: 7/14/2022     Precautions  Precautions: Fall Risk, Swallow Precautions ( See Comments)  Comments: difficult to understand speech, sleepy, right preference.    Assessment    HPI: 82 year old male admitted for stroke on 7/11/22. Pt has a history of A-fib chronic anticoagulation, sleep apnea, arthritis, back pain, DVT, eczema, falls, HTN, PAH, and remote smoking. Pt presented to [facility] ED via EMS from home after he was found altered with stroke like symptoms. Imaging showed a R M1 occlusion. Pt taken down for a successful thrombectomy.     Current Method of Nutrition   NPO until cleared by speech pathology      Pertinent Information:  Affect/Behavior: Calm, Cooperative, Somnolent  Oxygen Requirements: 2 L Nasal Cannula  Cortrak: None  Dentition: Edentulous  Factor(s) Affecting Performance: Impaired endurance, lethargy    Discussed the risks, benefits, and alternatives of the FEES procedure. Patient/family acknowledged and agreed to proceed.     Assessment  Flexible Endoscopic Evaluation of Swallowing (FEES) completed at bedside today. The endoscope was passed transnasally via left nare to evaluate the anatomy and physiology of swallowing. Pt tolerated the procedure with no apparent distress.    Anatomic Findings: bilateral space occupying lesions on the posterior aspect of the TVFs consistent w/ contact ulcers  Vocal Fold Motion: Bilateral movement  Secretion Management: Excess secretions (dry,yellow) in pharynx  PO trials: ice chips, thin liquids, mildly thick liquids, liquidized, puree      Consistency PAS Score Timing Comments   Ice Chips 1 N/A    Thin Liquid 5 During swallow Tsp - PAS 3  Tsp - PAS 5  Cup - PAS 5 during, additional penetration from the pyriforms through the IA space after  Cup - suspect aspiration before the swallow;  unable to determine PAS score due to impaired view of the airway   Mildly Thick Liquid 3 During swallow Tsp - PAS 1  Tsp - PAS 3  Cup - PAS 1  Cup - PAS 1  Cup - PAS 1  Cup - PAS 2 after (liquid rinse w/ pudding)   Liquidized 1 N/A Severe diffuse pharyngeal residue   Puree 3 During swallow Severe diffuse pharyngeal residue                             1     No contrast enters airway  2     Contrast enters the airway, remains above the vocal folds, and is ejected from the airway (not seen in the airway at the end of the swallow).  3     Contrast enters the airway, remains above the vocal folds, and is not ejected from the airway (is seen in the airway after the swallow).  4     Contrast enters the airway, contacts the vocal folds, and is ejected from the airway.  5     Contrast enters the airway, contacts the vocal folds, and is not ejected from the airway  6     Contrast enters the airway, crosses the plane of the vocal folds, and is ejected from the airway.  7     Contrast enters the airway, crosses the plane of the vocal folds, and is not ejected from the airway despite effort.  8     Contrast enters the airway, crosses the plane of the vocal folds, is not ejected from the airway and there is no response to aspiration.    Oral phase:  Impaired labial seal with trace bolus escape of thins on L side. Adequate oral clearance w/ no pocketing or oral residue noted. Impaired orolingual bolus control with escape of thins to the laryngeal vestibule x1.     Pharyngeal phase:  Timely pharyngeal swallow response. Diffuse weakness, including BoT, pharyngeal constriction, laryngeal elevation. Reduced laryngeal sensation w/ reflexive cough in response to gross amount of thin spillage to the laryngeal vestibule though no response to trace amount of thins on the VFs during a different cup sip. Deficits resulted in the followin. Deep penetration of thins via tsp and cup to the VFs with additional penetration after from  pyriform residue through the IA space. No cough response.   2. Suspect episode of aspiration of thins via cup before the onset of the swallow with immediate reflexive cough response. No blue seen in the airway after coughing.  3. High penetration of mildly thick liquids via tsp during the swallow and penetration of mildly thick liquid rinse to clear pudding after the swallow from the pyriforms.   4. penetation of pudding during the swallow.   5. Diffuse BoT, vallecular, PPW and pyriform sinus residue across intake, more severe with solids and thin liquids vs. Mildly thick liquids (mild-moderate). Multiple effortful swallows were not effective to clear. Liquid rinses were not effective to clear.         Clinical Impressions  The pt presents with a moderate oropharyngeal dysphagia, most notable for deep penetration asn aspiration of thin liquids and diffuse severe pharyngeal residue w/ solids which increases risk of aspiration after the swallow. Dysphagia is likely acute related to R MCA CVA, impaired HUAN s/p two procedures requiring anesthesia. Swallow safety is relatively preserved while swallow efficiency is impaired. Risk for aspiration PNA with an oral diet is high due to dystussia, impaired mobility and oral renee changes s/p CVA. Risk for malnutrition/dehydration is high due to impaired endurance/HUAN. Non-oral nutrition is indicated at this time. Swallow prognosis for return to an oral diet is good as pt has good family support, is motivated to improve and is able to follow directives for participation in exercise-based and behavioral swallow rehabilitation. Expect pt will need a repeat instrumental swallow study within a week to determine readiness for a full oral diet.    Recommendations  1. NPO/Cortrak; ok for snacks mildly thick liquids 4 oz 3x/day as tolerated 1:1 w/ nsg; ok for single ice chips as tolerated as well  2.  Swallowing Instructions & Precautions:   Supervision: 1:1 feeding with constant  "supervision  Positioning: Fully upright and midline during oral intake  Medication: Non Oral   Strategies: Small bites/sips, Slow rate of intake, Multiple swallows (x 3-4) per bite/sip   Oral Care: Q4h      Plan    Recommend Speech Therapy 5 times per week until therapy goals are met for the following treatments:  Dysphagia Training, Expression Training and Patient / Family / Caregiver Education.    Discharge Recommendations: Recommend post-acute placement for additional speech therapy services prior to discharge home       Objective       07/14/22 1515   Patient / Family Goals   Patient / Family Goal #1 \"water.\"   Goal #1 Outcome Goal not met   Short Term Goals   Short Term Goal # 1 New 7/14: Patient will consume PO trials with SLP only with no s/sx of aspiriation.   Goal Outcome # 1 Progressing as expected   Short Term Goal # 2 New 7/14: Patient will complete BoT, pharyngeal constriction, laryngeal elevation swallowing exercises x20 reps per session given min cues.     "

## 2022-07-14 NOTE — PROGRESS NOTES
4 Eyes Skin Assessment Completed by MATTHEW Siddiqui and MATTHEW Almanzar.    Head Redness  Ears Redness and Blanching  Nose WDL  Mouth WDL  Neck Redness, Scab and Incision-healing incision site  Breast/Chest Redness, Bruising and Blanching-sternal bruising & tele sticker redness  Shoulder Blades WDL  Spine WDL  (R) Arm/Elbow/Hand Redness, Blanching, Bruising and Abrasion  (L) Arm/Elbow/Hand Redness, Blanching, Bruising and Abrasion  Abdomen Bruising  Groin WDL  Scrotum/Coccyx/Buttocks- moisture fissure on coccyx  (R) Leg Redness, Blanching, Bruising and Swelling  (L) Leg Bruising  (R) Heel/Foot/Toe WDL  (L) Heel/Foot/Toe WDL          Devices In Places Tele Box, Pulse Ox, SCD's and Nasal Cannula      Interventions In Place Heel Mepilex, Sacral Mepilex, Waffle Overlay, Pillows, Elbow Mepilex, Q2 Turns and Barrier Cream    Possible Skin Injury Yes    Pictures Uploaded Into Epic Yes  Wound Consult Placed- No  RN Wound Prevention Protocol Ordered No     Orders and updated photo placed previously in chart before ICU transfer to floor.

## 2022-07-14 NOTE — ASSESSMENT & PLAN NOTE
Had family meeting with palliative care and patient's family today regarding current clinical status, repeat head CT results and speech evaluation/n.p.o.   -Family decided to transition to DNAR/DNI  - however agreed to paula  - total time = 17 minutes    Now comfort care

## 2022-07-14 NOTE — DISCHARGE PLANNING
Patient remains NPO with Cortrak, plan for FEEs when patient is more awake. TCC will continue to follow.

## 2022-07-14 NOTE — CONSULTS
Reason for PC Consult: Advance Care Planning    Consulted by:   Dr. Aguila    Assessment:  General:   82 year old male admitted for stroke on 7/11/22. Pt has a history of A-fib chronic anticoagulation, sleep apnea, arthritis, back pain, DVT, eczema, falls, HTN, PAH, and remote smoking. Pt presented to [facility] ED via EMS from home after he was found altered with stroke like symptoms. Imaging showed a R M1 occlusion. Pt taken down for a successful thrombectomy.     Prior PC Consult:   None on File    Social:   Pt lives at home with his wife. He is independent with ADLs, he goes to a swim class 5 days a week. He uses a cane to ambulate and still drives. Family reports he maintains an active lifestyle daily.    Dyspnea: No, 92% on 2L NC  Last BM:  (PTA)    Pain: No    Depression: No    Dementia: No       Spiritual:  Is Druze or spirituality important for coping with this illness? No, Family declined visit from   Has a  or spiritual provider visit been requested? No    Palliative Performance Scale: 30%    Advance Directive: None on File   DPOA: None on File  POLST: None on File    To locate the AD/POLST, please hover the cursor over the patient's code status to find all linked ACP documents.    Code Status: Full    Outcome:  PC RN met with family at bedside, they requested to meet in family room to speak privately. Pt's spouse Nona, dtr Nell, and son Jean-Claude in person, family friend/prior PCP Kavitha on via phone.    Introduced self and role of PC. Discussed their understanding of clinical picture, they verbalized good insight and understanding. The verbalize good understanding of pt's beliefs, values and preferences. Long discussion regarding tube feedings, family states they would explore short term TF but know that pt would not want long term TF. Kavitha verbalized agreement, she understands pt's preferences and he would not want long term tube feeds. Explored family's thoughts on what a short term  period is defined by, ie days, weeks vs months. They had verbalized different timelines, however they want to make sure pt has the capacity for recovery.    Dr. Poe came and provided updates on CT scan, answered questions.     Discussed difficulty in knowing a stroke victims trajectory and recovery ability. Discussed that if recovery and rehab is the goal, then a longer term nutrition source is necessary. Dr. Poe clarified that TF does not completely remove the risk of aspiration pt's own secretions, family verbalized understanding. Family verbalized that they understand that pt will never get back to what he was before. Nell reports that pt has resources to use for support at home regardless of what happens. Pt can return home with his wife in their home and they can get paid caregivers in to help.    Discussed pending FEES, family report that their goals would be dependent on those results. If pt has the ability to recovery and eat orally they would explore a Gtube while pt gets therapy. If pt has lost the ability to swallow safely, they would discuss alternatives such as comfort care or hospice.    Discussed code status, AD and POLST. Family discussed with Kavitha, they all agree that pt does not want heroics and are agreeable with DNAR/DNI. Kavitha verbalized support of this as she and pt have had discussions in the past and pt has expressed wanting to be DNR. Kavitha will look to see if she has a copy of AD, if so she will fax to PC team.     Provided business card to family, encouraged them to call with questions or concerns.     Active listening, education, validation, normalization, therapeutic touch, and emotional support provided throughout encounter.    Updated:   Dr. Poe, SLP    Plan:   Awaiting FEES results, continue GOC discussion.     Thank you for allowing Palliative Care to participate in this patient's care. Please feel free to call [phone] with any questions or concerns.

## 2022-07-14 NOTE — PROGRESS NOTES
Assumed patient care at 1900. Patient is alert and oriented x 4. Denies pain or discomfort. Bed in lowest position and locked. Call light within reach and bed alarm is set. Hourly rounding continues.

## 2022-07-14 NOTE — PROGRESS NOTES
Hospital Medicine Daily Progress Note    Date of Service  7/14/2022    Chief Complaint  Alfred Diaz is a 82 y.o. male admitted 7/11/2022 with stroke    Hospital Course  82-year-old male with a history of atrial fibrillation, PE/DVT, DERREK, and hypertension who presented with left-sided facial droop, slurred speech and left-sided weakness when he awoke in the morning.  Patient was found to have MCA M1 segment thrombosis status post thrombectomy.  Initially in the ICU.  He was then transferred to the floor.  Unable to obtain MRI brain given SCS.  Etiology likely cardioembolic given history of A. fib and off anticoagulation prior to admission.  Repeat CT head showed moderate right MCA infarct with hemorrhagic transformation.  Neurology recommended holding anticoagulation until 7/18/2022.  He was evaluated by speech therapy who recommended n.p.o., core track placed.  Palliative care was consulted.      Interval Problem Update  Palliative care consulted, family meeting 7/14, changed to DNAR/DNI however did want cortrak placed.   -Having back pain, has chronic back pain takes tramadol, lidocaine added but no improvement, started pain meds  -Repeat head CT this morning showed hemorrhagic transformation, neurology recommended holding off on anticoagulation until 7/18    Consultants/Specialty  Neuro  PM&R    Code Status  Full Code    Disposition  Likely post-acute    Review of Systems  Review of Systems   Constitutional: Negative for chills and fever.   HENT:        Dry mouth   Eyes: Negative for redness.   Respiratory: Negative for shortness of breath.    Cardiovascular: Negative for chest pain.   Gastrointestinal: Negative for nausea and vomiting.   Musculoskeletal: Positive for back pain.   Neurological: Positive for sensory change and focal weakness.   Psychiatric/Behavioral: Negative for memory loss.         Physical Exam  Temp:  [36.4 °C (97.5 °F)-36.8 °C (98.2 °F)] 36.7 °C (98.1 °F)  Pulse:  [54-71] 61  Resp:   [13-27] 18  BP: (144-160)/() 159/96  SpO2:  [92 %-97 %] 92 %    Physical Exam  Constitutional:       General: He is not in acute distress.     Appearance: He is ill-appearing. He is not toxic-appearing or diaphoretic.      Comments: Lethargic but wakes to voice and follows commands   HENT:      Head: Normocephalic and atraumatic.      Nose: Nose normal.      Mouth/Throat:      Mouth: Mucous membranes are dry.   Eyes:      General: No scleral icterus.     Conjunctiva/sclera: Conjunctivae normal.   Cardiovascular:      Rate and Rhythm: Normal rate and regular rhythm.      Heart sounds: No murmur heard.    No friction rub. No gallop.   Pulmonary:      Effort: Pulmonary effort is normal.      Breath sounds: Normal breath sounds.   Abdominal:      General: Bowel sounds are normal. There is no distension.      Palpations: Abdomen is soft.      Tenderness: There is no abdominal tenderness.   Musculoskeletal:      Right lower leg: No edema.      Left lower leg: No edema.   Skin:     Coloration: Skin is not jaundiced.      Findings: No rash.   Neurological:      Mental Status: He is oriented to person, place, and time.      Cranial Nerves: Cranial nerve deficit present.      Sensory: Sensory deficit present.      Motor: Weakness (left sided hemiparesis) present.             Current Facility-Administered Medications:   •  lidocaine (LIDODERM) 5 % 1 Patch, 1 Patch, Transdermal, Q24HR, Tremayne Maguire A.P.R.N., 1 Patch at 07/14/22 0818  •  NS infusion, , Intravenous, Continuous, Tommy Cm M.D., Last Rate: 100 mL/hr at 07/14/22 0405, New Bag at 07/14/22 0405  •  digoxin (LANOXIN) tablet 250 mcg, 250 mcg, Oral, DAILY, Jeremy M Gonda, M.D.  •  DILTIAZem (CARDIZEM) tablet 120 mg, 120 mg, Oral, DAILY, Jeremy M Gonda, M.D.  •  furosemide (LASIX) tablet 40 mg, 40 mg, Oral, DAILY, Jeremy M Gonda, M.D.  •  potassium chloride ER (KLOR-CON) tablet 20 mEq, 20 mEq, Oral, DAILY, Jeremy M Gonda, M.D.  •  senna-docusate  (PERICOLACE or SENOKOT S) 8.6-50 MG per tablet 2 Tablet, 2 Tablet, Oral, BID **AND** polyethylene glycol/lytes (MIRALAX) PACKET 1 Packet, 1 Packet, Oral, QDAY PRN **AND** magnesium hydroxide (MILK OF MAGNESIA) suspension 30 mL, 30 mL, Oral, QDAY PRN **AND** bisacodyl (DULCOLAX) suppository 10 mg, 10 mg, Rectal, QDAY PRN, Candice Aguila M.D.  •  ipratropium-albuterol (DUONEB) nebulizer solution, 3 mL, Nebulization, Q4H PRN (RT), Candice Aguila M.D.  •  atorvastatin (LIPITOR) tablet 80 mg, 80 mg, Oral, Q EVENING, Candice Aguila M.D.  •  insulin regular (HumuLIN R,NovoLIN R) injection, 1-6 Units, Subcutaneous, Q6HRS, 1 Units at 07/11/22 8125 **AND** POC blood glucose manual result, , , Q6H **AND** NOTIFY MD and PharmD, , , Once **AND** Administer 20 grams of glucose (approximately 8 ounces of fruit juice) every 15 minutes PRN FSBG less than 70 mg/dL, , , PRN **AND** dextrose 50% (D50W) injection 25 g, 25 g, Intravenous, Q15 MIN PRN, Candice Aguila M.D.  •  Metoprolol Tartrate (LOPRESSOR) injection 5 mg, 5 mg, Intravenous, Q6HRS PRN, Candice Aguila M.D.  •  labetalol (NORMODYNE/TRANDATE) injection 10 mg, 10 mg, Intravenous, Q10 MIN PRN, José Antonio Madsen M.D., 10 mg at 07/14/22 0401  •  hydrALAZINE (APRESOLINE) injection 10 mg, 10 mg, Intravenous, Q2HRS PRN, José Antonio Madsen M.D.      Fluids    Intake/Output Summary (Last 24 hours) at 7/14/2022 1040  Last data filed at 7/14/2022 0000  Gross per 24 hour   Intake 1000 ml   Output --   Net 1000 ml       Laboratory  Recent Labs     07/12/22  0238 07/13/22  0647 07/14/22  0138   WBC 11.9* 11.9* 10.7   RBC 5.31 5.16 4.71   HEMOGLOBIN 16.9 16.4 14.7   HEMATOCRIT 49.8 48.9 45.5   MCV 93.8 94.8 96.6   MCH 31.8 31.8 31.2   MCHC 33.9 33.5* 32.3*   RDW 49.1 50.9* 52.5*   PLATELETCT 191 161* 161*   MPV 10.9 11.1 11.2     Recent Labs     07/13/22  0647 07/13/22  1230 07/14/22  0138   SODIUM 142 144 143   POTASSIUM 4.2 4.1 4.0   CHLORIDE 105 108 108   CO2 25 25 26    GLUCOSE 115* 113* 104*   BUN 25* 25* 22   CREATININE 0.95 0.87 0.81   CALCIUM 8.4* 8.3* 7.8*             Recent Labs     07/12/22  0238   TRIGLYCERIDE 45   HDL 59   *       Imaging  CT-HEAD W/O   Final Result      1.  Evolving right MCA infarct with areas of hemorrhagic transformation, new from prior exam.   2.  Partial effacement of the right lateral ventricle. No midline shift.      Dr. Roberts discussed these findings with Dr. Navarro at 546 a.m. by telephone on 7/14/2022      CT-HEAD W/O   Final Result      Subacute right MCA territory, temporal lobe, infarction with some new cortical thickening, worsening sulcal effacement/mass effect. No macro hemorrhagic transformation is identified      DX-CHEST-PORTABLE (1 VIEW)   Final Result         Diffuse interstitial prominence could relate to mild fluid overload.      Elevation of the left hemidiaphragm with left basilar atelectasis.      IR-NEURO INTERVENTIONAL CONSULT-IP   Final Result      1.  Occluded right M1 segment.   2.  Right middle cerebral artery thrombectomy through the right carotid access-TICI 2 b flow.      CT-CTA NECK WITH & W/O-POST PROCESSING   Final Result      CT angiogram of the neck within normal limits.      CT-CEREBRAL PERFUSION ANALYSIS   Final Result      1.Cerebral blood flow less than 30% likely representing completed infarct = 148 mL   2.T Max more than 6 seconds likely representing combination of completed infarct and ischemia = 0 mL   3. Mismatched volume likely representing ischemic brain/penumbra= 148 mL   4.Please note that the cerebral perfusion was performed on the limited brain tissue around the basal ganglia region. Infarct/ischemia outside the CT perfusion sections may not be seen on this study.      CT-CTA HEAD WITH & W/O-POST PROCESS   Final Result      RIGHT M1 occlusion      Findings were communicated with and acknowledged by MICA POON via [employer] on 7/11/2022 8:07 AM.      CT-HEAD W/O   Final Result      1. Hyperdense  right MCA is suspicious for right MCA occlusion. Attention on CTA.   2. Slightly decreased gray-white differentiation in the right temporal lobe, which may be due to an acute infarct.   3. Mild global parenchymal atrophy. Chronic small vessel ischemic changes.           Assessment/Plan  * Acute right arterial ischemic stroke, MCA (middle cerebral artery) (HCC)  Assessment & Plan  S/p IR thrombectomy on 7/11.  Residual left sided weakness remains.  Likely thromboembolic from afib.  Unable to perform MRI  Repeat CT head showed hemorrhagic transformation, holding anticoagulation until 7/18  Maintain -160.  Lipitor.  ST recommended NPO, cortrak placed    Goals of care, counseling/discussion  Assessment & Plan  Had family meeting with palliative care and patient's family today regarding current clinical status, repeat head CT results and speech evaluation/n.p.o.   -Family decided to transition to DNAR/DNI  - however agreed to cortrak  - total time = 17 minutes    DERREK (obstructive sleep apnea)- (present on admission)  Assessment & Plan  Continue CPAP. Will order for CPAP qhs.  Likely reason patient with hypersomnolence.    Pulmonary embolus (HCC)- (present on admission)  Assessment & Plan  History of, maintained with eliquis, but patient stopped 2 days PTA.   post op vascular retrieval right carotid artery sheath from IR thrombectomy.  Neurology recommending MRI brain prior to restarting AC to ensure no bleed present.  Holding AC for now given hemorrhagic transformation    Atrial fibrillation (HCC)- (present on admission)  Assessment & Plan  Maintained on Eliquis PTA.  Holding AC until 7/18 given repeat CT head results

## 2022-07-14 NOTE — DOCUMENTATION QUERY
[facility]                                                                       Query Response Note      PATIENT:               SARAHI MEDINA  ACCT #:                  [id number]  MRN:                     [id number]  :                      1940  ADMIT DATE:       2022 7:39 AM  DISCH DATE:          RESPONDING  PROVIDER #:        [id number]           QUERY TEXT:    Atrial fibrillation is documented in the Medical Record.  Please specify the type.    NOTE:  If an appropriate response is not listed below, please respond with a new note.      The patient's Clinical Indicators include:  Patient admitted with Acute right MCA ischemic stroke likely thromboembolic from AFib  [facility] - Atrial Fibrillation  21 Office Visit note indicates a diagnosis of Chronic Persistent AFib  EKG's this admit: Atrial Fibrillation, V rate   TX: Cardizem QD, resumption of anticoagulation pending neurology clearance    Thank you,  Candice Flower RN, CCDS  Clinical   Connect via [employer]  Options provided:   -- Other persistent atrial fibrillation   -- Chronic atrial fibrillation, unspecified   -- Other, please specify __________________________      Query created by: Candice Flower on 2022 2:27 PM    RESPONSE TEXT:    Chronic atrial fibrillation, unspecified          Electronically signed by:  JOHNSON GRAMAJO MD 2022 2:30 PM

## 2022-07-14 NOTE — PROGRESS NOTES
Monitor summary: Afib, HR 62-80, ND -, QRS 0.10, QT 0.39 with (R) PVCs per strip from monitor room

## 2022-07-14 NOTE — CARE PLAN
The patient is Watcher - Medium risk of patient condition declining or worsening    Shift Goals  Clinical Goals: discuss goals of care  Patient Goals: pain control  Family Goals: FABIAN    Progress made toward(s) clinical / shift goals:  Pt code status changed to DNR/DNI. Collaborating with pt, family, and care team to ensure pt fully understands interventions and can make informed decisions regarding his care. Working to control pts pain with pharmacologic and nonpharmacologic interventions.     Patient is not progressing towards the following goals:      Problem: Dysphagia  Goal: Dysphagia will improve  Outcome: Not Progressing  Note: Collaborating with SLP to perform FEES.

## 2022-07-14 NOTE — HOSPITAL COURSE
82-year-old male with a history of atrial fibrillation, PE/DVT, DERREK, and hypertension who presented with left-sided facial droop, slurred speech and left-sided weakness when he awoke in the morning.  Patient was found to have MCA M1 segment thrombosis status post thrombectomy.  Initially in the ICU.  He was then transferred to the floor.  Unable to obtain MRI brain given SCS.  Etiology likely cardioembolic given history of A. fib and off anticoagulation prior to admission.  Repeat CT head showed moderate right MCA infarct with hemorrhagic transformation.  Neurology recommended holding anticoagulation until 7/18/2022.  He was evaluated by speech therapy who recommended n.p.o., core track placed.  Palliative care was consulted. After multiple family meetings and patient refusing most cares and cortrak, patient and family requested to transition to comfort care.  Initiated on 7/17.

## 2022-07-15 NOTE — CARE PLAN
Problem: Optimal Care of the Stroke Patient  Goal: Optimal emergency care for the stroke patient  Outcome: Progressing  Goal: Optimal acute care for the stroke patient  Outcome: Progressing     Problem: Knowledge Deficit - Stroke Education  Goal: Patient's knowledge of stroke and risk factors will improve  Outcome: Progressing     Problem: Psychosocial - Patient Condition  Goal: Patient's ability to verbalize feelings about condition will improve  Outcome: Progressing  Goal: Patient's ability to re-evaluate and adapt role responsibilities will improve  Outcome: Progressing     Problem: Discharge Planning - Stroke  Goal: Ensure Stroke Core Measures are met prior to discharge  Outcome: Progressing  Goal: Patient’s continuum of care needs will be met  Outcome: Progressing     Problem: Neuro Status  Goal: Neuro status will remain stable or improve  Outcome: Progressing     Problem: Hemodynamic Monitoring  Goal: Patient's hemodynamics, fluid balance and neurologic status will be stable or improve  Outcome: Progressing     Problem: Respiratory - Stroke Patient  Goal: Patient will achieve/maintain optimum respiratory rate/effort  Outcome: Progressing     Problem: Dysphagia  Goal: Dysphagia will improve  Outcome: Progressing     Problem: Risk for Aspiration  Goal: Patient's risk for aspiration will be absent or decrease  Outcome: Progressing     Problem: Urinary Elimination  Goal: Establish and maintain regular urinary output  Outcome: Progressing     Problem: Bowel Elimination  Goal: Establish and maintain regular bowel function  Outcome: Progressing     Problem: Mobility - Stroke  Goal: Patient's capacity to carry out activities will improve  Outcome: Progressing  Goal: Spasticity will be prevented or improved  Outcome: Progressing  Goal: Subluxation will be prevented or improved  Outcome: Progressing     Problem: Self Care  Goal: Patient will have the ability to perform ADLs independently or with assistance (bathe,  groom, dress, toilet and feed)  Outcome: Progressing     Problem: Knowledge Deficit - Standard  Goal: Patient and family/care givers will demonstrate understanding of plan of care, disease process/condition, diagnostic tests and medications  Outcome: Progressing     Problem: Pain - Standard  Goal: Alleviation of pain or a reduction in pain to the patient’s comfort goal  Outcome: Progressing     Problem: Skin Integrity  Goal: Skin integrity is maintained or improved  Outcome: Progressing     Problem: Fall Risk  Goal: Patient will remain free from falls  Outcome: Progressing   The patient is Stable - Low risk of patient condition declining or worsening    Shift Goals  Clinical Goals: Maintain Skin Integrity/Monitor neuro status  Patient Goals: Rest  Family Goals: FABIAN    Progress made toward(s) clinical / shift goals:      Patient is not progressing towards the following goals:

## 2022-07-15 NOTE — DISCHARGE PLANNING
Following for post acute services. Current documentation indicates max assist for mobility and self care. SLP trial for po intake remain NPO with tube feeding, Plan undetermined at this time. Will need to verify level of support to return to the community.

## 2022-07-15 NOTE — THERAPY
"Speech Language Pathology  Daily Treatment     Patient Name: Alfred Diaz  Age:  82 y.o., Sex:  male  Medical Record #: [id number]  Today's Date: 7/15/2022     Precautions  Precautions: Fall Risk, Swallow Precautions ( See Comments)  Comments: NG    Assessment    Patient was seen for dysphagia tx with focus on PO trials of ice chips x4, MT2 via tsp x4 before pt declined to continue. Pt was sleeping on arrival but agreed to participate by nodding his head. He was repositioned upright in bed and oral care was completed. Pt performed 2 effortful swallows w/ ice chips and 4 effortful swallows w/ MT2 for a total of 24 effortful swallows this session. Pt then shook his head no when asked if he could continue. PO discontinued at this point. Swallow exercises taught to daughter who agreed to trial them when pt is more alert. Pt mainly limited by lethargy/decreased arousal today. Will continue to follow.     Recommendations:  NPO/TF except mildly thick liquids 4 oz 3x/day as tolerated 1:1 w/ nsg; ok for single ice chips as tolerated as well when alert  Oral care Q4  Encourage completion of swallow exercises 3x/day (handout at bedside)    Plan    Continue current treatment plan.    Discharge Recommendations: Recommend post-acute placement for additional speech therapy services prior to discharge home       Objective       07/15/22 1527   Dysphagia    Positioning / Behavior Modification Modulate Rate or Bite Size;Multiple Swallows;Effortful Swallow;Self Monitoring   Oral / Pharyngeal / Laryngeal Exercises Pharyngeal Constriction Exercises   Other Treatments PO trials ice chips, MT2   Diet / Liquid Recommendation NPO;Pre-Feeding Trials with SLP Only;Mildly Thick (2) - (Nectar Thick)  (MT2 4oz/3xday 1:1)   Recommended Route of Medication Administration   Medication Administration  Via Gastric Tube   Patient / Family Goals   Patient / Family Goal #1 \"water.\"   Goal #1 Outcome Goal not met   Short Term Goals   Short Term Goal " # 1 New 7/14: Patient will consume PO trials with SLP only with no s/sx of aspiriation.   Goal Outcome # 1 Progressing slower than expected  (limited by lethargy today)   Short Term Goal # 2 New 7/14: Patient will complete BoT, pharyngeal constriction, laryngeal elevation swallowing exercises x20 reps per session given min cues.   Goal Outcome # 2  Progressing slower than expected  (limited by lethargy today)

## 2022-07-15 NOTE — PROGRESS NOTES
Monitor summary: Afib, HR 55-64, NC -, QRS 0.08, QT - with (R) PVCs per strip from monitor room

## 2022-07-15 NOTE — PROGRESS NOTES
Physical Medicine and Rehabilitation Consultation  Follow up note           Date of initial consultation: 7/12/2022  Requesting provider: Candice Aguila MD   Consulting provider: Nancy Alicea D.O.  Reason for consultation: assess for acute inpatient rehab appropriateness  LOS: 4 Day(s)    Chief complaint: Left sided weakness, slurred speech     HPI: The patient is a 82 y.o.  male with a past medical history of afib on eliquis (ran out of medication 2 days prior to admission), DVT, PE, DERREK, spinal cord stimulator for chronic back pain, and HTN ;  who presented on 7/11/2022  7:39 AM with new onset left sided weakness, facial droop, and slurred speech. At time of evaluation in the ED BP was 180/100, and NIHSS 20. CT head obtained showed no acute intracranial abnormalities. CTA head and neck showed a large vessel occlusion of the R MCA, M1 segment. CT perfusion scan showed defect of the R MCA territory. Neurology was consulted and neuro IR consulted, patient was deemed appropriate for an emergent thrombectomy. Patient was taken to IR on 7/11 for mechanical thrombectomy of the R M1 occlusion, TICI 2b. Patient was admitted to the ICU for further monitoring.  S/p  Mechanical thrombectomy, patient has been very sleepy, but able to interact with therapy and nursing.     7/12: Patient seen and examined at bedside with wife, daughter, PT and OT at bedside. Patient continues to have left sided weakness and slurred speech. Denies HA, lightheadedness, or dizziness. Is very tired, closes eyes during visit, however he does make jokes.     7/13: Patient seen and examined with wife at side. Patient very fatigued today. Patient keeps eyes closed during visit. Was more awake yesterday. Patient denies pain or discomfort, reports continued Left sided weakness. No other complaints.     7/15: Patient seen and examined at bedside with daughter. Patient lethargic, interacts with daughter, but does not like to keep eyes open for  extended period of time. Daughter tells me he was previously getting oxycodone for pain, patient has history of back pain with history of spinal cord simulator in place for pain. Stimulator is now off. Patient denies HA, lightheadedness or dizziness. Interacts with me for MMT on R side, but keeps eyes closed during MMT.     Social Hx:  Patient lives with wife in a 1 story house with one small step to enter.   1 ROSARIO  At prior level of function patient was independent with mobility and ADLs, used a SPC in the community.       Tobacco: Denies  Alcohol: Denies  Drugs: Denies     THERAPY:  Restrictions: Fall risk, NPO   PT: Functional mobility    PT Note : Max A bed mobility, Max A sit to stand, was unable to transfer   7/15 PT Note : Total A for sit to stand     OT: ADLs   OT Note : Max A upper and lower body dressing     SLP:    SLP Note : NPO, very limited ice, Poor level of attention during SLP.    SLP Note:  NPO with cortrak with upgrade for snacks with mildly thicken liquids     IMAGIN/12 CT HEAD  IMPRESSION:     Subacute right MCA territory, temporal lobe, infarction with some new cortical thickening, worsening sulcal effacement/mass effect. No macro hemorrhagic transformation is identified     CT Head   IMPRESSION:     1.  Evolving right MCA infarct with areas of hemorrhagic transformation, new from prior exam.  2.  Partial effacement of the right lateral ventricle. No midline shift.    PROCEDURES:   Mechanical thrombectomy for R M1 occlusion, TICI 2b performed by Dr. Madsen     PMH:  Past Medical History:   Diagnosis Date   • A-fib ([facility])    • Apnea, sleep    • Arthritis     osteo, bilateral knees, back   • Atrial fibrillation ([facility])    • Back pain    • Back pain    • Chickenpox    • Chronic anticoagulation    • Dental disorder     Full dentures   • DVT (deep venous thrombosis) ([facility])    • Eczema    • Falls    • Frequent urination    • Georgian measles    • Hearing difficulty    •  Hypertension    • Mumps    • DERREK (obstructive sleep apnea)    • PAH (pulmonary artery hypertension) ([facility])    • Painful joint     Bilateral knees   • PE (pulmonary thromboembolism) ([facility])    • Pulmonary embolism ([facility])    • Rash    • Shortness of breath    • Sleep apnea    • Snoring    • Swelling of lower extremity    • Venous insufficiency        PSH:  Past Surgical History:   Procedure Laterality Date   • WA THROMBOENDARTECTMY NECK,NECK INCIS Right 7/11/2022    Procedure: RIGHT NECK EXPLORATION; REMOVAL OF RIGHT CAROTID SHEATH;  Surgeon: Sebastian Robertson M.D.;  Location: SURGERY [facility];  Service: Vascular   • WA LAP,INGUINAL HERNIA REPR,INITIAL Right 3/23/2022    Procedure: REPAIR, HERNIA, INGUINAL, ROBOT-ASSISTED, USING [employer] - LARGE;  Surgeon: Itzel Bennett M.D.;  Location: SURGERY [facility];  Service: Gen Robotic   • ARTHROPLASTY Bilateral     knees   • ARTHROSCOPY, KNEE     • CATARACT EXTRACTION WITH IOL Bilateral    • INSERTION PERMANENT SPINAL CORD STIMULATOR     • LAMINOTOMY      lumbar   • TONSILLECTOMY     • TURP-VAPOR     • UROLOGY SURGERY         FHX:  Family History   Problem Relation Age of Onset   • Cancer Mother    • Lung Disease Father        Medications:  Current Facility-Administered Medications   Medication Dose   • lidocaine (LIDODERM) 5 % 1 Patch  1 Patch   • acetaminophen (TYLENOL) suppository 650 mg  650 mg   • Pharmacy Consult Request ...Pain Management Review 1 Each  1 Each   • Pharmacy Consult: Enteral tube insertion - review meds/change route/product selection  1 Each   • acetaminophen (Tylenol) tablet 650 mg  650 mg    Followed by   • [START ON 7/19/2022] acetaminophen (Tylenol) tablet 650 mg  650 mg   • oxyCODONE immediate-release (ROXICODONE) tablet 2.5 mg  2.5 mg    Or   • oxyCODONE immediate-release (ROXICODONE) tablet 5 mg  5 mg    Or   • HYDROmorphone (Dilaudid) injection 0.25 mg  0.25 mg   • atorvastatin (LIPITOR) tablet 80 mg  80 mg   • digoxin (LANOXIN) tablet 250  "mcg  250 mcg   • DILTIAZem (CARDIZEM) tablet 120 mg  120 mg   • furosemide (LASIX) tablet 40 mg  40 mg   • senna-docusate (PERICOLACE or SENOKOT S) 8.6-50 MG per tablet 2 Tablet  2 Tablet    And   • polyethylene glycol/lytes (MIRALAX) PACKET 1 Packet  1 Packet    And   • magnesium hydroxide (MILK OF MAGNESIA) suspension 30 mL  30 mL    And   • bisacodyl (DULCOLAX) suppository 10 mg  10 mg   • potassium chloride SA (Kdur) tablet 20 mEq  20 mEq   • ipratropium-albuterol (DUONEB) nebulizer solution  3 mL   • insulin regular (HumuLIN R,NovoLIN R) injection  1-6 Units    And   • dextrose 50% (D50W) injection 25 g  25 g   • Metoprolol Tartrate (LOPRESSOR) injection 5 mg  5 mg   • hydrALAZINE (APRESOLINE) injection 10 mg  10 mg       Allergies:  Allergies   Allergen Reactions   • Vicodin [Apap-Fd&C Yellow #10 Al [place]agon-Hydrocodone] Rash     Rash       Physical Exam:  Vitals: BP (!) 158/102   Pulse (!) 121   Temp 36.5 °C (97.7 °F) (Temporal)   Resp 18   Ht 1.93 m (6' 3.98\")   Wt 110 kg (242 lb 8.1 oz)   SpO2 93%   Gen: NAD, laying comfortably in bed, daughter in room   Head: NC/AT   Eyes/ Nose/ Mouth: PERRLA, moist mucous membranes, keeps eyes closed, fatigued  Cardio: RRR, good distal perfusion, warm extremities  Pulm: normal respiratory effort, no cyanosis   Abd: Soft NTND, negative borborygmi   Ext: No peripheral edema. No calf tenderness. No clubbing.    Mental status:  A&Ox4 (person, place, date, situation) answers questions appropriately follows commands on R side promptly   Speech: fluent, no aphasia or + dysarthria     CRANIAL NERVES:  2,3: visual acuity grossly intact, PERRL  3,4,6: EOMI bilaterally, no nystagmus or diplopia  5: sensation intact to light touch bilaterally and symmetric  7: + left sided facial droop   8: hearing grossly intact  9,10: symmetric palate elevation      Motor: poor participation with left side, very fatigued       Upper Extremity  Myotome R L   Shoulder flexion C5 5/5 0/5   Elbow " flexion C5 5/5 0/5   Wrist extension C6 5/5 0/5   Elbow extension C7 5/5 0/5   Finger flexion C8 5/5 0/5   Finger abduction T1 5/5 0/5     Lower Extremity Myotome R L   Hip flexion L2 5/5 0/5   Knee extension L3 5/5 0/5   Ankle dorsiflexion L4 5/5 0/5   Toe extension L5 5/5 0/5   Ankle plantarflexion S1 5/5 0/5       Sensory:   intact to light touch through out RUE, decreased sensation to LLE     Negative Shaikh b/l     Tone: no spasticity noted, no cogwheeling noted        Labs: Reviewed and significant for   Recent Labs     07/13/22  0647 07/14/22  0138 07/15/22  0248   RBC 5.16 4.71 4.54*   HEMOGLOBIN 16.4 14.7 14.6   HEMATOCRIT 48.9 45.5 44.3   PLATELETCT 161* 161* 147*     Recent Labs     07/13/22  1230 07/14/22  0138 07/15/22  0248   SODIUM 144 143 142   POTASSIUM 4.1 4.0 4.1   CHLORIDE 108 108 109   CO2 25 26 25   GLUCOSE 113* 104* 100*   BUN 25* 22 19   CREATININE 0.87 0.81 0.71   CALCIUM 8.3* 7.8* 7.9*     Recent Results (from the past 24 hour(s))   POCT glucose device results    Collection Time: 07/14/22 12:08 PM   Result Value Ref Range    POC Glucose, Blood 105 (H) 65 - 99 mg/dL   POCT glucose device results    Collection Time: 07/14/22  5:40 PM   Result Value Ref Range    POC Glucose, Blood 111 (H) 65 - 99 mg/dL   POCT glucose device results    Collection Time: 07/14/22  8:14 PM   Result Value Ref Range    POC Glucose, Blood 107 (H) 65 - 99 mg/dL   POCT glucose device results    Collection Time: 07/14/22 11:55 PM   Result Value Ref Range    POC Glucose, Blood 106 (H) 65 - 99 mg/dL   Prealbumin    Collection Time: 07/15/22  2:48 AM   Result Value Ref Range    Pre-Albumin 12.4 (L) 18.0 - 38.0 mg/dL   CBC without Differential    Collection Time: 07/15/22  2:48 AM   Result Value Ref Range    WBC 9.0 4.8 - 10.8 K/uL    RBC 4.54 (L) 4.70 - 6.10 M/uL    Hemoglobin 14.6 14.0 - 18.0 g/dL    Hematocrit 44.3 42.0 - 52.0 %    MCV 97.6 81.4 - 97.8 fL    MCH 32.2 27.0 - 33.0 pg    MCHC 33.0 (L) 33.7 - 35.3 g/dL     RDW 51.9 (H) 35.9 - 50.0 fL    Platelet Count 147 (L) 164 - 446 K/uL    MPV 11.8 9.0 - 12.9 fL   Basic Metabolic Panel (BMP)    Collection Time: 07/15/22  2:48 AM   Result Value Ref Range    Sodium 142 135 - 145 mmol/L    Potassium 4.1 3.6 - 5.5 mmol/L    Chloride 109 96 - 112 mmol/L    Co2 25 20 - 33 mmol/L    Glucose 100 (H) 65 - 99 mg/dL    Bun 19 8 - 22 mg/dL    Creatinine 0.71 0.50 - 1.40 mg/dL    Calcium 7.9 (L) 8.5 - 10.5 mg/dL    Anion Gap 8.0 7.0 - 16.0   Magnesium    Collection Time: 07/15/22  2:48 AM   Result Value Ref Range    Magnesium 2.1 1.5 - 2.5 mg/dL   PHOSPHORUS    Collection Time: 07/15/22  2:48 AM   Result Value Ref Range    Phosphorus 3.0 2.5 - 4.5 mg/dL   CRP QUANTITIVE (NON-CARDIAC)    Collection Time: 07/15/22  2:48 AM   Result Value Ref Range    Stat C-Reactive Protein 2.47 (H) 0.00 - 0.75 mg/dL   ESTIMATED GFR    Collection Time: 07/15/22  2:48 AM   Result Value Ref Range    GFR (CKD-EPI) 91 >60 mL/min/1.73 m 2   POCT glucose device results    Collection Time: 07/15/22  5:51 AM   Result Value Ref Range    POC Glucose, Blood 104 (H) 65 - 99 mg/dL         ASSESSMENT:  Patient is a 82 y.o. male admitted with left sided weakness due to R M1 occlusion     [facility] Code / Diagnosis to Support: 0001.1 - Stroke: Left Body Involvement (Right Brain)    Rehabilitation: Impaired ADLs and mobility  Patient is a good candidate for inpatient rehab based on needs for PT, OT, and speech therapy, see dispo details below.     Barriers to transfer include: Insurance authorization, TCCs to verify disposition, medical clearance and bed availability     Additional Recommendations:   R MCA CVA  - greatest deficits are L sided weakness, dysarthria and dysphagia  - s/p mechanical thrombectomy on 7/11 with TICI 2b   - neuro consulted, repeat CT head 7/12  with evidence of R MCA infarct, no evidence of hemorrhage   - 7/14 Repeat CT Head with evidence of evolving R MCA infarct and new evidence of hemorrhagic transformation    - etiology of CVA likely cardiemoblic, has missed 2 days of eliquis   - currently on statin, will need confirmation of start date for anticoag after mechanical thrombectomy   - continue with PT/OT/SLP    Decreased alertness  - recommend avoiding sedating medications, utilize tylenol primarily for pain  - correct sleep wake cycles, encourage lights on and blinds open during the day, and minimize sounds at night   - can trial neurostimulant, but do not recommend starting now with patient's HTN and evidence of hemorrhagic transformation     Dysphagia  - patient currently NPO, poor alertness during SLP eval on 7/12  - 7/14 SLP eval completed, continues to be NPO with TF with upgrade to snacks with mildly thickened liquids     Afib  - on eliquis at home, currently held   - on digoxin and diltiazem, monitor for dig level      HTN  - currently in IV antihypertensive, likely due to NPO status  - will need BP well controlled on oral meds prior to acceptance to IRF     Dispo  -patient is currently functioning below his level of baseline, will need post acute rehab  - patient now demononstrating poor tolerance for IRF level therapy, currently recommend SNF level rehab with transition to IRF when able to tolerate   - PM&R to follow peripherally       Medical Complexity:  R MCA CVA with hemorrhagic transformation   afib   HTN   Dysphagia   Impaired mobility and ADLs       DVT PPX: SCDs       Thank you for allowing us to participate in the care of this patient.     Patient was seen for 35 minutes on unit/floor of which > 50% of time was spent on counseling and coordination of care regarding the above, including prognosis, risk reduction, benefits of treatment, and options for next stage of care.    Nancy Alicea D.O.   Physical Medicine and Rehabilitation     Please note that this dictation was created using voice recognition software. I have made every reasonable attempt to correct obvious errors, but there may be errors of  grammar and possibly content that I did not discover before finalizing the note.

## 2022-07-15 NOTE — PROGRESS NOTES
Cortrak Placement    Tube Team verified patient name and medical record number prior to tube placement.  Cortrak tube (43 inches, 10 Slovenian) placed at 59 cm in left nare.  Per Cortrak picture, tube appears to be in the stomach.  Nursing Instructions: Awaiting KUB to confirm placement before use for medications or feeding. Once placement confirmed, flush tube with 30 ml of water, and then remove and save stylet, in patient medication drawer.

## 2022-07-15 NOTE — DIETARY
"Nutrition Support Assessment:  Day 4 of admit.  Alfred Diaz is a 82 y.o. male with admitting DX of Stroke determined by clinical assessment.     Current problem list:  1. GOC  2. Acute right arterial ischemic stroke, MCA  3. Atrial fibrillation  4. PE  5. DERREK     Assessment:  Estimated Nutritional Needs based on:   Height: 193 cm (6' 3.98\")  Weight: 110 kg (242 lb 8.1 oz) - bed scale  Weight to Use in Calculations: 105 kg (231 lb 7.7 oz) - appears to   Body mass index is 28.19 kg/m²., BMI classification: overweight  Pt is -0.6L fluids per I/O. Pt observed on floor, appears overweight.    Calculation/Equation: MSJ x 1.1 = 2032 kcals  Total Calories / day: 2000 - 2300  (Calories / k - 21)  Total Grams Protein / day: 105 - 126  (Grams Protein / k - 1.2)     Evaluation:   1. Consult received for TF.  2. Cortrak placed and verified on KUB \"tip likely at the level of the gastric cardia\".  3. Palliative care saw for family conference on , family wanting to wait for SLP eval and then proceed. MD notes DNR/I and wanting cortrak placed after failed FEES.  4. Pt failed multiple SLP evals, went for FEES yesterday and SLP recommends NPO with sips of MTL 4 oz TID.  5. Pt is on 2 L oxygen via NC.  6. Per weight hx, pt typically weighs in 220s, weight in 2022 232-235 lbs, increased to current weight os 242 lbs.   7. Skin: incision R neck, sacral moisture fissure,   8. Labs: glucose 100, Corrected Ca 8.1, A1C on  is 5.6 (wnl)  9. Meds: lipitor, lasix, gabapentin, SSI (not needed), Kdur, colace, bowel protocol, NS infusion  10. Last BM: PTA (4 days+ ago, scheduled colace on MAR)  11. Standard formula is appropriate to meet estimated needs.     Malnutrition Risk: Unable to fully assess.     Recommendations/Plan:  1. Start TF Fibersource HN at 25 ml/hr and advance per protocol to goal rate of 75 ml/hr to provide 2160 kcals, 97 gm protein, and 1458 ml free water per day.  2. Fluids per MD.  3. Diet " upgrades per SLP.    RD following.

## 2022-07-15 NOTE — THERAPY
Physical Therapy   Daily Treatment     Patient Name: Alfred Diaz  Age:  82 y.o., Sex:  male  Medical Record #: [id number]  Today's Date: 7/15/2022     Precautions  Precautions: Fall Risk;Swallow Precautions ( See Comments)    Assessment    Pt most limited by lethargy; keeps eyes closed throughout session; needing full faciltiation of upright balance; pushing with right UE and slumping to left; following all commands for exercises in LEs; no palpable muscle contraction noted in left Ue/LE; will follow, needs placement as remains in goals of care.     Plan    Continue current treatment plan.    DC Equipment Recommendations: Unable to determine at this time  Discharge Recommendations: Recommend post-acute placement for additional physical therapy services prior to discharge home      ABRIDGED Subjective/Objective       07/15/22 1105   Cognition    Cognition / Consciousness X   Speech/ Communication Hard of Hearing;Delayed Responses;Dysarthric;Slurred   Level of Consciousness Responds to voice   Safety Awareness Impaired   Attention Impaired   Sequencing Impaired   Initiation Impaired   Comments cooperative with encouragement; prefers eyes closed, right eye opening more than left; minimal verbal output unless max encouragement; dtr and wife at bedside for encouragement as well;   Passive ROM Lower Body   Passive ROM Lower Body WDL   Strength Lower Body   Lower Body Strength  X   Comments right DF: 3+/5, knee ext 3-/5, knee flexion 3-/5; left no active movement   Sensation Lower Body   Lower Extremity Sensation   X   Comments able to localize light touch to left LE;   Sitting Lower Body Exercises   Sitting Lower Body Exercises Yes   Long Arc Quad 1 set of 10;Right   Comments facilitated LAQ with quad/tendon tapping for facilitation of left, no strength noted   Balance   Sitting Balance (Static) Poor   Sitting Balance (Dynamic) Poor -   Standing Balance (Static) Dependent   Weight Shift Sitting Poor   Skilled  Intervention Postural Facilitation;Sequencing;Tactile Cuing;Verbal Cuing   Comments pushing left, slumping left; trunk posterior lean, can find anterior balance but significant forward head; can come down to right elbow sidebending with max A, needing mod to regain midline; no assist in standing attempts;   Gait Analysis   Gait Level Of Assist Unable to Participate   Bed Mobility    Supine to Sit Maximal Assist  (assisting with right UE)   Sit to Supine Maximal Assist  (assisting with right UE)   Functional Mobility   Sit to Stand Total Assist  (acchieving 1/2 inch)   Bed, Chair, Wheelchair Transfer Unable to Participate   Short Term Goals    Short Term Goal # 1 Pt will perform supine <> sit with min A within 6 visits in order to progress bed mobility   Goal Outcome # 1 goal not met   Short Term Goal # 2 Pt will perform STS with min A within 6 visits in order to progress OOB mobility   Goal Outcome # 2 Goal not met   Short Term Goal # 3 Pt will hold midline balance without UE support within 6 visits in prep for standing pivot.   Goal Outcome # 3 Goal not met   Short Term Goal # 4 Pt will perform squat pivot transfer with mod A to the right within 6 visits to increase OOB timing.   Goal Outcome # 4 Goal not met   Short Term Goal # 5 Pt will self propel w/c x 150ft with min A within 6 visits to progress to independence.   Goal Outcome # 5 Goal not met

## 2022-07-15 NOTE — PROGRESS NOTES
Hospital Medicine Daily Progress Note    Date of Service  7/15/2022    Chief Complaint  Alfred Diaz is a 82 y.o. male admitted 7/11/2022 with stroke    Hospital Course  82-year-old male with a history of atrial fibrillation, PE/DVT, DERREK, and hypertension who presented with left-sided facial droop, slurred speech and left-sided weakness when he awoke in the morning.  Patient was found to have MCA M1 segment thrombosis status post thrombectomy.  Initially in the ICU.  He was then transferred to the floor.  Unable to obtain MRI brain given SCS.  Etiology likely cardioembolic given history of A. fib and off anticoagulation prior to admission.  Repeat CT head showed moderate right MCA infarct with hemorrhagic transformation.  Neurology recommended holding anticoagulation until 7/18/2022.  He was evaluated by speech therapy who recommended n.p.o., core track placed.  Palliative care was consulted.      Interval Problem Update  - sleeping a lot, having a lot of pain  - cortrak in place    Consultants/Specialty  Neuro  PM&R    Code Status  DNAR/DNI    Disposition  Likely post-acute    Review of Systems  Review of Systems   Constitutional: Negative for chills and fever.   HENT: Negative for nosebleeds.         Dry mouth   Eyes: Negative for redness.   Respiratory: Negative for shortness of breath.    Cardiovascular: Negative for chest pain.   Gastrointestinal: Negative for nausea and vomiting.   Genitourinary: Negative for hematuria.   Musculoskeletal: Positive for back pain and myalgias.   Skin: Negative for rash.   Neurological: Positive for sensory change and focal weakness.   Psychiatric/Behavioral: Negative for memory loss.         Physical Exam  Temp:  [36.5 °C (97.7 °F)-37.1 °C (98.8 °F)] 36.5 °C (97.7 °F)  Pulse:  [] 55  Resp:  [18-20] 18  BP: (125-161)/() 147/80  SpO2:  [90 %-96 %] 96 %    Physical Exam  Constitutional:       General: He is not in acute distress.     Appearance: He is  ill-appearing. He is not toxic-appearing or diaphoretic.      Comments: Lethargic but wakes to voice and follows commands   HENT:      Head: Normocephalic and atraumatic.      Nose: Nose normal.      Mouth/Throat:      Mouth: Mucous membranes are dry.   Eyes:      General: No scleral icterus.     Conjunctiva/sclera: Conjunctivae normal.   Cardiovascular:      Rate and Rhythm: Normal rate and regular rhythm.      Heart sounds: No murmur heard.    No friction rub. No gallop.   Pulmonary:      Effort: Pulmonary effort is normal.      Breath sounds: Normal breath sounds.   Abdominal:      General: Bowel sounds are normal. There is no distension.      Palpations: Abdomen is soft.      Tenderness: There is no abdominal tenderness.   Musculoskeletal:      Right lower leg: No edema.      Left lower leg: No edema.   Skin:     Coloration: Skin is not jaundiced.      Findings: No rash.   Neurological:      Mental Status: He is oriented to person, place, and time.      Cranial Nerves: Cranial nerve deficit present.      Sensory: Sensory deficit present.      Motor: Weakness (left sided hemiparesis) present.             Current Facility-Administered Medications:   •  lidocaine (LIDODERM) 5 % 1 Patch, 1 Patch, Transdermal, Q24HR, LOLI Kaminski, 1 Patch at 07/14/22 0818  •  acetaminophen (TYLENOL) suppository 650 mg, 650 mg, Rectal, Q6HRS PRN, Gricelda Poe M.D., 650 mg at 07/14/22 1302  •  Pharmacy Consult Request ...Pain Management Review 1 Each, 1 Each, Other, PHARMACY TO DOSE, Gricelda Poe M.D.  •  Pharmacy Consult: Enteral tube insertion - review meds/change route/product selection, 1 Each, Other, PHARMACY TO DOSE, Gricelda Poe M.D.  •  acetaminophen (Tylenol) tablet 650 mg, 650 mg, Enteral Tube, Q6HRS, 650 mg at 07/15/22 0511 **FOLLOWED BY** [START ON 7/19/2022] acetaminophen (Tylenol) tablet 650 mg, 650 mg, Enteral Tube, Q6HRS PRN, Gricelda Poe M.D.  •  oxyCODONE immediate-release  (ROXICODONE) tablet 2.5 mg, 2.5 mg, Enteral Tube, Q3HRS PRN **OR** oxyCODONE immediate-release (ROXICODONE) tablet 5 mg, 5 mg, Enteral Tube, Q3HRS PRN, 5 mg at 07/14/22 6043 **OR** HYDROmorphone (Dilaudid) injection 0.25 mg, 0.25 mg, Intravenous, Q3HRS PRN, Gricelda Poe M.D., 0.25 mg at 07/14/22 1658  •  atorvastatin (LIPITOR) tablet 80 mg, 80 mg, Enteral Tube, Q EVENING, Gricelda Poe M.D., 80 mg at 07/14/22 2015  •  digoxin (LANOXIN) tablet 250 mcg, 250 mcg, Enteral Tube, DAILY, Gricelda Poe M.D., 250 mcg at 07/15/22 0511  •  DILTIAZem (CARDIZEM) tablet 120 mg, 120 mg, Enteral Tube, DAILY, Gricelda Poe M.D., 120 mg at 07/15/22 0510  •  furosemide (LASIX) tablet 40 mg, 40 mg, Enteral Tube, DAILY, Gricelda Poe M.D., 40 mg at 07/15/22 0511  •  senna-docusate (PERICOLACE or SENOKOT S) 8.6-50 MG per tablet 2 Tablet, 2 Tablet, Enteral Tube, BID, 2 Tablet at 07/15/22 0511 **AND** polyethylene glycol/lytes (MIRALAX) PACKET 1 Packet, 1 Packet, Enteral Tube, QDAY PRN **AND** magnesium hydroxide (MILK OF MAGNESIA) suspension 30 mL, 30 mL, Enteral Tube, QDAY PRN **AND** bisacodyl (DULCOLAX) suppository 10 mg, 10 mg, Rectal, QDAY PRN, Gricelda Poe M.D.  •  potassium chloride SA (Kdur) tablet 20 mEq, 20 mEq, Enteral Tube, DAILY, Gricelda Poe M.D., 20 mEq at 07/15/22 0511  •  ipratropium-albuterol (DUONEB) nebulizer solution, 3 mL, Nebulization, Q4H PRN (RT), Candice Aguila M.D.  •  insulin regular (HumuLIN R,NovoLIN R) injection, 1-6 Units, Subcutaneous, Q6HRS, 1 Units at 07/11/22 3175 **AND** POC blood glucose manual result, , , Q6H **AND** NOTIFY MD and PharmD, , , Once **AND** Administer 20 grams of glucose (approximately 8 ounces of fruit juice) every 15 minutes PRN FSBG less than 70 mg/dL, , , PRN **AND** dextrose 50% (D50W) injection 25 g, 25 g, Intravenous, Q15 MIN PRN, Candice Aguila M.D.  •  Metoprolol Tartrate (LOPRESSOR) injection 5 mg, 5 mg, Intravenous, Q6HRS PRN,  Candice Aguila M.D.  •  hydrALAZINE (APRESOLINE) injection 10 mg, 10 mg, Intravenous, Q2HRS PRN, José Antonio Madsen M.D.      Fluids    Intake/Output Summary (Last 24 hours) at 7/15/2022 0951  Last data filed at 7/15/2022 0600  Gross per 24 hour   Intake 390 ml   Output 1450 ml   Net -1060 ml       Laboratory  Recent Labs     07/13/22  0647 07/14/22  0138 07/15/22  0248   WBC 11.9* 10.7 9.0   RBC 5.16 4.71 4.54*   HEMOGLOBIN 16.4 14.7 14.6   HEMATOCRIT 48.9 45.5 44.3   MCV 94.8 96.6 97.6   MCH 31.8 31.2 32.2   MCHC 33.5* 32.3* 33.0*   RDW 50.9* 52.5* 51.9*   PLATELETCT 161* 161* 147*   MPV 11.1 11.2 11.8     Recent Labs     07/13/22  1230 07/14/22  0138 07/15/22  0248   SODIUM 144 143 142   POTASSIUM 4.1 4.0 4.1   CHLORIDE 108 108 109   CO2 25 26 25   GLUCOSE 113* 104* 100*   BUN 25* 22 19   CREATININE 0.87 0.81 0.71   CALCIUM 8.3* 7.8* 7.9*                   Imaging  DX-ABDOMEN FOR TUBE PLACEMENT   Final Result      Feeding tube appears to extend below the diaphragm with the tip likely at the level of the gastric cardia. Volume loss in the left lung base likely causes deviation of the esophagus to the left side.      CT-HEAD W/O   Final Result      1.  Evolving right MCA infarct with areas of hemorrhagic transformation, new from prior exam.   2.  Partial effacement of the right lateral ventricle. No midline shift.      Dr. Roberts discussed these findings with Dr. Navarro at 546 a.m. by telephone on 7/14/2022      CT-HEAD W/O   Final Result      Subacute right MCA territory, temporal lobe, infarction with some new cortical thickening, worsening sulcal effacement/mass effect. No macro hemorrhagic transformation is identified      DX-CHEST-PORTABLE (1 VIEW)   Final Result         Diffuse interstitial prominence could relate to mild fluid overload.      Elevation of the left hemidiaphragm with left basilar atelectasis.      IR-NEURO INTERVENTIONAL CONSULT-IP   Final Result      1.  Occluded right M1 segment.   2.  Right  middle cerebral artery thrombectomy through the right carotid access-TICI 2 b flow.      CT-CTA NECK WITH & W/O-POST PROCESSING   Final Result      CT angiogram of the neck within normal limits.      CT-CEREBRAL PERFUSION ANALYSIS   Final Result      1.Cerebral blood flow less than 30% likely representing completed infarct = 148 mL   2.T Max more than 6 seconds likely representing combination of completed infarct and ischemia = 0 mL   3. Mismatched volume likely representing ischemic brain/penumbra= 148 mL   4.Please note that the cerebral perfusion was performed on the limited brain tissue around the basal ganglia region. Infarct/ischemia outside the CT perfusion sections may not be seen on this study.      CT-CTA HEAD WITH & W/O-POST PROCESS   Final Result      RIGHT M1 occlusion      Findings were communicated with and acknowledged by MICA POON via [employer] on 7/11/2022 8:07 AM.      CT-HEAD W/O   Final Result      1. Hyperdense right MCA is suspicious for right MCA occlusion. Attention on CTA.   2. Slightly decreased gray-white differentiation in the right temporal lobe, which may be due to an acute infarct.   3. Mild global parenchymal atrophy. Chronic small vessel ischemic changes.           Assessment/Plan  * Acute right arterial ischemic stroke, MCA (middle cerebral artery) (HCC)  Assessment & Plan  S/p IR thrombectomy on 7/11.  Residual left sided weakness remains.  Likely thromboembolic from afib.  Unable to perform MRI  Repeat CT head showed hemorrhagic transformation, holding anticoagulation until 7/18  Maintain -160.  Lipitor.  ST recommended NPO, cortrak placed    Goals of care, counseling/discussion  Assessment & Plan  Had family meeting with palliative care and patient's family today regarding current clinical status, repeat head CT results and speech evaluation/n.p.o.   -Family decided to transition to DNAR/DNI  - however agreed to cortrak  - total time = 17 minutes    DERREK (obstructive  sleep apnea)- (present on admission)  Assessment & Plan  Continue CPAP. Will order for CPAP qhs.  Likely reason patient with hypersomnolence.    Pulmonary embolus (HCC)- (present on admission)  Assessment & Plan  History of, maintained with eliquis, but patient stopped 2 days PTA.   post op vascular retrieval right carotid artery sheath from IR thrombectomy.  Neurology recommending MRI brain prior to restarting AC to ensure no bleed present.  Holding AC for now given hemorrhagic transformation    Atrial fibrillation (HCC)- (present on admission)  Assessment & Plan  Maintained on Eliquis PTA.  Holding AC until 7/18 given repeat CT head results

## 2022-07-15 NOTE — CARE PLAN
The patient is Stable - Low risk of patient condition declining or worsening    Shift Goals  Clinical Goals: Maintain Skin Integrity/Monitor neuro status  Patient Goals: Rest  Family Goals: FABIAN    Progress made toward(s) clinical / shift goals: Assumed care of patient at 1915. Patient is A&Ox4, but speech is very slurred. L facial droop apparent. LUE and LLE weakness. Q4hr neuro checks, telemetry monitoring and  in place. NIHSS = 17. Oral care is being completed 3x this shift. Bed is low and locked, bed alarm is on, call light is within reach, hourly rounding continues. Fall/Aspiration precautions are in place.     Problem: Neuro Status  Goal: Neuro status will remain stable or improve  Outcome: Progressing     Problem: Skin Integrity  Goal: Skin integrity is maintained or improved  Outcome: Progressing  Note: Waffle overlay, Q2hr turns and barrier paste is being applied.      Patient is not progressing towards the following goals:    Problem: Respiratory - Stroke Patient  Goal: Patient will achieve/maintain optimum respiratory rate/effort  Outcome: Not Progressing  Note: Patient requires 2L of O2 via NC.      Problem: Dysphagia  Goal: Dysphagia will improve  Outcome: Not Progressing  Note: Cortrak in place.      Problem: Urinary Elimination  Goal: Establish and maintain regular urinary output  Outcome: Not Progressing  Note: Patient is incontinent of bladder. Condom cath in place.      Problem: Mobility - Stroke  Goal: Patient's capacity to carry out activities will improve  Outcome: Not Progressing  Note: LUE and LLE strengths are 1/5, unable to ambulate.

## 2022-07-16 PROBLEM — G89.29 CHRONIC PAIN: Status: ACTIVE | Noted: 2022-01-01

## 2022-07-16 NOTE — PROGRESS NOTES
Monitor summary: Afib, HR 55-98, FL -, QRS 0.08, QT - with a (R) PVCs per strip from monitor room

## 2022-07-16 NOTE — CARE PLAN
The patient is Stable - Low risk of patient condition declining or worsening    Shift Goals  Clinical Goals: Monitor neuro status  Patient Goals: Rest  Family Goals: FABIAN    Progress made toward(s) clinical / shift goals:      Problem: Neuro Status  Goal: Neuro status will remain stable or improve  Outcome: Progressing   Patient is Q4 neuro checks, no acute changes in neuro status noted.     Problem: Risk for Aspiration  Goal: Patient's risk for aspiration will be absent or decrease  Outcome: Progressing  Patient is on an approved diet by SLP.      Problem: Bowel Elimination  Goal: Establish and maintain regular bowel function  Outcome: Progressing  Patient had a bowel movement today, and is receiving bowel protocol to maintain regular Bms.      Problem: Skin Integrity  Goal: Skin integrity is maintained or improved  Outcome: Progressing  Patient is Q2 turns, and mepilex is being used to prevent skin breakdown,.      Problem: Fall Risk  Goal: Patient will remain free from falls  Outcome: Progressing   Patient's bed is locked and in the lowest position. Call light within reach.     Patient is not progressing towards the following goals:

## 2022-07-16 NOTE — PROGRESS NOTES
Assumed patient care at 1900. Patient responds to voice and is oriented to person and place. Disoriented to situation and time.. Denies pain or discomfort. Bed in lowest position and locked. Call light within reach and bed alarm is set. Hourly rounding continues.        Daily Note     Today's date: 2022  Patient name: Lucinda Recinos  :   MRN: [id number]  Referring provider: Kenny Magana MD  Dx:   Encounter Diagnosis     ICD-10-CM    1  Physical deconditioning  R53 81    2  Muscular deconditioning  R29 898    3  Balance problems  R26 89                   Subjective: Patient stated moderate pain in his right foot prior to treatment session  Objective: See treatment diary below      Assessment: Patient demonstrated moderate challenge with sit to stand activity; positive response to manual intervention  Plan: Continue per plan of care  Goals: Patient's goal is To improve strength, walk, move and do things- ride motorcycle, see son referee- goals remain the same the patient is unable to get on his motorcycle       Precautions: prostate CA, R endarterectomy femoral, pacemaker, falls risk- poor sensation in R foot feels like there is a bar in his arch  Dx: balance deficits, LBP- postural deficits hip abd weakness        Daily Treatment Diary       Manuals 06/15  05/25 6/3 6/8 6/10 6/17 6/22   LAD nv  WA R 8' 8' 5'  KK   B HS stretching nv     8'     Therapeutic exercise           bridges   30x 30x x20 x30 x30 x30   Seated lumbar flex           LP DL #90 pin 4 95# 20   20x 90# 20x #90 20x 90# 20x #95 20x #95 100# 3x10   Lumbar ext    2x10 2x10 2x10 2x10    S/l clamshells     20x 20x 20x  20x   HS strap st 4x30"    4x "20 4x :20 4x :20 4x :20   Prone press up 2x10  2x10 standing  2x10  2x10 2x10 2x10 2x10   Gastroc slant bd st 3x20:  3x20: 3x :20    3x :20   ltr     20x 20x 20x 20x   Bike 6 min  8'  6' 6' 6 min  6 min  6 min   Neuro Re-ed           Hip abd isometric seated 20x:05  10x :05 10x :05 20x :05 20x :05                                                 Ther Activity           Sit to stand 2x10   2x10 2x10 2x10 2x10 2x10 2x10   sidesteps 5 laps   5 laps   5 laps 5 laps 5 laps  5 laps   Standing hip flexion to table height- getting in motorcycle     2x10 Stairs cane and rail           Objective measures from progress note       5'    Gait Training           Walking outside           6mwt 1 min 30  1 min 28 sec 1 min 38 sec 1 min 42 sec nv     Modalities

## 2022-07-16 NOTE — DISCHARGE PLANNING
Received Choice form at [id number]  Agency/Facility Name: [facility]   Referral sent per Choice form @ [phone]

## 2022-07-16 NOTE — PROGRESS NOTES
"Hospital Medicine Daily Progress Note    Date of Service  7/16/2022    Chief Complaint  Alfred Diaz is a 82 y.o. male admitted 7/11/2022 with stroke    Hospital Course  82-year-old male with a history of atrial fibrillation, PE/DVT, DERREK, and hypertension who presented with left-sided facial droop, slurred speech and left-sided weakness when he awoke in the morning.  Patient was found to have MCA M1 segment thrombosis status post thrombectomy.  Initially in the ICU.  He was then transferred to the floor.  Unable to obtain MRI brain given SCS.  Etiology likely cardioembolic given history of A. fib and off anticoagulation prior to admission.  Repeat CT head showed moderate right MCA infarct with hemorrhagic transformation.  Neurology recommended holding anticoagulation until 7/18/2022.  He was evaluated by speech therapy who recommended n.p.o., core track placed.  Palliative care was consulted.      Interval Problem Update  - patient pulled cortrak out, doesn't want it back in  - having pain, increasing pain meds  - patient reports he wants to \"go home\"  - hospice referral placed    Consultants/Specialty  Neuro  PM&R  hospice    Code Status  DNAR/DNI    Disposition  Likely hospice    Review of Systems  Review of Systems   Constitutional: Negative for chills and fever.   HENT: Negative for nosebleeds.         Dry mouth   Eyes: Negative for redness.   Respiratory: Negative for shortness of breath.    Cardiovascular: Negative for chest pain.   Gastrointestinal: Negative for nausea and vomiting.   Genitourinary: Negative for hematuria.   Musculoskeletal: Positive for back pain and myalgias.   Skin: Negative for rash.   Neurological: Positive for sensory change and focal weakness.   Psychiatric/Behavioral: Negative for memory loss.         Physical Exam  Temp:  [36.4 °C (97.5 °F)-36.7 °C (98.1 °F)] 36.5 °C (97.7 °F)  Pulse:  [59-79] 64  Resp:  [17-20] 18  BP: (122-157)/(74-99) 122/74  SpO2:  [91 %-97 %] 93 " %    Physical Exam  Constitutional:       General: He is not in acute distress.     Appearance: He is ill-appearing. He is not toxic-appearing or diaphoretic.      Comments: Lethargic but wakes to voice and follows commands   HENT:      Head: Normocephalic and atraumatic.      Nose: Nose normal.      Mouth/Throat:      Mouth: Mucous membranes are dry.   Eyes:      General: No scleral icterus.     Conjunctiva/sclera: Conjunctivae normal.   Cardiovascular:      Rate and Rhythm: Normal rate and regular rhythm.      Heart sounds: No murmur heard.    No friction rub. No gallop.   Pulmonary:      Effort: Pulmonary effort is normal.      Breath sounds: Normal breath sounds.   Abdominal:      General: Bowel sounds are normal. There is no distension.      Palpations: Abdomen is soft.      Tenderness: There is no abdominal tenderness.   Musculoskeletal:      Right lower leg: No edema.      Left lower leg: No edema.   Skin:     Coloration: Skin is not jaundiced.      Findings: No rash.   Neurological:      Mental Status: He is oriented to person, place, and time.      Cranial Nerves: Cranial nerve deficit present.      Sensory: Sensory deficit present.      Motor: Weakness (left sided hemiparesis) present.             Current Facility-Administered Medications:   •  calcium carbonate (Tums) chewable tab 1,000 mg, 1,000 mg, Oral, BID, Gricelda Poe M.D.  •  haloperidol lactate (HALDOL) injection 1 mg, 1 mg, Intravenous, Once PRN, Gricelda Poe M.D.  •  oxyCODONE immediate-release (ROXICODONE) tablet 5 mg, 5 mg, Enteral Tube, Q4HRS PRN **OR** oxyCODONE immediate release (ROXICODONE) tablet 10 mg, 10 mg, Enteral Tube, Q4HRS PRN **OR** HYDROmorphone (Dilaudid) injection 0.5 mg, 0.5 mg, Intravenous, Q4HRS PRN, Gricelda Poe M.D., 0.5 mg at 07/16/22 1313  •  cyclobenzaprine (Flexeril) tablet 5 mg, 5 mg, Enteral Tube, TID PRN, Gricelda Poe M.D.  •  gabapentin (NEURONTIN) capsule 100 mg, 100 mg, Oral, TID, Gricelda GARCIA  AURORA Poe, 100 mg at 07/16/22 0411  •  lidocaine (LIDODERM) 5 % 1 Patch, 1 Patch, Transdermal, Q24HR, LOLI Kaminski, 1 Patch at 07/16/22 1019  •  acetaminophen (TYLENOL) suppository 650 mg, 650 mg, Rectal, Q6HRS PRN, Gricelda Poe M.D., 650 mg at 07/14/22 1302  •  Pharmacy Consult Request ...Pain Management Review 1 Each, 1 Each, Other, PHARMACY TO DOSE, Gricelda Poe M.D.  •  Pharmacy Consult: Enteral tube insertion - review meds/change route/product selection, 1 Each, Other, PHARMACY TO DOSE, Gricelda Poe M.D.  •  acetaminophen (Tylenol) tablet 650 mg, 650 mg, Enteral Tube, Q6HRS, 650 mg at 07/16/22 0410 **FOLLOWED BY** [START ON 7/19/2022] acetaminophen (Tylenol) tablet 650 mg, 650 mg, Enteral Tube, Q6HRS PRN, Gricelda Poe M.D.  •  atorvastatin (LIPITOR) tablet 80 mg, 80 mg, Enteral Tube, Q EVENING, Gricelda Poe M.D., 80 mg at 07/15/22 1817  •  digoxin (LANOXIN) tablet 250 mcg, 250 mcg, Enteral Tube, DAILY, Gricelda Poe M.D., 250 mcg at 07/16/22 0411  •  DILTIAZem (CARDIZEM) tablet 120 mg, 120 mg, Enteral Tube, DAILY, Gricelda Poe M.D., 120 mg at 07/16/22 0411  •  furosemide (LASIX) tablet 40 mg, 40 mg, Enteral Tube, DAILY, Gricelda Poe M.D., 40 mg at 07/16/22 0411  •  senna-docusate (PERICOLACE or SENOKOT S) 8.6-50 MG per tablet 2 Tablet, 2 Tablet, Enteral Tube, BID, 2 Tablet at 07/16/22 0411 **AND** polyethylene glycol/lytes (MIRALAX) PACKET 1 Packet, 1 Packet, Enteral Tube, QDAY PRN **AND** magnesium hydroxide (MILK OF MAGNESIA) suspension 30 mL, 30 mL, Enteral Tube, QDAY PRN **AND** bisacodyl (DULCOLAX) suppository 10 mg, 10 mg, Rectal, QDAY PRN, Gricelda Poe M.D.  •  potassium chloride SA (Kdur) tablet 20 mEq, 20 mEq, Enteral Tube, DAILY, Gricelda Poe M.D., 20 mEq at 07/16/22 0410  •  ipratropium-albuterol (DUONEB) nebulizer solution, 3 mL, Nebulization, Q4H PRN (RT), Candice Aguila M.D.  •  insulin regular (HumuLIN R,NovoLIN R)  injection, 1-6 Units, Subcutaneous, Q6HRS, 1 Units at 07/11/22 2355 **AND** POC blood glucose manual result, , , Q6H **AND** NOTIFY MD and PharmD, , , Once **AND** Administer 20 grams of glucose (approximately 8 ounces of fruit juice) every 15 minutes PRN FSBG less than 70 mg/dL, , , PRN **AND** dextrose 50% (D50W) injection 25 g, 25 g, Intravenous, Q15 MIN PRN, Candice Agulia M.D.  •  Metoprolol Tartrate (LOPRESSOR) injection 5 mg, 5 mg, Intravenous, Q6HRS PRN, Candice Aguila M.D.  •  hydrALAZINE (APRESOLINE) injection 10 mg, 10 mg, Intravenous, Q2HRS PRN, José Antonio Madsen M.D.      Fluids    Intake/Output Summary (Last 24 hours) at 7/16/2022 1411  Last data filed at 7/16/2022 0800  Gross per 24 hour   Intake --   Output 3300 ml   Net -3300 ml       Laboratory  Recent Labs     07/14/22  0138 07/15/22  0248   WBC 10.7 9.0   RBC 4.71 4.54*   HEMOGLOBIN 14.7 14.6   HEMATOCRIT 45.5 44.3   MCV 96.6 97.6   MCH 31.2 32.2   MCHC 32.3* 33.0*   RDW 52.5* 51.9*   PLATELETCT 161* 147*   MPV 11.2 11.8     Recent Labs     07/14/22  0138 07/15/22  0248 07/16/22  0546   SODIUM 143 142 137   POTASSIUM 4.0 4.1 3.9   CHLORIDE 108 109 105   CO2 26 25 24   GLUCOSE 104* 100* 109*   BUN 22 19 18   CREATININE 0.81 0.71 0.50   CALCIUM 7.8* 7.9* 7.8*                   Imaging  DX-ABDOMEN FOR TUBE PLACEMENT   Final Result         Feeding tube with tip projecting over the expected area of the stomach fundus.      [id number] VIEW   Final Result         Feeding tube with tip projecting over the expected area of the GE junction.      DX-ABDOMEN FOR TUBE PLACEMENT   Final Result      Feeding tube appears to extend below the diaphragm with the tip likely at the level of the gastric cardia. Volume loss in the left lung base likely causes deviation of the esophagus to the left side.      CT-HEAD W/O   Final Result      1.  Evolving right MCA infarct with areas of hemorrhagic transformation, new from prior exam.   2.  Partial effacement of  the right lateral ventricle. No midline shift.      Dr. Roberts discussed these findings with Dr. Navarro at 546 a.m. by telephone on 7/14/2022      CT-HEAD W/O   Final Result      Subacute right MCA territory, temporal lobe, infarction with some new cortical thickening, worsening sulcal effacement/mass effect. No macro hemorrhagic transformation is identified      DX-CHEST-PORTABLE (1 VIEW)   Final Result         Diffuse interstitial prominence could relate to mild fluid overload.      Elevation of the left hemidiaphragm with left basilar atelectasis.      IR-NEURO INTERVENTIONAL CONSULT-IP   Final Result      1.  Occluded right M1 segment.   2.  Right middle cerebral artery thrombectomy through the right carotid access-TICI 2 b flow.      CT-CTA NECK WITH & W/O-POST PROCESSING   Final Result      CT angiogram of the neck within normal limits.      CT-CEREBRAL PERFUSION ANALYSIS   Final Result      1.Cerebral blood flow less than 30% likely representing completed infarct = 148 mL   2.T Max more than 6 seconds likely representing combination of completed infarct and ischemia = 0 mL   3. Mismatched volume likely representing ischemic brain/penumbra= 148 mL   4.Please note that the cerebral perfusion was performed on the limited brain tissue around the basal ganglia region. Infarct/ischemia outside the CT perfusion sections may not be seen on this study.      CT-CTA HEAD WITH & W/O-POST PROCESS   Final Result      RIGHT M1 occlusion      Findings were communicated with and acknowledged by MICA POON via Vo[employer] Me on 7/11/2022 8:07 AM.      CT-HEAD W/O   Final Result      1. Hyperdense right MCA is suspicious for right MCA occlusion. Attention on CTA.   2. Slightly decreased gray-white differentiation in the right temporal lobe, which may be due to an acute infarct.   3. Mild global parenchymal atrophy. Chronic small vessel ischemic changes.           Assessment/Plan  * Acute right arterial ischemic stroke, MCA (middle  cerebral artery) (HCC)  Assessment & Plan  S/p IR thrombectomy on 7/11.  Residual left sided weakness remains.  Likely thromboembolic from afib.  Unable to perform MRI  Repeat CT head showed hemorrhagic transformation, holding anticoagulation until 7/18  Maintain -160.  Lipitor.  ST recommended NPO, cortrak placed, fell out, patient refusing to have it put back in    Chronic pain  Assessment & Plan  Patient takes tramadol at home  Increasing pain regimen today  May be moving towards hospice    Goals of care, counseling/discussion  Assessment & Plan  Had family meeting with palliative care and patient's family today regarding current clinical status, repeat head CT results and speech evaluation/n.p.o.   -Family decided to transition to DNAR/DNI  - however agreed to cortrak  - total time = 17 minutes    DERREK (obstructive sleep apnea)- (present on admission)  Assessment & Plan  Continue CPAP. Will order for CPAP qhs.  Likely reason patient with hypersomnolence.    Pulmonary embolus (HCC)- (present on admission)  Assessment & Plan  History of, maintained with eliquis, but patient stopped 2 days PTA.   post op vascular retrieval right carotid artery sheath from IR thrombectomy.  Neurology recommending MRI brain prior to restarting AC to ensure no bleed present.  Holding AC for now given hemorrhagic transformation    Atrial fibrillation (HCC)- (present on admission)  Assessment & Plan  Maintained on Eliquis PTA.  Holding AC until 7/18 given repeat CT head results

## 2022-07-16 NOTE — PROGRESS NOTES
Monitor Summary: Afib 49-57, SC .-, QRS .08, QT .- with rare PVC, and 4 beats of vtach per strip from monitor room.

## 2022-07-16 NOTE — PROGRESS NOTES
Paged Bettie Neumann regarding patient's clogged Cortrak, and she said to remove the Cortrak and place a new one. A new Cortrak was placed. However, the imaging showed the Cortrak's placement was not correct. The Cortrak was replaced and imaging confirmed the placement was correct. Around 0550, the Cortrak was found out of the patient's nose. Will notify day shift nurse to speak with MD about the pulled Cortrak.

## 2022-07-16 NOTE — CARE PLAN
The patient is Stable - Low risk of patient condition declining or worsening    Shift Goals  Clinical Goals: Insert new coretrak  Patient Goals: sleep  Family Goals: comfort    Progress made toward(s) clinical / shift goals:  Pt refusing reinsertion of cortrak. Family at bedside. Fall precautions in place, pt intermittently participates in care      Patient is not progressing towards the following goals:      Problem: Mobility - Stroke  Goal: Patient's capacity to carry out activities will improve  Outcome: Not Progressing  Goal: Spasticity will be prevented or improved  Outcome: Not Progressing  Goal: Subluxation will be prevented or improved  Outcome: Not Progressing

## 2022-07-17 NOTE — PROGRESS NOTES
"Hospital Medicine Daily Progress Note    Date of Service  7/17/2022    Chief Complaint  Alfred Diaz is a 82 y.o. male admitted 7/11/2022 with stroke    Hospital Course  82-year-old male with a history of atrial fibrillation, PE/DVT, DERREK, and hypertension who presented with left-sided facial droop, slurred speech and left-sided weakness when he awoke in the morning.  Patient was found to have MCA M1 segment thrombosis status post thrombectomy.  Initially in the ICU.  He was then transferred to the floor.  Unable to obtain MRI brain given SCS.  Etiology likely cardioembolic given history of A. fib and off anticoagulation prior to admission.  Repeat CT head showed moderate right MCA infarct with hemorrhagic transformation.  Neurology recommended holding anticoagulation until 7/18/2022.  He was evaluated by speech therapy who recommended n.p.o., core track placed.  Palliative care was consulted. After multiple family meetings and patient refusing most cares and cortrak, patient and family requested to transition to comfort care.  Initiated on 7/17.        Interval Problem Update  - refusing all cares, wants to go home, be \"done\"  - had family meeting with hospice today, family has decided to transition to comfort care, total time = 17 minutes      Consultants/Specialty  Neuro  PM&R  hospice    Code Status  Comfort Care/DNR    Disposition  Likely hospice    Review of Systems  Review of Systems   Unable to perform ROS: Mental acuity   Constitutional: Negative for chills and fever.   HENT: Negative for nosebleeds.         Dry mouth   Eyes: Negative for redness.   Respiratory: Negative for shortness of breath.    Cardiovascular: Negative for chest pain.   Gastrointestinal: Negative for nausea and vomiting.   Genitourinary: Negative for hematuria.   Musculoskeletal: Positive for back pain and myalgias.   Skin: Negative for rash.   Neurological: Positive for sensory change and focal weakness. "   Psychiatric/Behavioral: Negative for memory loss.         Physical Exam  Temp:  [36.6 °C (97.9 °F)-37.2 °C (99 °F)] 37.1 °C (98.8 °F)  Pulse:  [74-91] 91  Resp:  [16-18] 16  BP: (141-162)/() 160/97  SpO2:  [90 %-97 %] 94 %    Physical Exam  Constitutional:       General: He is not in acute distress.     Appearance: He is ill-appearing. He is not toxic-appearing or diaphoretic.      Comments: Sleeping   HENT:      Head: Normocephalic and atraumatic.      Nose: Nose normal.      Mouth/Throat:      Mouth: Mucous membranes are dry.   Cardiovascular:      Rate and Rhythm: Normal rate and regular rhythm.      Heart sounds: No murmur heard.    No friction rub. No gallop.   Pulmonary:      Effort: Pulmonary effort is normal.      Breath sounds: Normal breath sounds.   Abdominal:      General: Bowel sounds are normal. There is no distension.      Palpations: Abdomen is soft.      Tenderness: There is no abdominal tenderness.   Musculoskeletal:      Right lower leg: No edema.      Left lower leg: No edema.   Skin:     Coloration: Skin is not jaundiced.      Findings: No rash.   Neurological:      Comments: sleeping             Current Facility-Administered Medications:   •  oxyCODONE immediate-release (ROXICODONE) tablet 5 mg, 5 mg, Enteral Tube, Q4HRS PRN **OR** oxyCODONE immediate release (ROXICODONE) tablet 10 mg, 10 mg, Enteral Tube, Q4HRS PRN **OR** HYDROmorphone (Dilaudid) injection 0.5 mg, 0.5 mg, Intravenous, Q3HRS PRN, Gricelda Poe M.D.  •  MD ALERT...adult comfort care, , Other, PRN, Gricelda Poe M.D.  •  atropine 1 % ophthalmic solution 2 Drop, 2 Drop, Sublingual, Q4HRS PRN, Gricelda Poe M.D.  •  LORazepam (ATIVAN) 2 MG/ML oral conc 1 mg, 1 mg, Sublingual, Q HOUR PRN **OR** LORazepam (ATIVAN) injection 1 mg, 1 mg, Intravenous, Q HOUR PRN, Gricelda Poe M.D.  •  haloperidol lactate (HALDOL) injection 1 mg, 1 mg, Intravenous, Once PRN, Gricelda Poe M.D.  •  cyclobenzaprine  (Flexeril) tablet 5 mg, 5 mg, Enteral Tube, TID PRN, Gricelda Poe M.D.  •  gabapentin (NEURONTIN) capsule 100 mg, 100 mg, Oral, TID, Gricelda Poe M.D., 100 mg at 07/16/22 0411  •  lidocaine (LIDODERM) 5 % 1 Patch, 1 Patch, Transdermal, Q24HR, LOLI Kaminski, 1 Patch at 07/17/22 0825  •  acetaminophen (TYLENOL) suppository 650 mg, 650 mg, Rectal, Q6HRS PRN, Gricelda Poe M.D., 650 mg at 07/14/22 1302  •  Pharmacy Consult Request ...Pain Management Review 1 Each, 1 Each, Other, PHARMACY TO DOSE, Gricelda Poe M.D.  •  Pharmacy Consult: Enteral tube insertion - review meds/change route/product selection, 1 Each, Other, PHARMACY TO DOSE, Gricelda Poe M.D.  •  acetaminophen (Tylenol) tablet 650 mg, 650 mg, Enteral Tube, Q6HRS, 650 mg at 07/16/22 2259 **FOLLOWED BY** [START ON 7/19/2022] acetaminophen (Tylenol) tablet 650 mg, 650 mg, Enteral Tube, Q6HRS PRN, Gricelda Poe M.D.  •  ipratropium-albuterol (DUONEB) nebulizer solution, 3 mL, Nebulization, Q4H PRN (RT), Candice Aguila M.D.  •  Metoprolol Tartrate (LOPRESSOR) injection 5 mg, 5 mg, Intravenous, Q6HRS PRN, Candice Aguila M.D.  •  hydrALAZINE (APRESOLINE) injection 10 mg, 10 mg, Intravenous, Q2HRS PRN, José Antonio Madsen M.D., 10 mg at 07/16/22 2019      Fluids    Intake/Output Summary (Last 24 hours) at 7/17/2022 1033  Last data filed at 7/17/2022 0453  Gross per 24 hour   Intake 120 ml   Output 1800 ml   Net -1680 ml       Laboratory  Recent Labs     07/15/22  0248   WBC 9.0   RBC 4.54*   HEMOGLOBIN 14.6   HEMATOCRIT 44.3   MCV 97.6   MCH 32.2   MCHC 33.0*   RDW 51.9*   PLATELETCT 147*   MPV 11.8     Recent Labs     07/15/22  0248 07/16/22  0546   SODIUM 142 137   POTASSIUM 4.1 3.9   CHLORIDE 109 105   CO2 25 24   GLUCOSE 100* 109*   BUN 19 18   CREATININE 0.71 0.50   CALCIUM 7.9* 7.8*                   Imaging  DX-ABDOMEN FOR TUBE PLACEMENT   Final Result         Feeding tube with tip projecting over the  expected area of the stomach fundus.      [id number] VIEW   Final Result         Feeding tube with tip projecting over the expected area of the GE junction.      DX-ABDOMEN FOR TUBE PLACEMENT   Final Result      Feeding tube appears to extend below the diaphragm with the tip likely at the level of the gastric cardia. Volume loss in the left lung base likely causes deviation of the esophagus to the left side.      CT-HEAD W/O   Final Result      1.  Evolving right MCA infarct with areas of hemorrhagic transformation, new from prior exam.   2.  Partial effacement of the right lateral ventricle. No midline shift.      Dr. Roberts discussed these findings with Dr. Navarro at 546 a.m. by telephone on 7/14/2022      CT-HEAD W/O   Final Result      Subacute right MCA territory, temporal lobe, infarction with some new cortical thickening, worsening sulcal effacement/mass effect. No macro hemorrhagic transformation is identified      DX-CHEST-PORTABLE (1 VIEW)   Final Result         Diffuse interstitial prominence could relate to mild fluid overload.      Elevation of the left hemidiaphragm with left basilar atelectasis.      IR-NEURO INTERVENTIONAL CONSULT-IP   Final Result      1.  Occluded right M1 segment.   2.  Right middle cerebral artery thrombectomy through the right carotid access-TICI 2 b flow.      CT-CTA NECK WITH & W/O-POST PROCESSING   Final Result      CT angiogram of the neck within normal limits.      CT-CEREBRAL PERFUSION ANALYSIS   Final Result      1.Cerebral blood flow less than 30% likely representing completed infarct = 148 mL   2.T Max more than 6 seconds likely representing combination of completed infarct and ischemia = 0 mL   3. Mismatched volume likely representing ischemic brain/penumbra= 148 mL   4.Please note that the cerebral perfusion was performed on the limited brain tissue around the basal ganglia region. Infarct/ischemia outside the CT perfusion sections may not be seen on this study.       CT-CTA HEAD WITH & W/O-POST PROCESS   Final Result      RIGHT M1 occlusion      Findings were communicated with and acknowledged by MICA POON via Vo[employer] Me on 7/11/2022 8:07 AM.      CT-HEAD W/O   Final Result      1. Hyperdense right MCA is suspicious for right MCA occlusion. Attention on CTA.   2. Slightly decreased gray-white differentiation in the right temporal lobe, which may be due to an acute infarct.   3. Mild global parenchymal atrophy. Chronic small vessel ischemic changes.           Assessment/Plan  * Acute right arterial ischemic stroke, MCA (middle cerebral artery) (HCC)  Assessment & Plan  S/p IR thrombectomy on 7/11.  Residual left sided weakness remains.  Likely thromboembolic from afib.  Unable to perform MRI  Repeat CT head showed hemorrhagic transformation, holding anticoagulation until 7/18  Maintain -160.  Lipitor.  ST recommended NPO, cortrak placed, fell out, patient refusing to have it put back in    7/17: comfort care initiated after family meeting    Chronic pain  Assessment & Plan  Patient takes tramadol at home  Increasing pain regimen today  May be moving towards hospice    Goals of care, counseling/discussion  Assessment & Plan  Had family meeting with palliative care and patient's family today regarding current clinical status, repeat head CT results and speech evaluation/n.p.o.   -Family decided to transition to DNAR/DNI  - however agreed to cortrak  - total time = 17 minutes    Now comfort care    DERREK (obstructive sleep apnea)- (present on admission)  Assessment & Plan  Continue CPAP. Will order for CPAP qhs.  Likely reason patient with hypersomnolence.    Pulmonary embolus (HCC)- (present on admission)  Assessment & Plan  History of, maintained with eliquis, but patient stopped 2 days PTA.   post op vascular retrieval right carotid artery sheath from IR thrombectomy.  Neurology recommending MRI brain prior to restarting AC to ensure no bleed present.  Holding AC for now  given hemorrhagic transformation    Atrial fibrillation (HCC)- (present on admission)  Assessment & Plan  Maintained on Eliquis PTA.  Holding AC until 7/18 given repeat CT head results    Now on comfort care

## 2022-07-17 NOTE — HOSPICE
"Met with wife Nona, daughter, daughter Magdy and son, Primary RN and Dr. Poe for discussed comforted focused care. Patient expressed to family this morning he no longer wants curative treatment. He has refused all medication over night. Stating he wants to be \"done'    Family unable to care for patient at home. Will need placement if patient does not pass at the hospital. They are hoping he does not have to be moved from the hospital.     Discussed hospice. Family versed in hospice. Wife is a RN.     Will continue to follow daily.    Patient approved for community hospice by Bre[facility] CONDE  "

## 2022-07-17 NOTE — PROGRESS NOTES
Physical Medicine and Rehabilitation Consultation  Follow up note           Date of initial consultation: 7/12/2022  Requesting provider: Candice Aguila MD   Consulting provider: Nancy Alicea D.O.  Reason for consultation: assess for acute inpatient rehab appropriateness  LOS: 5 Day(s)    Chief complaint: Left sided weakness, slurred speech     HPI: The patient is a 82 y.o.  male with a past medical history of afib on eliquis (ran out of medication 2 days prior to admission), DVT, PE, DERREK, spinal cord stimulator for chronic back pain, and HTN ;  who presented on 7/11/2022  7:39 AM with new onset left sided weakness, facial droop, and slurred speech. At time of evaluation in the ED BP was 180/100, and NIHSS 20. CT head obtained showed no acute intracranial abnormalities. CTA head and neck showed a large vessel occlusion of the R MCA, M1 segment. CT perfusion scan showed defect of the R MCA territory. Neurology was consulted and neuro IR consulted, patient was deemed appropriate for an emergent thrombectomy. Patient was taken to IR on 7/11 for mechanical thrombectomy of the R M1 occlusion, TICI 2b. Patient was admitted to the ICU for further monitoring.  S/p  Mechanical thrombectomy, patient has been very sleepy, but able to interact with therapy and nursing.     7/12: Patient seen and examined at bedside with wife, daughter, PT and OT at bedside. Patient continues to have left sided weakness and slurred speech. Denies HA, lightheadedness, or dizziness. Is very tired, closes eyes during visit, however he does make jokes.     7/13: Patient seen and examined with wife at side. Patient very fatigued today. Patient keeps eyes closed during visit. Was more awake yesterday. Patient denies pain or discomfort, reports continued Left sided weakness. No other complaints.     7/15: Patient seen and examined at bedside with daughter. Patient lethargic, interacts with daughter, but does not like to keep eyes open for  extended period of time. Daughter tells me he was previously getting oxycodone for pain, patient has history of back pain with history of spinal cord simulator in place for pain. Stimulator is now off. Patient denies HA, lightheadedness or dizziness. Interacts with me for MMT on R side, but keeps eyes closed during MMT.     : Cortrak pulled out, patient does not want it put back in, hospice consulted. Reviewed plan with daughter for no TF and planning to move forward with hospice. Patient resting comfortably after receiving 0.5 dilaudid, pain well controlled,patient comfortable with oxymask instead of NC.     Social Hx:  Patient lives with wife in a 1 story house with one small step to enter.   1 ROSARIO  At prior level of function patient was independent with mobility and ADLs, used a SPC in the community.       Tobacco: Denies  Alcohol: Denies  Drugs: Denies     THERAPY:  Restrictions: Fall risk, NPO   PT: Functional mobility    PT Note : Max A bed mobility, Max A sit to stand, was unable to transfer   7/15 PT Note : Total A for sit to stand     OT: ADLs   OT Note : Max A upper and lower body dressing     SLP:    SLP Note : NPO, very limited ice, Poor level of attention during SLP.    SLP Note:  NPO with cortrak with upgrade for snacks with mildly thicken liquids     IMAGIN/12 CT HEAD  IMPRESSION:     Subacute right MCA territory, temporal lobe, infarction with some new cortical thickening, worsening sulcal effacement/mass effect. No macro hemorrhagic transformation is identified     CT Head   IMPRESSION:     1.  Evolving right MCA infarct with areas of hemorrhagic transformation, new from prior exam.  2.  Partial effacement of the right lateral ventricle. No midline shift.    PROCEDURES:   Mechanical thrombectomy for R M1 occlusion, TICI 2b performed by Dr. Madsen     PMH:  Past Medical History:   Diagnosis Date   • A-fib (HCC)    • Apnea, sleep    • Arthritis     osteo, bilateral  knees, back   • Atrial fibrillation (HCC)    • Back pain    • Back pain    • Chickenpox    • Chronic anticoagulation    • Dental disorder     Full dentures   • DVT (deep venous thrombosis) (HCC)    • Eczema    • Falls    • Frequent urination    • Citizen of [place] measles    • Hearing difficulty    • Hypertension    • Mumps    • DERREK (obstructive sleep apnea)    • PAH (pulmonary artery hypertension) (HCC)    • Painful joint     Bilateral knees   • PE (pulmonary thromboembolism) (HCC)    • Pulmonary embolism (HCC)    • Rash    • Shortness of breath    • Sleep apnea    • Snoring    • Swelling of lower extremity    • Venous insufficiency        PSH:  Past Surgical History:   Procedure Laterality Date   • ND THROMBOENDARTECTMY NECK,NECK INCIS Right 7/11/2022    Procedure: RIGHT NECK EXPLORATION; REMOVAL OF RIGHT CAROTID SHEATH;  Surgeon: Sebastian Robertson M.D.;  Location: SURGERY [facility];  Service: Vascular   • ND LAP,INGUINAL HERNIA REPR,INITIAL Right 3/23/2022    Procedure: REPAIR, HERNIA, INGUINAL, ROBOT-ASSISTED, USING [employer] - LARGE;  Surgeon: Itzel Bennett M.D.;  Location: SURGERY [facility];  Service: Gen Robotic   • ARTHROPLASTY Bilateral     knees   • ARTHROSCOPY, KNEE     • CATARACT EXTRACTION WITH IOL Bilateral    • INSERTION PERMANENT SPINAL CORD STIMULATOR     • LAMINOTOMY      lumbar   • TONSILLECTOMY     • TURP-VAPOR     • UROLOGY SURGERY         FHX:  Family History   Problem Relation Age of Onset   • Cancer Mother    • Lung Disease Father        Medications:  Current Facility-Administered Medications   Medication Dose   • calcium carbonate (Tums) chewable tab 1,000 mg  1,000 mg   • haloperidol lactate (HALDOL) injection 1 mg  1 mg   • oxyCODONE immediate-release (ROXICODONE) tablet 5 mg  5 mg    Or   • oxyCODONE immediate release (ROXICODONE) tablet 10 mg  10 mg    Or   • HYDROmorphone (Dilaudid) injection 0.5 mg  0.5 mg   • cyclobenzaprine (Flexeril) tablet 5 mg  5 mg   • dextrose 5 % and 0.9 % NaCl  "with KCl 40 mEq infusion     • gabapentin (NEURONTIN) capsule 100 mg  100 mg   • lidocaine (LIDODERM) 5 % 1 Patch  1 Patch   • acetaminophen (TYLENOL) suppository 650 mg  650 mg   • Pharmacy Consult Request ...Pain Management Review 1 Each  1 Each   • Pharmacy Consult: Enteral tube insertion - review meds/change route/product selection  1 Each   • acetaminophen (Tylenol) tablet 650 mg  650 mg    Followed by   • [START ON 7/19/2022] acetaminophen (Tylenol) tablet 650 mg  650 mg   • atorvastatin (LIPITOR) tablet 80 mg  80 mg   • digoxin (LANOXIN) tablet 250 mcg  250 mcg   • DILTIAZem (CARDIZEM) tablet 120 mg  120 mg   • furosemide (LASIX) tablet 40 mg  40 mg   • senna-docusate (PERICOLACE or SENOKOT S) 8.6-50 MG per tablet 2 Tablet  2 Tablet    And   • polyethylene glycol/lytes (MIRALAX) PACKET 1 Packet  1 Packet    And   • magnesium hydroxide (MILK OF MAGNESIA) suspension 30 mL  30 mL    And   • bisacodyl (DULCOLAX) suppository 10 mg  10 mg   • potassium chloride SA (Kdur) tablet 20 mEq  20 mEq   • ipratropium-albuterol (DUONEB) nebulizer solution  3 mL   • insulin regular (HumuLIN R,NovoLIN R) injection  1-6 Units    And   • dextrose 50% (D50W) injection 25 g  25 g   • Metoprolol Tartrate (LOPRESSOR) injection 5 mg  5 mg   • hydrALAZINE (APRESOLINE) injection 10 mg  10 mg       Allergies:  Allergies   Allergen Reactions   • Vicodin [Apap-Fd&C Yellow #10 Al Aragon-Hydrocodone] Rash     Rash       Physical Exam:  Vitals: BP (!) 141/85   Pulse 74   Temp 36.6 °C (97.9 °F) (Temporal)   Resp 18   Ht 1.93 m (6' 3.98\")   Wt 110 kg (242 lb 8.1 oz)   SpO2 97%   Gen: NAD,sleeping comfortably, daughter at bedside   Head: NC/AT   Eyes/ Nose/ Mouth:moist mucous membranes,   Cardio: RRR, good distal perfusion, warm extremities  Pulm: normal respiratory effort, no cyanosis on 2 L via oxymask   Abd: Soft NTND, negative borborygmi   Ext: No peripheral edema. No calf tenderness. No clubbing.    Mental status:  Patient sleeping "     Sensory:   intact to light touch through out RUE, decreased sensation to LLE     Negative Shaikh b/l     Tone: no spasticity noted, no cogwheeling noted        Labs: Reviewed and significant for   Recent Labs     07/14/22  0138 07/15/22  0248   RBC 4.71 4.54*   HEMOGLOBIN 14.7 14.6   HEMATOCRIT 45.5 44.3   PLATELETCT 161* 147*     Recent Labs     07/14/22  0138 07/15/22  0248 07/16/22  0546   SODIUM 143 142 137   POTASSIUM 4.0 4.1 3.9   CHLORIDE 108 109 105   CO2 26 25 24   GLUCOSE 104* 100* 109*   BUN 22 19 18   CREATININE 0.81 0.71 0.50   CALCIUM 7.8* 7.9* 7.8*     Recent Results (from the past 24 hour(s))   POCT glucose device results    Collection Time: 07/16/22  1:40 AM   Result Value Ref Range    POC Glucose, Blood 93 65 - 99 mg/dL   POCT glucose device results    Collection Time: 07/16/22  4:09 AM   Result Value Ref Range    POC Glucose, Blood 90 65 - 99 mg/dL   Basic Metabolic Panel    Collection Time: 07/16/22  5:46 AM   Result Value Ref Range    Sodium 137 135 - 145 mmol/L    Potassium 3.9 3.6 - 5.5 mmol/L    Chloride 105 96 - 112 mmol/L    Co2 24 20 - 33 mmol/L    Glucose 109 (H) 65 - 99 mg/dL    Bun 18 8 - 22 mg/dL    Creatinine 0.50 0.50 - 1.40 mg/dL    Calcium 7.8 (L) 8.5 - 10.5 mg/dL    Anion Gap 8.0 7.0 - 16.0   ESTIMATED GFR    Collection Time: 07/16/22  5:46 AM   Result Value Ref Range    GFR (CKD-EPI) 101 >60 mL/min/1.73 m 2   POCT glucose device results    Collection Time: 07/16/22  1:12 PM   Result Value Ref Range    POC Glucose, Blood 98 65 - 99 mg/dL         ASSESSMENT:  Patient is a 82 y.o. male admitted with left sided weakness due to R M1 occlusion     [facility] Code / Diagnosis to Support: 0001.1 - Stroke: Left Body Involvement (Right Brain)    Rehabilitation: Impaired ADLs and mobility  Patient is a good candidate for inpatient rehab based on needs for PT, OT, and speech therapy, see dispo details below.     Barriers to transfer include: Insurance authorization, TCCs to verify disposition,  medical clearance and bed availability     Additional Recommendations:   R MCA CVA  - greatest deficits are L sided weakness, dysarthria and dysphagia  - s/p mechanical thrombectomy on 7/11 with TICI 2b   - neuro consulted, repeat CT head 7/12  with evidence of R MCA infarct, no evidence of hemorrhage   - 7/14 Repeat CT Head with evidence of evolving R MCA infarct and new evidence of hemorrhagic transformation   - etiology of CVA likely cardiemoblic, has missed 2 days of eliquis   - currently on statin,     Dysphagia  - patient currently NPO, poor alertness during SLP eval on 7/12  - 7/14 SLP eval completed, continues to be NPO with TF with upgrade to snacks with mildly thickened liquids   - patient pulled out Cortrak, does not want Cortrak placed again, palliative consulted to discuss options for moving to hospice     Afib  - on eliquis at home, currently held   - on digoxin and diltiazem, monitor for dig level      HTN  - currently in IV antihypertensive, likely due to NPO status  - will need BP well controlled on oral meds prior to acceptance to IRF     Dispo  -patient is currently functioning below his level of baseline, and does not desire supplmental interventions in the form of tube feeds, thus palliative has been consulted  - anticipate DC to hospice care        Medical Complexity:  R MCA CVA with hemorrhagic transformation   afib   HTN   Dysphagia   Impaired mobility and ADLs       DVT PPX: SCDs       Thank you for allowing us to participate in the care of this patient.     Patient was seen for 34 minutes on unit/floor of which > 50% of time was spent on counseling and coordination of care regarding the above, including prognosis, risk reduction, benefits of treatment, and options for next stage of care.    Nancy Alicea D.O.   Physical Medicine and Rehabilitation     Please note that this dictation was created using voice recognition software. I have made every reasonable attempt to correct obvious  errors, but there may be errors of grammar and possibly content that I did not discover before finalizing the note.

## 2022-07-17 NOTE — PROGRESS NOTES
"Assumed care of patient at 0700 from NOC RNs. Pt very drowsy during morning shift change, unwilling to participate in NIHSS assessment or answer orientation questions. This RN was notified that the patient had pulled his Cortrak and needed new placement. Charge RN notified, pt noncompliant and refused new Cortrak placement. Pt daughter at bedside attempted to talk patient into allowing placement of the feeding tube; pt still refused. Family requested another palliative consult at bedside to possibly discuss moving forward with Comfort Care since patient began refusing everything stating \"I'm done. I don't want this any more. No feeding tube, no medications, nothing. I just want to go home.\" Palliative came bedside to discuss options with daughter and wife. Pt code status unchanged and still DNAR/DNI. Hospice scheduled to meet with family at bedside tomorrow between 10:00-10:30. Dr. Poe at bedside with family during this time as well. MD increased pain medication dosage for comfort measures. Pt placed on IV fluids for continued hydration since no nutrition is being given. Situation to be reassessed tomorrow with MD, palliative RN, Hospice RN, bedside RN, and family.       "

## 2022-07-17 NOTE — DISCHARGE PLANNING
Case Management Discharge Planning    Admission Date: 7/11/2022  GMLOS: 4.1  ALOS: 6    6-Clicks ADL Score: 6  6-Clicks Mobility Score: 6    Anticipated Discharge Dispo: Discharge Disposition: D/T to hospice medical facility (51)    Action(s) Taken: Spoke with patient's daughter, Magdy at bedside.  Pts spouse and son are deaf.  She informed me that they have chosen [facility].  I discussed SNF, out-of-pocket and she stated that would be an option if patient doesn't pass here.  I provided her with SNF choice form and that I would follow up with her tomorrow.  She stated she would review the choices and will tour facilities.    Medically Clear: yes    Next Steps: Follow up with patient's daughter Magdy tomorrow.    Barriers to Discharge: Placement

## 2022-07-17 NOTE — HOSPICE
Hospice Referral: This RN spoke with daughter, Magdy and she would like a hospice discussion at bedside tomorrow, Sunday (7/17) between 10 & 1030AM. Hospice Sunday team made aware.

## 2022-07-17 NOTE — CARE PLAN
The patient is Watcher - Medium risk of patient condition declining or worsening    Shift Goals  Clinical Goals: comfort  Patient Goals: rest  Family Goals: comfort    Progress made toward(s) clinical / shift goals:  Pt refusing all medications and attempts at any care. Pt refusing turns, blood sugar checks, only accepting measures for comfort. Frequent rounding, attempted repositioning, and pain medications as needed.     Patient is not progressing towards the following goals:      Problem: Psychosocial - Patient Condition  Goal: Patient's ability to verbalize feelings about condition will improve  Outcome: Not Progressing  Goal: Patient's ability to re-evaluate and adapt role responsibilities will improve  Outcome: Not Progressing     Problem: Neuro Status  Goal: Neuro status will remain stable or improve  Outcome: Not Progressing     Problem: Dysphagia  Goal: Dysphagia will improve  Outcome: Not Progressing     Problem: Risk for Aspiration  Goal: Patient's risk for aspiration will be absent or decrease  Outcome: Not Progressing

## 2022-07-17 NOTE — CARE PLAN
The patient is Watcher - Medium risk of patient condition declining or worsening    Shift Goals  Clinical Goals: monitor bp  Patient Goals: sleep  Family Goals: sancho    Progress made toward(s) clinical / shift goals:      Problem: Optimal Care of the Stroke Patient  Goal: Optimal emergency care for the stroke patient  Outcome: Progressing  Goal: Optimal acute care for the stroke patient  Outcome: Progressing     Problem: Psychosocial - Patient Condition  Goal: Patient's ability to verbalize feelings about condition will improve  Outcome: Progressing  Goal: Patient's ability to re-evaluate and adapt role responsibilities will improve  Outcome: Progressing       Patient is not progressing towards the following goals:

## 2022-07-18 NOTE — PROGRESS NOTES
"· Assumed care at 0700  · Pt is comfort care  · POC discussed with Magdy (jorge luis), Candice Arita (hospice RN.  · Pain meds changed. Pt son flying in at 1500. After Son visits, family is requesting comfort care pain meds and anxiety meds to be given more regularly for comfort. Diet changed to soft and bite sized, thickened liquids as pt nodded yes when asked \"are you hungry?\" Pt up to chair with max assist. Okay for pt to be off the floor with family and staff supervision.   "

## 2022-07-18 NOTE — CARE PLAN
Problem: Knowledge Deficit - Comfort Care  Goal: Patient and family/care givers will demonstrate understanding of dying process and grieving  Outcome: Progressing  Note: Plan of care discussed with patient, dolly (NEEL), spouse and son. All questions answered. Plan for today patient son flying in at 1500 from [place] to visit patient. Family requested that minimal pain/anxiety medication to be given until patient son has visited. Discussed with Dr. Poe, family, CM and hospice RN. Pt up to recliner chair today. Started a soft/bite sized diet 1:1 supervision.        Problem: Discharge Planning - Comfort Care  Goal: Patient's continuum of care needs are met  Outcome: Progressing  Note: Pt comfort care patient. Pt will be discharging to SNF under hospice care. Spouse has list of SNFs but hasn't made a final decision. CM and MD aware.    The patient is Unstable - High likelihood or risk of patient condition declining or worsening    Shift Goals  Clinical Goals: poc, pain control  Patient Goals: FABIAN  Family Goals: pt go outsdie, pain control, visitation    Progress made toward(s) clinical / shift goals:       Patient is not progressing towards the following goals:

## 2022-07-18 NOTE — THERAPY
Missed Therapy     Patient Name: Alfred Diaz  Age:  82 y.o., Sex:  male  Medical Record #: [id number]  Today's Date: 7/18/2022 07/18/22 1235   Treatment Variance   Reason For Missed Therapy Medical - Other (Please Comment)  (comfort care)   Interdisciplinary Plan of Care Collaboration   Collaboration Comments Pt is on comfort care and hospice. Will d/c SLP services.

## 2022-07-18 NOTE — DISCHARGE PLANNING
Case Management Discharge Planning    Admission Date: 7/11/2022  GMLOS: 4.1  ALOS: 7    6-Clicks ADL Score: 6  6-Clicks Mobility Score: 6        Anticipated Discharge Dispo: Discharge Disposition: D/T to hospice medical facility (51)      Action(s) Taken: Per patient's daughter, Magdy, her mother is touring SNFs today.    Medically Clear: yes    Next Steps: Follow up with patient's daughter tomorrow.    Barriers to Discharge: Family decision making

## 2022-07-18 NOTE — THERAPY
Occupational Therapy Contact Note    Patient Name: Alfred Diaz  Age:  82 y.o., Sex:  male  Medical Record #: [id number]  Today's Date: 7/18/2022 07/18/22 0835   Interdisciplinary Plan of Care Collaboration   Collaboration Comments Pt code status has transitioned to comfort care. Will DC OT POC at this time.

## 2022-07-18 NOTE — CARE PLAN
The patient is Stable - Low risk of patient condition declining or worsening    Shift Goals  Clinical Goals: Comfort care  Patient Goals: sleep  Family Goals: sancho    Progress made toward(s) clinical / shift goals: Comfort care measures in place. Pt's pain assessed throughout shift to ensure comfortability.    Problem: Skin Integrity  Goal: Skin integrity is maintained or improved  Outcome: Progressing   Pt turned and repositioned Q2H or as requested. Barrier cream and mepilexes used to prevent skin breakdown on delicate perineal areas and bony prominences. Linen changes provided as needed to avoid risk of developing pressure ulcers.     Patient is not progressing towards the following goals:

## 2022-07-18 NOTE — HOSPICE
Patient up in chair, max assist. Patient starting to hallucinate per daughter. Patient reaching out in the air when I was in the room. Patient grimacing.   Medication orders being changed from Pill to liquid form.     More family coming in tonight to say good bye.  Daughter refusing Ativan at this time.

## 2022-07-18 NOTE — DISCHARGE PLANNING
Current documentation does not exhibit the ability to participate/tolerate IRF level of TX.  TCC will no longer follow.  Please reach out to myself with any interval changes/questions.

## 2022-07-18 NOTE — THERAPY
Missed Therapy     Patient Name: Alfred Diaz  Age:  82 y.o., Sex:  male  Medical Record #: [id number]  Today's Date: 7/18/2022    Discussed missed therapy with    07/18/22 0826   Treatment Variance   Reason For Missed Therapy Medical - Other (Please Comment)   Interdisciplinary Plan of Care Collaboration   IDT Collaboration with  Hospitalist RN;Nursing   Collaboration Comments PT tx order received, chart reviewed.  spokew esha RN who reported pt is currently on comfort care and is going to be going to hospice. Spoke with Dr. Poe who confirmed and agreed with discontinuing PT POC.  Will DC PT POC.   Session Information   Date / Session Number  7/15-2(2/4,7/18) DC PT POC on 7/18   Leslie Rincon, PT,DPT

## 2022-07-18 NOTE — PROGRESS NOTES
Hospital Medicine Daily Progress Note    Date of Service  7/18/2022    Chief Complaint  Alfred Diaz is a 82 y.o. male admitted 7/11/2022 with stroke    Hospital Course  82-year-old male with a history of atrial fibrillation, PE/DVT, DERREK, and hypertension who presented with left-sided facial droop, slurred speech and left-sided weakness when he awoke in the morning.  Patient was found to have MCA M1 segment thrombosis status post thrombectomy.  Initially in the ICU.  He was then transferred to the floor.  Unable to obtain MRI brain given SCS.  Etiology likely cardioembolic given history of A. fib and off anticoagulation prior to admission.  Repeat CT head showed moderate right MCA infarct with hemorrhagic transformation.  Neurology recommended holding anticoagulation until 7/18/2022.  He was evaluated by speech therapy who recommended n.p.o., core track placed.  Palliative care was consulted. After multiple family meetings and patient refusing most cares and cortrak, patient and family requested to transition to comfort care.  Initiated on 7/17.        Interval Problem Update  - sitting in the chair today, still having a lot of pain  - drinking some, asked for chocolate milk yesterday  - working on dc planning      Consultants/Specialty  Neuro  PM&R  hospice    Code Status  Comfort Care/DNR    Disposition  Likely hospice    Review of Systems  Review of Systems   Unable to perform ROS: Mental acuity   Constitutional: Negative for chills and fever.   HENT: Negative for nosebleeds.         Dry mouth   Eyes: Negative for redness.   Respiratory: Negative for shortness of breath.    Cardiovascular: Negative for chest pain.   Gastrointestinal: Negative for nausea and vomiting.   Genitourinary: Negative for hematuria.   Musculoskeletal: Positive for back pain and myalgias.   Skin: Negative for rash.   Neurological: Positive for sensory change and focal weakness.   Psychiatric/Behavioral: Negative for memory loss.          Physical Exam  Temp:  [37.1 °C (98.8 °F)] 37.1 °C (98.8 °F)  Pulse:  [72-93] 72  Resp:  [17-20] 17  BP: (161-163)/() 163/101  SpO2:  [88 %-95 %] 95 %    Physical Exam  Constitutional:       General: He is not in acute distress.     Appearance: He is ill-appearing. He is not toxic-appearing or diaphoretic.      Comments: Sleeping   HENT:      Head: Normocephalic and atraumatic.      Nose: Nose normal.      Mouth/Throat:      Mouth: Mucous membranes are dry.   Cardiovascular:      Rate and Rhythm: Normal rate and regular rhythm.      Heart sounds: No murmur heard.    No friction rub. No gallop.   Pulmonary:      Effort: Pulmonary effort is normal.      Breath sounds: Normal breath sounds.   Abdominal:      General: Bowel sounds are normal. There is no distension.      Palpations: Abdomen is soft.      Tenderness: There is no abdominal tenderness.   Musculoskeletal:      Right lower leg: No edema.      Left lower leg: No edema.   Skin:     Coloration: Skin is not jaundiced.      Findings: No rash.   Neurological:      Comments: sleeping             Current Facility-Administered Medications:   •  [DISCONTINUED] oxyCODONE immediate-release (ROXICODONE) tablet 5 mg, 5 mg, Enteral Tube, Q4HRS PRN **OR** [DISCONTINUED] oxyCODONE immediate release (ROXICODONE) tablet 10 mg, 10 mg, Enteral Tube, Q4HRS PRN **OR** HYDROmorphone (Dilaudid) injection 0.5 mg, 0.5 mg, Intravenous, Q3HRS PRN, Gricelda Poe M.D.  •  acetaminophen (Tylenol) tablet 650 mg, 650 mg, Oral, Q6HRS **FOLLOWED BY** [START ON 7/19/2022] acetaminophen (Tylenol) tablet 650 mg, 650 mg, Oral, Q6HRS PRN, Gricelda Poe M.D.  •  gabapentin (NEURONTIN) capsule 100 mg, 100 mg, Oral, TID, Gricelda Poe M.D.  •  cyclobenzaprine (Flexeril) tablet 5 mg, 5 mg, Oral, TID PRN, Gricelda S Deepika, M.D.  •  oxyCODONE 20 MG/ML concentrate 5-10 mg, 5-10 mg, Oral, Q3HRS PRN, Gricelda Poe M.D., 5 mg at 07/18/22 1240  •  MD ALERT...adult comfort  care, , Other, PRN, Gricelda Poe M.D.  •  atropine 1 % ophthalmic solution 2 Drop, 2 Drop, Sublingual, Q4HRS PRN, Gricelda Poe M.D.  •  LORazepam (ATIVAN) 2 MG/ML oral conc 1 mg, 1 mg, Sublingual, Q HOUR PRN **OR** LORazepam (ATIVAN) injection 1 mg, 1 mg, Intravenous, Q HOUR PRN, Gricelda Poe M.D.  •  haloperidol lactate (HALDOL) injection 1 mg, 1 mg, Intravenous, Once PRN, Gricelda Poe M.D.  •  lidocaine (LIDODERM) 5 % 1 Patch, 1 Patch, Transdermal, Q24HR, LOLI Kaminski, 1 Patch at 07/17/22 0825  •  acetaminophen (TYLENOL) suppository 650 mg, 650 mg, Rectal, Q6HRS PRN, Gricelda Poe M.D., 650 mg at 07/14/22 1302  •  Pharmacy Consult Request ...Pain Management Review 1 Each, 1 Each, Other, PHARMACY TO DOSE, Gricelda Poe M.D.  •  ipratropium-albuterol (DUONEB) nebulizer solution, 3 mL, Nebulization, Q4H PRN (RT), Candice Aguila M.D.  •  Metoprolol Tartrate (LOPRESSOR) injection 5 mg, 5 mg, Intravenous, Q6HRS PRN, Candice Aguila M.D.  •  hydrALAZINE (APRESOLINE) injection 10 mg, 10 mg, Intravenous, Q2HRS PRN, José Antonio Madsen M.D., 10 mg at 07/16/22 2019      Fluids    Intake/Output Summary (Last 24 hours) at 7/18/2022 1521  Last data filed at 7/18/2022 0900  Gross per 24 hour   Intake 0 ml   Output 2000 ml   Net -2000 ml       Laboratory      Recent Labs     07/16/22  0546   SODIUM 137   POTASSIUM 3.9   CHLORIDE 105   CO2 24   GLUCOSE 109*   BUN 18   CREATININE 0.50   CALCIUM 7.8*                   Imaging  DX-ABDOMEN FOR TUBE PLACEMENT   Final Result         Feeding tube with tip projecting over the expected area of the stomach fundus.      [id number] VIEW   Final Result         Feeding tube with tip projecting over the expected area of the GE junction.      DX-ABDOMEN FOR TUBE PLACEMENT   Final Result      Feeding tube appears to extend below the diaphragm with the tip likely at the level of the gastric cardia. Volume loss in the left lung base likely  causes deviation of the esophagus to the left side.      CT-HEAD W/O   Final Result      1.  Evolving right MCA infarct with areas of hemorrhagic transformation, new from prior exam.   2.  Partial effacement of the right lateral ventricle. No midline shift.      Dr. Roberts discussed these findings with Dr. Navarro at 546 a.m. by telephone on 7/14/2022      CT-HEAD W/O   Final Result      Subacute right MCA territory, temporal lobe, infarction with some new cortical thickening, worsening sulcal effacement/mass effect. No macro hemorrhagic transformation is identified      DX-CHEST-PORTABLE (1 VIEW)   Final Result         Diffuse interstitial prominence could relate to mild fluid overload.      Elevation of the left hemidiaphragm with left basilar atelectasis.      IR-NEURO INTERVENTIONAL CONSULT-IP   Final Result      1.  Occluded right M1 segment.   2.  Right middle cerebral artery thrombectomy through the right carotid access-TICI 2 b flow.      CT-CTA NECK WITH & W/O-POST PROCESSING   Final Result      CT angiogram of the neck within normal limits.      CT-CEREBRAL PERFUSION ANALYSIS   Final Result      1.Cerebral blood flow less than 30% likely representing completed infarct = 148 mL   2.T Max more than 6 seconds likely representing combination of completed infarct and ischemia = 0 mL   3. Mismatched volume likely representing ischemic brain/penumbra= 148 mL   4.Please note that the cerebral perfusion was performed on the limited brain tissue around the basal ganglia region. Infarct/ischemia outside the CT perfusion sections may not be seen on this study.      CT-CTA HEAD WITH & W/O-POST PROCESS   Final Result      RIGHT M1 occlusion      Findings were communicated with and acknowledged by MICA POON via [employer] on 7/11/2022 8:07 AM.      CT-HEAD W/O   Final Result      1. Hyperdense right MCA is suspicious for right MCA occlusion. Attention on CTA.   2. Slightly decreased gray-white differentiation in the right  temporal lobe, which may be due to an acute infarct.   3. Mild global parenchymal atrophy. Chronic small vessel ischemic changes.           Assessment/Plan  * Acute right arterial ischemic stroke, MCA (middle cerebral artery) (HCC)  Assessment & Plan  S/p IR thrombectomy on 7/11.  Residual left sided weakness remains.  Likely thromboembolic from afib.  Unable to perform MRI  Repeat CT head showed hemorrhagic transformation, holding anticoagulation until 7/18  Maintain -160.  Lipitor.  ST recommended NPO, cortrak placed, fell out, patient refusing to have it put back in    7/17: comfort care initiated after family meeting    Chronic pain  Assessment & Plan  Patient takes tramadol at home  Increasing pain regimen today  May be moving towards hospice    Goals of care, counseling/discussion  Assessment & Plan  Had family meeting with palliative care and patient's family today regarding current clinical status, repeat head CT results and speech evaluation/n.p.o.   -Family decided to transition to DNAR/DNI  - however agreed to cortrak  - total time = 17 minutes    Now comfort care    DERREK (obstructive sleep apnea)- (present on admission)  Assessment & Plan  Continue CPAP. Will order for CPAP qhs.  Likely reason patient with hypersomnolence.    Pulmonary embolus (HCC)- (present on admission)  Assessment & Plan  History of, maintained with eliquis, but patient stopped 2 days PTA.   post op vascular retrieval right carotid artery sheath from IR thrombectomy.  Neurology recommending MRI brain prior to restarting AC to ensure no bleed present.  Holding AC for now given hemorrhagic transformation    Atrial fibrillation (HCC)- (present on admission)  Assessment & Plan  Maintained on Eliquis PTA.  Holding AC until 7/18 given repeat CT head results    Now on comfort care

## 2022-07-19 NOTE — PROGRESS NOTES
Hospital Medicine Daily Progress Note    Date of Service  7/19/2022    Chief Complaint  Alfred Diaz is a 82 y.o. male admitted 7/11/2022 with stroke    Hospital Course  82-year-old male with a history of atrial fibrillation, PE/DVT, DERREK, and hypertension who presented with left-sided facial droop, slurred speech and left-sided weakness when he awoke in the morning.  Patient was found to have MCA M1 segment thrombosis status post thrombectomy.  Initially in the ICU.  He was then transferred to the floor.  Unable to obtain MRI brain given SCS.  Etiology likely cardioembolic given history of A. fib and off anticoagulation prior to admission.  Repeat CT head showed moderate right MCA infarct with hemorrhagic transformation.  Neurology recommended holding anticoagulation until 7/18/2022.  He was evaluated by speech therapy who recommended n.p.o., core track placed.  Palliative care was consulted. After multiple family meetings and patient refusing most cares and cortrak, patient and family requested to transition to comfort care.  Initiated on 7/17.        Interval Problem Update  Patient was seen and examined at bedside.  No acute events overnight. Patient is resting comfortably in bed and in no acute distress.     Comfort care  Await placement to care facility    Consultants/Specialty  Neuro  PM&R  hospice    Code Status  Comfort Care/DNR    Disposition  Likely hospice    Review of Systems  Review of Systems   Unable to perform ROS: Mental acuity   HENT:        Dry mouth         Physical Exam  Temp:  [37 °C (98.6 °F)] 37 °C (98.6 °F)  Pulse:  [] 100  Resp:  [17-18] 18  BP: (110-133)/(75-92) 110/75  SpO2:  [80 %-90 %] 80 %    Physical Exam  Vitals and nursing note reviewed.   Constitutional:       General: He is not in acute distress.     Appearance: He is not toxic-appearing.      Comments: Sleeping   HENT:      Head: Normocephalic and atraumatic.      Right Ear: External ear normal.      Left Ear:  External ear normal.      Nose: Nose normal. No congestion.      Mouth/Throat:      Mouth: Mucous membranes are dry.      Pharynx: No oropharyngeal exudate.   Eyes:      General: No scleral icterus.  Cardiovascular:      Rate and Rhythm: Normal rate and regular rhythm.      Heart sounds: No murmur heard.  Pulmonary:      Effort: Pulmonary effort is normal.      Breath sounds: Normal breath sounds. No wheezing.   Abdominal:      General: Bowel sounds are normal.      Palpations: Abdomen is soft.      Tenderness: There is no abdominal tenderness. There is no guarding or rebound.   Musculoskeletal:         General: No swelling or deformity.   Skin:     Coloration: Skin is not jaundiced.      Findings: No bruising.   Psychiatric:      Comments: Unable to assess             Current Facility-Administered Medications:   •  [DISCONTINUED] oxyCODONE immediate-release (ROXICODONE) tablet 5 mg, 5 mg, Enteral Tube, Q4HRS PRN **OR** [DISCONTINUED] oxyCODONE immediate release (ROXICODONE) tablet 10 mg, 10 mg, Enteral Tube, Q4HRS PRN **OR** HYDROmorphone (Dilaudid) injection 0.5 mg, 0.5 mg, Intravenous, Q3HRS PRN, Gricelda Poe M.D.  •  acetaminophen (Tylenol) tablet 650 mg, 650 mg, Oral, Q6HRS, 650 mg at 07/19/22 0600 **FOLLOWED BY** acetaminophen (Tylenol) tablet 650 mg, 650 mg, Oral, Q6HRS PRN, Gricelda Poe M.D.  •  gabapentin (NEURONTIN) capsule 100 mg, 100 mg, Oral, TID, Gricelda Poe M.D., 100 mg at 07/19/22 0600  •  cyclobenzaprine (Flexeril) tablet 5 mg, 5 mg, Oral, TID PRN, Gricelda Poe M.D.  •  oxyCODONE 20 MG/ML concentrate 5-10 mg, 5-10 mg, Oral, Q3HRS PRN, Gricelda Poe M.D., 10 mg at 07/19/22 0815  •  MD RUIZ...adult comfort care, , Other, PRN, Gricelda Poe M.D.  •  atropine 1 % ophthalmic solution 2 Drop, 2 Drop, Sublingual, Q4HRS PRN, Gricelda Poe M.D., 2 Drop at 07/19/22 1117  •  LORazepam (ATIVAN) 2 MG/ML oral conc 1 mg, 1 mg, Sublingual, Q HOUR PRN **OR** LORazepam  (ATIVAN) injection 1 mg, 1 mg, Intravenous, Q HOUR PRN, Gricelda Poe M.D.  •  haloperidol lactate (HALDOL) injection 1 mg, 1 mg, Intravenous, Once PRN, Gricelda Poe M.D.  •  lidocaine (LIDODERM) 5 % 1 Patch, 1 Patch, Transdermal, Q24HR, LOLI Kaminski, 1 Patch at 07/17/22 0825  •  acetaminophen (TYLENOL) suppository 650 mg, 650 mg, Rectal, Q6HRS PRN, Gricelda Poe M.D., 650 mg at 07/14/22 1302  •  Pharmacy Consult Request ...Pain Management Review 1 Each, 1 Each, Other, PHARMACY TO DOSE, Gricelda Poe M.D.  •  ipratropium-albuterol (DUONEB) nebulizer solution, 3 mL, Nebulization, Q4H PRN (RT), Candice Aguila M.D.  •  Metoprolol Tartrate (LOPRESSOR) injection 5 mg, 5 mg, Intravenous, Q6HRS PRN, Candice Aguila M.D.  •  hydrALAZINE (APRESOLINE) injection 10 mg, 10 mg, Intravenous, Q2HRS PRN, José Antonio Madsen M.D., 10 mg at 07/16/22 2019      Fluids    Intake/Output Summary (Last 24 hours) at 7/19/2022 1350  Last data filed at 7/19/2022 1200  Gross per 24 hour   Intake 0 ml   Output 700 ml   Net -700 ml       Laboratory                        Imaging  DX-ABDOMEN FOR TUBE PLACEMENT   Final Result         Feeding tube with tip projecting over the expected area of the stomach fundus.      [id number] VIEW   Final Result         Feeding tube with tip projecting over the expected area of the GE junction.      DX-ABDOMEN FOR TUBE PLACEMENT   Final Result      Feeding tube appears to extend below the diaphragm with the tip likely at the level of the gastric cardia. Volume loss in the left lung base likely causes deviation of the esophagus to the left side.      CT-HEAD W/O   Final Result      1.  Evolving right MCA infarct with areas of hemorrhagic transformation, new from prior exam.   2.  Partial effacement of the right lateral ventricle. No midline shift.      Dr. Roberts discussed these findings with Dr. Navarro at 546 a.m. by telephone on 7/14/2022      CT-HEAD W/O   Final Result       Subacute right MCA territory, temporal lobe, infarction with some new cortical thickening, worsening sulcal effacement/mass effect. No macro hemorrhagic transformation is identified      DX-CHEST-PORTABLE (1 VIEW)   Final Result         Diffuse interstitial prominence could relate to mild fluid overload.      Elevation of the left hemidiaphragm with left basilar atelectasis.      IR-NEURO INTERVENTIONAL CONSULT-IP   Final Result      1.  Occluded right M1 segment.   2.  Right middle cerebral artery thrombectomy through the right carotid access-TICI 2 b flow.      CT-CTA NECK WITH & W/O-POST PROCESSING   Final Result      CT angiogram of the neck within normal limits.      CT-CEREBRAL PERFUSION ANALYSIS   Final Result      1.Cerebral blood flow less than 30% likely representing completed infarct = 148 mL   2.T Max more than 6 seconds likely representing combination of completed infarct and ischemia = 0 mL   3. Mismatched volume likely representing ischemic brain/penumbra= 148 mL   4.Please note that the cerebral perfusion was performed on the limited brain tissue around the basal ganglia region. Infarct/ischemia outside the CT perfusion sections may not be seen on this study.      CT-CTA HEAD WITH & W/O-POST PROCESS   Final Result      RIGHT M1 occlusion      Findings were communicated with and acknowledged by MICA POON via [employer] on 7/11/2022 8:07 AM.      CT-HEAD W/O   Final Result      1. Hyperdense right MCA is suspicious for right MCA occlusion. Attention on CTA.   2. Slightly decreased gray-white differentiation in the right temporal lobe, which may be due to an acute infarct.   3. Mild global parenchymal atrophy. Chronic small vessel ischemic changes.           Assessment/Plan  * Acute right arterial ischemic stroke, MCA (middle cerebral artery) ([facility])  Assessment & Plan  S/p IR thrombectomy on 7/11.  Residual left sided weakness remains.  Likely thromboembolic from afib.  Unable to perform MRI  Repeat CT  head showed hemorrhagic transformation, holding anticoagulation until 7/18  Maintain -160.  Lipitor.  ST recommended NPO, cortrak placed, fell out, patient refusing to have it put back in    7/17: comfort care initiated after family meeting    Chronic pain  Assessment & Plan  Patient takes tramadol at home  Increasing pain regimen today  May be moving towards hospice    Goals of care, counseling/discussion  Assessment & Plan  Had family meeting with palliative care and patient's family today regarding current clinical status, repeat head CT results and speech evaluation/n.p.o.   -Family decided to transition to DNAR/DNI  - however agreed to cortrak  - total time = 17 minutes    Now comfort care    DERREK (obstructive sleep apnea)- (present on admission)  Assessment & Plan  Continue CPAP. Will order for CPAP qhs.  Likely reason patient with hypersomnolence.    Pulmonary embolus (HCC)- (present on admission)  Assessment & Plan  History of, maintained with eliquis, but patient stopped 2 days PTA.   post op vascular retrieval right carotid artery sheath from IR thrombectomy.  Neurology recommending MRI brain prior to restarting AC to ensure no bleed present.  Holding AC for now given hemorrhagic transformation    Atrial fibrillation (HCC)- (present on admission)  Assessment & Plan  Maintained on Eliquis PTA.  Holding AC until 7/18 given repeat CT head results    Now on comfort care

## 2022-07-19 NOTE — HOSPICE
Patient with a lot of secretions. Not rousable today.   RR 22    Spoke to family educated on medication.     Chani CASTRO to give Ativan and Oxy now

## 2022-07-19 NOTE — CARE PLAN
The patient is Unstable - High likelihood or risk of patient condition declining or worsening    Shift Goals  Clinical Goals: comfort  Patient Goals: sancho  Family Goals: sancho    Progress made toward(s) clinical / shift goals:      Pt stated he was comfortable throughout night, did not want to be turned or repositioned. This AM, pt indicated he was thirsty and hungry; given thickened water and applesauce and pt tolerated well. Pt unable to chew leftover dinner pieces, so writer modified diet to pureed. Pt also indicated he was uncomfortable this AM, did not want to be turned but requested PO oxycodone,

## 2022-07-19 NOTE — PROGRESS NOTES
· Assumed care at 0700  Received report from Marla at bedside   Patient nods/shakes head appropriately. Pt speaks very softly and not often. .   Pt comfort care.   Diet puree with thickened per family preference. Pt with poor PO intake  Oral Oxy and IV Dilaudid for pain.   Pt max assist to chair. Discussed cardiac chair with patient and Jailyn (NEEL). Pt refused to get up to chair and requested to rest. Pt refusing q2hr turns.   Bed in lowest position, locked and upper side rails up. Call bell/belongings within reach. Patient educated on hourly rounding.   Plan        -- pain control       -- comfort    1500  · Conference in room with spouse, hospice RN and writer RN. Hospice recommending oxy and ativan at the same time. Pt respirations more rapid and rattle. Atropine sublingual given. Suction as needed.

## 2022-07-20 NOTE — CARE PLAN
Problem: Knowledge Deficit - Comfort Care  Goal: Patient and family/care givers will demonstrate understanding of dying process and grieving  Outcome: Progressing  Note: Family at bedside. Offered family bereavement tray but family declined. Family supportive of patient's choice to be comfort care and requesting that pt just be comfortable. Family very appreciative of nursing staff and care.      Problem: Psychosocial - Comfort Care  Goal: Privacy will be maintained for patient and family  Outcome: Met   The patient is Unstable - High likelihood or risk of patient condition declining or worsening    Shift Goals  Clinical Goals: pain control; comfort  Patient Goals: pian control  Family Goals: comfort    Progress made toward(s) clinical / shift goals:       Patient is not progressing towards the following goals:

## 2022-07-20 NOTE — PROGRESS NOTES
Pt is currently a Comfort Care, family is at bedside, pt suctioned, pt family requests that pt not to be moved, ensured of pt comfort.  At this time pt's family requested no medications to be given, as family is around pt speaking to him.  Supportive of family's decision during this transition.  Informed family that I am available to them for any needs.

## 2022-07-20 NOTE — DISCHARGE SUMMARY
Death Summary    Cause of Death  Cardiac arrest due to respiratory failure due to ischemic stroke due to thromboemolism.    Comorbid Conditions at the Time of Death  Principal Problem:    Acute right arterial ischemic stroke, MCA (middle cerebral artery) (HCC) POA: Unknown  Active Problems:    Atrial fibrillation (HCC) POA: Yes    Pulmonary embolus (HCC) POA: Yes    DERREK (obstructive sleep apnea) POA: Yes    Goals of care, counseling/discussion POA: Unknown    Chronic pain POA: Unknown  Resolved Problems:    * No resolved hospital problems. *      History of Presenting Illness and Hospital Course  82-year-old male with a history of atrial fibrillation, PE/DVT, DERREK, and hypertension who presented with left-sided facial droop, slurred speech and left-sided weakness when he awoke in the morning.  Patient was found to have MCA M1 segment thrombosis status post thrombectomy.  Initially in the ICU.  He was then transferred to the floor.  Unable to obtain MRI brain given SCS.  Etiology likely cardioembolic given history of A. fib and off anticoagulation prior to admission.  Repeat CT head showed moderate right MCA infarct with hemorrhagic transformation.  Neurology recommended holding anticoagulation until 2022.  He was evaluated by speech therapy who recommended n.p.o., core track placed.  Palliative care was consulted. After multiple family meetings and patient refusing most cares and cortrak, patient and family requested to transition to comfort care.  Patient  under comfort care on 2022.     Death Date: 22   Death Time: 1003         Pronounced By (RN1): Jelena Carmen  Pronounced By (RN2): Zluema Sandoval

## (undated) DEVICE — DRAPE STRLE REG TOWEL 18X24 - (10/BX 4BX/CA)"

## (undated) DEVICE — MASK ANESTHESIA ADULT  - (100/CA)

## (undated) DEVICE — SUCTION INSTRUMENT YANKAUER BULBOUS TIP W/O VENT (50EA/CA)

## (undated) DEVICE — POLY UMBILICAL TAPE 1/8X30 - (36/BX)

## (undated) DEVICE — SEAL 5MM-8MM UNIVERSAL  BOX OF 10

## (undated) DEVICE — VESSELOOP MAXI BLUE STERILE- SURG-I-LOOP (10EA/BX)

## (undated) DEVICE — DRESSING TRANSPARENT FILM TEGADERM 4 X 4.75" (50EA/BX)"

## (undated) DEVICE — GOWN SURGEONS X-LARGE - DISP. (30/CA)

## (undated) DEVICE — STAPLER SKIN DISP - (6/BX 10BX/CA) VISISTAT

## (undated) DEVICE — SUTURE 2-0 VICRYL PLUS CT-1 36 (36PK/BX)"

## (undated) DEVICE — SODIUM CHL IRRIGATION 0.9% 1000ML (12EA/CA)

## (undated) DEVICE — LACTATED RINGERS INJ 1000 ML - (14EA/CA 60CA/PF)

## (undated) DEVICE — GLOVE BIOGEL PI INDICATOR SZ 7.0 SURGICAL PF LF - (50/BX 4BX/CA)

## (undated) DEVICE — ELECTRODE 850 FOAM ADHESIVE - HYDROGEL RADIOTRNSPRNT (50/PK)

## (undated) DEVICE — KIT ANESTHESIA W/CIRCUIT & 3/LT BAG W/FILTER (20EA/CA)

## (undated) DEVICE — GOWN WARMING STANDARD FLEX - (30/CA)

## (undated) DEVICE — TOWELS CLOTH SURGICAL - (4/PK 20PK/CA)

## (undated) DEVICE — TUBE CONNECT SUCTION CLEAR 120 X 1/4" (50EA/CA)"

## (undated) DEVICE — SUTURE 4-0 30CM STRATAFIX SPIRAL PS-2 (12EA/BX)

## (undated) DEVICE — SUTURE2-0 20CM STRATAFIX SPIRAL PDS CT-1 (12EA/BX)

## (undated) DEVICE — KIT SURGIFLO W/OUT THROMBIN - (6EA/CA)

## (undated) DEVICE — TOWEL STOP TIMEOUT SAFETY FLAG (40EA/CA)

## (undated) DEVICE — CANISTER SUCTION 3000ML MECHANICAL FILTER AUTO SHUTOFF MEDI-VAC NONSTERILE LF DISP  (40EA/CA)

## (undated) DEVICE — DRAPE ARM  BOX OF 20

## (undated) DEVICE — VESSELOOP MINI BLUE STERILE - SURG-I-LOOP (10EA/BX)

## (undated) DEVICE — SUTURE 6-0 PROLENE BV-1 D/A 24 (36PK/BX)"

## (undated) DEVICE — BIPOLAR FORCE DA VINCI 12X'S REISABLE

## (undated) DEVICE — PACK MINOR BASIN - (2EA/CA)

## (undated) DEVICE — PROTECTOR ULNA NERVE - (36PR/CA)

## (undated) DEVICE — SET LEADWIRE 5 LEAD BEDSIDE DISPOSABLE ECG (1SET OF 5/EA)

## (undated) DEVICE — WATER IRRIGATION STERILE 1000ML (12EA/CA)

## (undated) DEVICE — GLOVE SZ 7 BIOGEL PI MICRO - PF LF (50PR/BX 4BX/CA)

## (undated) DEVICE — ROBOTIC SURGERY SERVICES

## (undated) DEVICE — TUBING CLEARLINK DUO-VENT - C-FLO (48EA/CA)

## (undated) DEVICE — BLADE SURGICAL #11 - (50/BX)

## (undated) DEVICE — HEAD HOLDER JUNIOR/ADULT

## (undated) DEVICE — SEAL CANNULA STAPLER 12 MM (10EA/BX)

## (undated) DEVICE — PAD LAP STERILE 18 X 18 - (5/PK 40PK/CA)

## (undated) DEVICE — DRAIN J-VAC 7MM FLAT - (10EA/CA)

## (undated) DEVICE — SET EXTENSION WITH 2 PORTS (48EA/CA) ***PART #2C8610 IS A SUBSTITUTE*****

## (undated) DEVICE — GLOVE SZ 6.5 BIOGEL PI MICRO - PF LF (50PR/BX)

## (undated) DEVICE — SPONGE GAUZESTER 4 X 4 4PLY - (128PK/CA)

## (undated) DEVICE — CLIP MED INTNL HRZN TI ESCP - (25/BX)

## (undated) DEVICE — CHLORAPREP 26 ML APPLICATOR - ORANGE TINT(25/CA)

## (undated) DEVICE — SUTURE GENERAL

## (undated) DEVICE — ELECTRODE DUAL RETURN W/ CORD - (50/PK)

## (undated) DEVICE — SHEARS MONOPOLAR CURVED  DA VINCI 10X'S REUSABLE

## (undated) DEVICE — TUBING LAPAROSCOPIC PLUME DEVICE (10EA/CA)

## (undated) DEVICE — Device

## (undated) DEVICE — SYRINGE 30 ML LL (56/BX)

## (undated) DEVICE — SUTURE 6-0 PROLENE C-1 D/A 24 (36PK/BX)"

## (undated) DEVICE — SLEEVE, VASO, THIGH, MED

## (undated) DEVICE — NEEDLE DRIVER MEGA SUTURECUT DA VINCI 15X'S REUSABLE

## (undated) DEVICE — SHEET THYROID - (10EA/CA)

## (undated) DEVICE — SODIUM CHL. INJ. 0.9% 500ML (24EA/CA 50CA/PF)

## (undated) DEVICE — RESERVOIR SUCTION 100 CC - SILICONE (20EA/CA)

## (undated) DEVICE — SENSOR SPO2 NEO LNCS ADHESIVE (20/BX) SEE USER NOTES

## (undated) DEVICE — DRAPE MAGNETIC (INSTRA-MAG) - (30/CA)

## (undated) DEVICE — SENSOR OXIMETER ADULT SPO2 RD SET (20EA/BX)

## (undated) DEVICE — REDUCER XI STAPLER 12MM TO 8MM (6EA/BX)

## (undated) DEVICE — COVER TIP ENDOWRIST HOT SHEAR - (10EA/BX) DA VINCI

## (undated) DEVICE — DRAPE IOBAN II INCISE 23X17 - (10EA/BX 4BX/CA)

## (undated) DEVICE — SUTURE 3-0 SILK 12 X 18 IN - (36/BX)

## (undated) DEVICE — DECANTER FLD BLS - (50/CA)

## (undated) DEVICE — OBTURATOR BLADELESS STANDARD 8MM (6EA/BX)

## (undated) DEVICE — NEEDLE INSFL 120MM 14GA VRRS - (20/BX)

## (undated) DEVICE — SUTURE 4-0 MONOCRYL PLUS PS-2 - 27 INCH (36/BX)

## (undated) DEVICE — SUTURE CV

## (undated) DEVICE — DRAPE COLUMN  BOX OF 20

## (undated) DEVICE — KIT RADIAL ARTERY 20GA W/MAX BARRIER AND BIOPATCH  (5EA/CA) #10740 IS FOR THE SET RADIAL ARTERIAL

## (undated) DEVICE — SUTURE 2-0 ETHILON FS - (36/BX) 18 INCH

## (undated) DEVICE — SUTURE 5-0 PROLENE BLUE C-1 HS 1 X 30 (36EA/BX)"

## (undated) DEVICE — WIPE INSTRUMENT MICROWIPE (20EA/SP)

## (undated) DEVICE — SLEEVE VASO CALF MED - (10PR/CA)

## (undated) DEVICE — GLOVE BIOGEL SZ 7 SURGICAL PF LTX - (50PR/BX 4BX/CA)

## (undated) DEVICE — CLIP SM INTNL HRZN TI ESCP LGT - (24EA/PK 25PK/BX)

## (undated) DEVICE — SOD. CHL. INJ. 0.9% 1000 ML - (14EA/CA 60CA/PF)